# Patient Record
Sex: FEMALE | Race: BLACK OR AFRICAN AMERICAN | NOT HISPANIC OR LATINO | Employment: UNEMPLOYED | URBAN - METROPOLITAN AREA
[De-identification: names, ages, dates, MRNs, and addresses within clinical notes are randomized per-mention and may not be internally consistent; named-entity substitution may affect disease eponyms.]

---

## 2018-03-28 ENCOUNTER — OFFICE VISIT (OUTPATIENT)
Dept: OPTOMETRY | Facility: CLINIC | Age: 33
End: 2018-03-28
Payer: COMMERCIAL

## 2018-03-28 DIAGNOSIS — H52.4 HYPEROPIA WITH PRESBYOPIA OF BOTH EYES: Primary | ICD-10-CM

## 2018-03-28 DIAGNOSIS — H52.03 HYPEROPIA WITH PRESBYOPIA OF BOTH EYES: Primary | ICD-10-CM

## 2018-03-28 PROCEDURE — 92014 COMPRE OPH EXAM EST PT 1/>: CPT | Performed by: OPTOMETRIST

## 2018-03-28 PROCEDURE — 92015 DETERMINE REFRACTIVE STATE: CPT | Performed by: OPTOMETRIST

## 2018-03-28 ASSESSMENT — EXTERNAL EXAM - LEFT EYE: OS_EXAM: NORMAL

## 2018-03-28 ASSESSMENT — SLIT LAMP EXAM - LIDS
COMMENTS: NORMAL
COMMENTS: NORMAL

## 2018-03-28 ASSESSMENT — REFRACTION_MANIFEST
OS_CYLINDER: +0.50
OS_CYLINDER: +0.50
OS_AXIS: 110
METHOD_AUTOREFRACTION: 1
OD_AXIS: 044
OD_SPHERE: +0.25
OD_ADD: +1.00
OS_ADD: +1.00
OS_AXIS: 103
OD_SPHERE: +0.25
OS_SPHERE: +0.50
OD_CYLINDER: +0.25
OS_SPHERE: +0.25

## 2018-03-28 ASSESSMENT — EXTERNAL EXAM - RIGHT EYE: OD_EXAM: NORMAL

## 2018-03-28 ASSESSMENT — REFRACTION_WEARINGRX
OS_SPHERE: +0.75
OD_SPHERE: +0.50
OS_CYLINDER: +0.50
OS_AXIS: 110

## 2018-03-28 ASSESSMENT — CONF VISUAL FIELD
OS_NORMAL: 1
OD_NORMAL: 1

## 2018-03-28 ASSESSMENT — TONOMETRY
IOP_METHOD: APPLANATION
OS_IOP_MMHG: 20
OD_IOP_MMHG: 19

## 2018-03-28 ASSESSMENT — VISUAL ACUITY
OS_SC+: -2
OS_SC: 20/20
OD_SC: 20/30
OD_SC+: -1
OS_SC: 20/30
METHOD: SNELLEN - LINEAR
OD_SC: 20/20

## 2018-03-28 ASSESSMENT — CUP TO DISC RATIO
OS_RATIO: 0.2
OD_RATIO: 0.2

## 2018-03-28 NOTE — PROGRESS NOTES
Chief Complaint   Patient presents with     COMPREHENSIVE EYE EXAM         Last Eye Exam: 11/2016  Dilated Previously: Yes      Pt has a headache every day.  Pt come on as the day goes on - especially if reading.      What are you currently using to see?  Glasses - lost 3 months ago       Distance Vision Acuity: Noticed gradual change in both eyes    Near Vision Acuity: Satisfied with vision while reading  unaided    Eye Comfort: watery eyes - right eye will feel irritation and uncomfortable.    Do you use eye drops? : No  Occupation or Hobbies: works at [employer] and [employer]            Medical, surgical and family histories reviewed and updated 3/28/2018.       OBJECTIVE: See Ophthalmology exam    ASSESSMENT:    ICD-10-CM    1. Hyperopia with presbyopia of both eyes H52.03 EYE EXAM (SIMPLE-NONBILLABLE)    H52.4 REFRACTION      PLAN:     Patient Instructions   A bifocal will help you to see more clearly at both distance and near.    Your eyes may be blurry at near and sensitive to light for several hours from the dilating drops.    Yearly eye exams recommended.    Thank you!

## 2018-03-28 NOTE — MR AVS SNAPSHOT
After Visit Summary   3/28/2018    Armida Quiñonez    MRN: [id number]           Patient Information     Date Of Birth          1985        Visit Information        Provider Department      3/28/2018 2:40 PM Aliya Reed OD [facility]        Today's Diagnoses     Hyperopia with presbyopia of both eyes    -  1      Care Instructions    A bifocal will help you to see more clearly at both distance and near.    Your eyes may be blurry at near and sensitive to light for several hours from the dilating drops.    Yearly eye exams recommended.    Thank you!          Follow-ups after your visit        Follow-up notes from your care team     Return in about 1 year (around 3/28/2019) for comprehensive eye exam.      Your next 10 appointments already scheduled     Apr 04, 2018  8:20 AM CDT   PHYSICAL with Catherine Seo MD   [facility] ([facility])    [address] [place] [id number]   [phone]              Who to contact     If you have questions or need follow up information about today's clinic visit or your schedule please contact [facility] directly at [phone].  Normal or non-critical lab and imaging results will be communicated to you by MyChart, letter or phone within 4 business days after the clinic has received the results. If you do not hear from us within 7 days, please contact the clinic through MyChart or phone. If you have a critical or abnormal lab result, we will notify you by phone as soon as possible.  Submit refill requests through Bazari or call your pharmacy and they will forward the refill request to us. Please allow 3 business days for your refill to be completed.          Additional Information About Your Visit        MyChart Information     Bazari gives you secure access to your electronic health record. If you see a primary care provider, you can also  send messages to your care team and make appointments. If you have questions, please call your primary care clinic.  If you do not have a primary care provider, please call [phone] and they will assist you.        Care EveryWhere ID     This is your Care EveryWhere ID. This could be used by other organizations to access your [facility] medical records  [id number]         Blood Pressure from Last 3 Encounters:   11/07/16 119/62   09/06/16 109/62   07/14/16 129/73    Weight from Last 3 Encounters:   11/07/16 103.2 kg (227 lb 9.6 oz)   09/06/16 102.7 kg (226 lb 8 oz)   07/14/16 101.6 kg (224 lb)              We Performed the Following     EYE EXAM (SIMPLE-NONBILLABLE)     REFRACTION        Primary Care Provider Office Phone # Fax #    Catherine Gómez Lillian Seo [phone]       [address] [place]  [facility] [address]        Equal Access to Services     MINERVA ROBERT : Hadii aad ku hadasho Soomaali, waaxda luqadaha, qaybta kaalmada adeegyada, waxay idiin hayaan adeeg kharash la'ben . So [facility] [phone].    ATENCIÓN: Si habla español, tiene a brown disposición servicios gratuitos de asistencia lingüística. [facility] [phone].    We comply with applicable federal civil rights laws and [place] laws. We do not discriminate on the basis of race, color, national origin, age, disability, sex, sexual orientation, or gender identity.            Thank you!     Thank you for choosing [facility]  for your care. Our goal is always to provide you with excellent care. Hearing back from our patients is one way we can continue to improve our services. Please take a few minutes to complete the written survey that you may receive in the mail after your visit with us. Thank you!             Your Updated Medication List - Protect others around you: Learn how to safely use, store and throw away your medicines at www.disposemymeds.org.          This list is accurate as of 3/28/18 11:59  PM.  Always use your most recent med list.                   Brand Name Dispense Instructions for use Diagnosis    albuterol 108 (90 BASE) MCG/ACT Inhaler    PROAIR HFA/PROVENTIL HFA/VENTOLIN HFA    1 Inhaler    Inhale 2 puffs into the lungs every 6 hours as needed for shortness of breath / dyspnea    Mild persistent asthma, uncomplicated       cyclobenzaprine 10 MG tablet    FLEXERIL    20 tablet    Take 1 tablet (10 mg) by mouth 3 times daily as needed for muscle spasms    Neck pain       flunisolide HFA 80 MCG/ACT Aers oral inhaler    AEROSPAN    27 g    Inhale 1 puff into the lungs 2 times daily    Moderate persistent asthma without complication       ibuprofen 800 MG tablet    ADVIL/MOTRIN    30 tablet    Take 1 tablet (800 mg) by mouth every 8 hours as needed for moderate pain    Acute mechanical low back pain with duration of less than six weeks       montelukast 10 MG tablet    SINGULAIR    90 tablet    Take 1 tablet (10 mg) by mouth At Bedtime    Environmental allergies

## 2018-03-28 NOTE — LETTER
3/28/2018         RE: Armida Quiñonez  [address]   [facility] [address]        Dear Colleague,    Thank you for referring your patient, Armida Quiñonez, to the [facility]. Please see a copy of my visit note below.    Chief Complaint   Patient presents with     COMPREHENSIVE EYE EXAM         Last Eye Exam: 11/2016  Dilated Previously: Yes      Pt has a headache every day.  Pt come on as the day goes on - especially if reading.      What are you currently using to see?  Glasses - lost 3 months ago       Distance Vision Acuity: Noticed gradual change in both eyes    Near Vision Acuity: Satisfied with vision while reading  unaided    Eye Comfort: watery eyes - right eye will feel irritation and uncomfortable.    Do you use eye drops? : No  Occupation or Hobbies: works at [employer]            Medical, surgical and family histories reviewed and updated 3/28/2018.       OBJECTIVE: See Ophthalmology exam    ASSESSMENT:    ICD-10-CM    1. Hyperopia with presbyopia of both eyes H52.03 EYE EXAM (SIMPLE-NONBILLABLE)    H52.4 REFRACTION      PLAN:     Patient Instructions   A bifocal will help you to see more clearly at both distance and near.    Your eyes may be blurry at near and sensitive to light for several hours from the dilating drops.    Yearly eye exams recommended.    Thank you!         Again, thank you for allowing me to participate in the care of your patient.        Sincerely,        Aliya Reed OD

## 2018-03-29 NOTE — PATIENT INSTRUCTIONS
A bifocal will help you to see more clearly at both distance and near.    Your eyes may be blurry at near and sensitive to light for several hours from the dilating drops.    Yearly eye exams recommended.    Thank you!

## 2018-04-04 ENCOUNTER — OFFICE VISIT (OUTPATIENT)
Dept: FAMILY MEDICINE | Facility: CLINIC | Age: 33
End: 2018-04-04
Payer: COMMERCIAL

## 2018-04-04 VITALS
HEIGHT: 65 IN | DIASTOLIC BLOOD PRESSURE: 73 MMHG | WEIGHT: 221 LBS | OXYGEN SATURATION: 97 % | SYSTOLIC BLOOD PRESSURE: 119 MMHG | BODY MASS INDEX: 36.82 KG/M2 | RESPIRATION RATE: 18 BRPM | TEMPERATURE: 98.6 F | HEART RATE: 70 BPM

## 2018-04-04 DIAGNOSIS — F31.81 BIPOLAR 2 DISORDER (H): ICD-10-CM

## 2018-04-04 DIAGNOSIS — R06.81 APNEA: ICD-10-CM

## 2018-04-04 DIAGNOSIS — Z00.00 ENCOUNTER FOR ROUTINE ADULT HEALTH EXAMINATION WITHOUT ABNORMAL FINDINGS: Primary | ICD-10-CM

## 2018-04-04 DIAGNOSIS — E66.01 MORBID OBESITY (H): ICD-10-CM

## 2018-04-04 DIAGNOSIS — Z11.3 SCREEN FOR STD (SEXUALLY TRANSMITTED DISEASE): ICD-10-CM

## 2018-04-04 DIAGNOSIS — J45.30 MILD PERSISTENT ASTHMA, UNCOMPLICATED: ICD-10-CM

## 2018-04-04 LAB
CHOLEST SERPL-MCNC: 208 MG/DL
GLUCOSE SERPL-MCNC: 88 MG/DL (ref 70–99)
HBV SURFACE AG SERPL QL IA: NONREACTIVE
HCV AB SERPL QL IA: NONREACTIVE
HDLC SERPL-MCNC: 66 MG/DL
HIV 1+2 AB+HIV1 P24 AG SERPL QL IA: NONREACTIVE
LDLC SERPL CALC-MCNC: 129 MG/DL
NONHDLC SERPL-MCNC: 142 MG/DL
T PALLIDUM IGG+IGM SER QL: NEGATIVE
TRIGL SERPL-MCNC: 66 MG/DL
TSH SERPL DL<=0.005 MIU/L-ACNC: 0.52 MU/L (ref 0.4–4)

## 2018-04-04 PROCEDURE — 99213 OFFICE O/P EST LOW 20 MIN: CPT | Mod: 25 | Performed by: FAMILY MEDICINE

## 2018-04-04 PROCEDURE — 99395 PREV VISIT EST AGE 18-39: CPT | Performed by: FAMILY MEDICINE

## 2018-04-04 PROCEDURE — 87340 HEPATITIS B SURFACE AG IA: CPT | Performed by: FAMILY MEDICINE

## 2018-04-04 PROCEDURE — 87591 N.GONORRHOEAE DNA AMP PROB: CPT | Performed by: FAMILY MEDICINE

## 2018-04-04 PROCEDURE — 87389 HIV-1 AG W/HIV-1&-2 AB AG IA: CPT | Performed by: FAMILY MEDICINE

## 2018-04-04 PROCEDURE — 86803 HEPATITIS C AB TEST: CPT | Performed by: FAMILY MEDICINE

## 2018-04-04 PROCEDURE — 80061 LIPID PANEL: CPT | Performed by: FAMILY MEDICINE

## 2018-04-04 PROCEDURE — 87491 CHLMYD TRACH DNA AMP PROBE: CPT | Performed by: FAMILY MEDICINE

## 2018-04-04 PROCEDURE — 84443 ASSAY THYROID STIM HORMONE: CPT | Performed by: FAMILY MEDICINE

## 2018-04-04 PROCEDURE — 36415 COLL VENOUS BLD VENIPUNCTURE: CPT | Performed by: FAMILY MEDICINE

## 2018-04-04 PROCEDURE — 82947 ASSAY GLUCOSE BLOOD QUANT: CPT | Performed by: FAMILY MEDICINE

## 2018-04-04 PROCEDURE — 86780 TREPONEMA PALLIDUM: CPT | Performed by: FAMILY MEDICINE

## 2018-04-04 RX ORDER — ALBUTEROL SULFATE 90 UG/1
2 AEROSOL, METERED RESPIRATORY (INHALATION) EVERY 6 HOURS PRN
Qty: 1 INHALER | Refills: 11 | Status: SHIPPED | OUTPATIENT
Start: 2018-04-04 | End: 2023-07-27

## 2018-04-04 RX ORDER — MULTIVIT WITH CALCIUM,IRON,MIN
1 TABLET ORAL DAILY
Qty: 100 TABLET | Refills: 3 | Status: SHIPPED | OUTPATIENT
Start: 2018-04-04 | End: 2019-10-07

## 2018-04-04 ASSESSMENT — ANXIETY QUESTIONNAIRES
6. BECOMING EASILY ANNOYED OR IRRITABLE: NEARLY EVERY DAY
7. FEELING AFRAID AS IF SOMETHING AWFUL MIGHT HAPPEN: NEARLY EVERY DAY
2. NOT BEING ABLE TO STOP OR CONTROL WORRYING: NEARLY EVERY DAY
3. WORRYING TOO MUCH ABOUT DIFFERENT THINGS: NEARLY EVERY DAY
1. FEELING NERVOUS, ANXIOUS, OR ON EDGE: NEARLY EVERY DAY
GAD7 TOTAL SCORE: 20
5. BEING SO RESTLESS THAT IT IS HARD TO SIT STILL: MORE THAN HALF THE DAYS

## 2018-04-04 ASSESSMENT — PATIENT HEALTH QUESTIONNAIRE - PHQ9: 5. POOR APPETITE OR OVEREATING: NEARLY EVERY DAY

## 2018-04-04 ASSESSMENT — PAIN SCALES - GENERAL: PAINLEVEL: NO PAIN (0)

## 2018-04-04 NOTE — PROGRESS NOTES
SUBJECTIVE:   CC: Armida Quiñonez is an 32 year old woman who presents for preventive health visit.     Healthy Habits:    Do you get at least three servings of calcium containing foods daily (dairy, green leafy vegetables, etc.)? yes and no, taking calcium and/or vitamin D supplement: no, not taking    Amount of exercise or daily activities, outside of work: none    Problems taking medications regularly not applicable    Medication side effects: No    Have you had an eye exam in the past two years? yes    Do you see a dentist twice per year? no    Do you have sleep apnea, excessive snoring or daytime drowsiness?yes, snoring and witnessed apneic episodes per patient for years.      Bipolar 2 - stable without medication currently. Has moved back from [place] where she is from originally. Too much family drama.    Obesity - has lost a little weight recently. Not much protein intake and a lot of starches.  Some skin rashes and hair loss noted.    Asthma - doing very well.  Usually has issues in the summer.    Today's PHQ-2 Score:   PHQ-2 ( 1999 Pfizer) 4/4/2018 7/14/2016   Q1: Little interest or pleasure in doing things 0 0   Q2: Feeling down, depressed or hopeless 0 0   PHQ-2 Score 0 0       Abuse: Current or Past(Physical, Sexual or Emotional)- No  Do you feel safe in your environment - Yes    Social History   Substance Use Topics     Smoking status: Former Smoker     Packs/day: 0.50     Years: 3.00     Types: Cigarettes     Quit date: 5/13/2011     Smokeless tobacco: Never Used     Alcohol use 0.0 oz/week     0 Standard drinks or equivalent per week      Comment: occassionally     If you drink alcohol do you typically have >3 drinks per day or >7 drinks per week? No                     Reviewed orders with patient.  Reviewed health maintenance and updated orders accordingly - Yes  Patient Active Problem List   Diagnosis     CARDIOVASCULAR SCREENING; LDL GOAL LESS THAN 160     Morbid obesity (H)     Mild  persistent asthma, uncomplicated     Bipolar 2 disorder (H)     DEMETRI (generalized anxiety disorder)     Past Surgical History:   Procedure Laterality Date     DILATION AND CURETTAGE SUCTION, TREAT INCOMPLETE        HC COLP CERVIX/UPPER VAGINA W BX CERVIX      neg     HC INSERTION INTRAUTERINE DEVICE      Mirena     HC LAPAROSCOPY, SURGICAL; APPENDECTOMY       HC REMOVE INTRAUTERINE DEVICE         Social History   Substance Use Topics     Smoking status: Former Smoker     Packs/day: 0.50     Years: 3.00     Types: Cigarettes     Quit date: 2011     Smokeless tobacco: Never Used     Alcohol use 0.0 oz/week     0 Standard drinks or equivalent per week      Comment: occassionally     Family History   Problem Relation Age of Onset     CANCER Mother      CANCER Maternal Grandmother      Unknown/Adopted No family hx of      DIABETES No family hx of      Hypertension No family hx of      CEREBROVASCULAR DISEASE No family hx of      Thyroid Disease No family hx of      Glaucoma No family hx of      Macular Degeneration No family hx of            Mammogram not appropriate for this patient based on age.    Pertinent mammograms are reviewed under the imaging tab.  History of abnormal Pap smear: NO - age 30- 65 PAP every 3 years recommended    Reviewed and updated as needed this visit by clinical staff  Tobacco  Allergies  Meds  Problems         Reviewed and updated as needed this visit by Provider  Allergies  Meds  Problems            ROS:  C: NEGATIVE for fever, chills, change in weight  I: NEGATIVE for worrisome rashes, moles or lesions  E: NEGATIVE for vision changes or irritation  ENT: NEGATIVE for ear, mouth and throat problems  R: NEGATIVE for significant cough or SOB  B: NEGATIVE for masses, tenderness or discharge  CV: NEGATIVE for chest pain, palpitations or peripheral edema  GI: NEGATIVE for nausea, abdominal pain, heartburn, or change in bowel habits  : NEGATIVE for unusual  "urinary or vaginal symptoms. Periods are regular.  M: NEGATIVE for significant arthralgias or myalgia  N: NEGATIVE for weakness, dizziness or paresthesias  P: NEGATIVE for changes in mood or affect    OBJECTIVE:   /73 (BP Location: Left arm, Patient Position: Chair, Cuff Size: Adult Regular)  Pulse 70  Temp 98.6  F (37  C) (Oral)  Resp 18  Ht 5' 5.35\" (1.66 m)  Wt 221 lb (100.2 kg)  LMP 03/21/2018 (Within Days)  SpO2 97%  Breastfeeding? No  BMI 36.38 kg/m2  EXAM:  GENERAL: healthy, alert, no distress and obese  EYES: Eyes grossly normal to inspection, PERRL and conjunctivae and sclerae normal  HENT: ear canals and TM's normal, nose and mouth without ulcers or lesions  NECK: no adenopathy, no asymmetry, masses, or scars and thyroid normal to palpation  RESP: lungs clear to auscultation - no rales, rhonchi or wheezes  BREAST: normal without masses, tenderness or nipple discharge and no palpable axillary masses or adenopathy  CV: regular rate and rhythm, normal S1 S2, no S3 or S4, no murmur, click or rub, no peripheral edema and peripheral pulses strong  ABDOMEN: soft, nontender, no hepatosplenomegaly, no masses and bowel sounds normal  MS: no gross musculoskeletal defects noted, no edema  SKIN: no suspicious lesions or rashes  NEURO: Normal strength and tone, mentation intact and speech normal  PSYCH: mentation appears normal, affect normal/bright    ASSESSMENT/PLAN:   1. Encounter for routine adult health examination without abnormal findings  Routine preventive discussed  - DEPRESSION ACTION PLAN (DAP)  - Lipid panel reflex to direct LDL Non-fasting  - Glucose  - **TSH with free T4 reflex FUTURE 2mo  - Multiple Vitamins-Minerals (MULTIPLE VITAMIN  S/WOMENS) TABS; Take 1 tablet by mouth daily  Dispense: 100 tablet; Refill: 3    2. Screen for STD (sexually transmitted disease)  screening  - Chlamydia trachomatis PCR  - Neisseria gonorrhoeae PCR  - Hepatitis C antibody  - Hepatitis B surface antigen  - " "HIV Antigen Antibody Combo  - Anti Treponema    3. Apnea  Referral.  - SLEEP EVALUATION & MANAGEMENT REFERRAL - ADULT -[facility] [place]  [phone] (Age 15 and up); Future    4. Mild persistent asthma, uncomplicated  Stable - refill albuterol for prn use  - albuterol (PROAIR HFA/PROVENTIL HFA/VENTOLIN HFA) 108 (90 BASE) MCG/ACT Inhaler; Inhale 2 puffs into the lungs every 6 hours as needed for shortness of breath / dyspnea  Dispense: 1 Inhaler; Refill: 11    5. Morbid obesity (H)  Low carb eating, increase protein and exercise    6. Bipolar 2 disorder (H)  Monitor for now as patient is not interested in medication.    The uses and side effects, including black box warnings as appropriate, were discussed in detail.  All patient questions were answered.  The patient was instructed to call immediately if any side effects developed.       COUNSELING:   Reviewed preventive health counseling, as reflected in patient instructions       reports that she quit smoking about 6 years ago. Her smoking use included Cigarettes. She has a 1.50 pack-year smoking history. She has never used smokeless tobacco.    Estimated body mass index is 36.38 kg/(m^2) as calculated from the following:    Height as of this encounter: 5' 5.35\" (1.66 m).    Weight as of this encounter: 221 lb (100.2 kg).   Weight management plan: Discussed healthy diet and exercise guidelines and patient will follow up in 12 months in clinic to re-evaluate.    Counseling Resources:  ATP IV Guidelines  Pooled Cohorts Equation Calculator  Breast Cancer Risk Calculator  FRAX Risk Assessment  ICSI Preventive Guidelines  Dietary Guidelines for Americans, 2010  USDA's MyPlate  ASA Prophylaxis  Lung CA Screening    Catherine Seo MD  [facility]  "

## 2018-04-04 NOTE — MR AVS SNAPSHOT
After Visit Summary   4/4/2018    Armida Quiñonez    MRN: [id number]           Patient Information     Date Of Birth          1985        Visit Information        Provider Department      4/4/2018 8:20 AM Catherine Gil MD [facility]        Today's Diagnoses     Encounter for routine adult health examination without abnormal findings    -  1    Screen for STD (sexually transmitted disease)        Apnea          Care Instructions      Preventive Health Recommendations  Female Ages 26 - 39  Yearly exam:   See your health care provider every year in order to    Review health changes.     Discuss preventive care.      Review your medicines if you your doctor has prescribed any.    Until age 30: Get a Pap test every three years (more often if you have had an abnormal result).    After age 30: Talk to your doctor about whether you should have a Pap test every 3 years or have a Pap test with HPV screening every 5 years.   You do not need a Pap test if your uterus was removed (hysterectomy) and you have not had cancer.  You should be tested each year for STDs (sexually transmitted diseases), if you're at risk.   Talk to your provider about how often to have your cholesterol checked.  If you are at risk for diabetes, you should have a diabetes test (fasting glucose).  Shots: Get a flu shot each year. Get a tetanus shot every 10 years.   Nutrition:     Eat at least 5 servings of fruits and vegetables each day.    Eat whole-grain bread, whole-wheat pasta and brown rice instead of white grains and rice.    Talk to your provider about Calcium and Vitamin D.     Lifestyle    Exercise at least 150 minutes a week (30 minutes a day, 5 days of the week). This will help you control your weight and prevent disease.    Limit alcohol to one drink per day.    No smoking.     Wear sunscreen to prevent skin cancer.    See your dentist every six months for an exam and cleaning.             Follow-ups after your visit        Additional Services     SLEEP EVALUATION & MANAGEMENT REFERRAL - [facility]  [phone] (Age 15 and up)       Please be aware that coverage of these services is subject to the terms and limitations of your health insurance plan.  Call member services at your health plan with any benefit or coverage questions.      Please bring the following to your appointment:    >>   List of current medications   >>   This referral request   >>   Any documents/labs given to you for this referral                      Future tests that were ordered for you today     Open Future Orders        Priority Expected Expires Ordered    SLEEP EVALUATION & MANAGEMENT REFERRAL - [facility]  [phone] (Age 15 and up) Routine  4/4/2019 4/4/2018            Who to contact     If you have questions or need follow up information about today's clinic visit or your schedule please contact [facility] directly at [phone].  Normal or non-critical lab and imaging results will be communicated to you by MyChart, letter or phone within 4 business days after the clinic has received the results. If you do not hear from us within 7 days, please contact the clinic through [email or web address] or phone. If you have a critical or abnormal lab result, we will notify you by phone as soon as possible.  Submit refill requests through Demohour or call your pharmacy and they will forward the refill request to us. Please allow 3 business days for your refill to be completed.          Additional Information About Your Visit        [email or web address] Information     Demohour gives you secure access to your electronic health record. If you see a primary care provider, you can also send messages to your care team and make appointments. If you have questions, please call your primary care clinic.  If you do not have a primary care provider, please call [phone]  "and they will assist you.        Care EveryWhere ID     This is your Care EveryWhere ID. This could be used by other organizations to access your [facility] medical records  [id number]        Your Vitals Were     Pulse Temperature Respirations Height Last Period Pulse Oximetry    70 98.6  F (37  C) (Oral) 18 5' 5.35\" (1.66 m) 03/21/2018 (Within Days) 97%    Breastfeeding? BMI (Body Mass Index)                No 36.38 kg/m2           Blood Pressure from Last 3 Encounters:   04/04/18 119/73   11/07/16 119/62   09/06/16 109/62    Weight from Last 3 Encounters:   04/04/18 221 lb (100.2 kg)   11/07/16 227 lb 9.6 oz (103.2 kg)   09/06/16 226 lb 8 oz (102.7 kg)              We Performed the Following     **TSH with free T4 reflex FUTURE 2mo     Anti Treponema     Chlamydia trachomatis PCR     DEPRESSION ACTION PLAN (DAP)     Glucose     Hepatitis B surface antigen     Hepatitis C antibody     HIV Antigen Antibody Combo     Lipid panel reflex to direct LDL Non-fasting     Neisseria gonorrhoeae PCR          Today's Medication Changes          These changes are accurate as of 4/4/18  9:16 AM.  If you have any questions, ask your nurse or doctor.               Start taking these medicines.        Dose/Directions    MULTIPLE vitamin  S/WOMENS Tabs   Used for:  Encounter for routine adult health examination without abnormal findings   Started by:  Catherine Gil MD        Dose:  1 tablet   Take 1 tablet by mouth daily   Quantity:  100 tablet   Refills:  3            Where to get your medicines      These medications were sent to [facility] [id number] [place] [address] [address] [facility] [address] [facility] [place]  [address] [facility] [address]    Hours:  24-hours Phone:  [phone]     MULTIPLE vitamin  S/WOMENS Tabs                Primary Care Provider Office Phone # Fax #    Catherine Seo [phone]       [address]  [facility] " [address]        Equal Access to Services     [facility] HospitalJONATHAN : Hadii aad ku hadnildaron Littledamon, wasandrada luchinyereadaha, qaybta kaalmatiago jones. So [facility] [phone].    ATENCIÓN: Si habla español, tiene a brown disposición servicios gratuitos de asistencia lingüística. Llame al [phone].    We comply with applicable federal civil rights laws and [place] laws. We do not discriminate on the basis of race, color, national origin, age, disability, sex, sexual orientation, or gender identity.            Thank you!     Thank you for choosing [facility]  for your care. Our goal is always to provide you with excellent care. Hearing back from our patients is one way we can continue to improve our services. Please take a few minutes to complete the written survey that you may receive in the mail after your visit with us. Thank you!             Your Updated Medication List - Protect others around you: Learn how to safely use, store and throw away your medicines at www.disposemymeds.org.          This list is accurate as of 4/4/18  9:16 AM.  Always use your most recent med list.                   Brand Name Dispense Instructions for use Diagnosis    MULTIPLE vitamin  S/WOMENS Tabs     100 tablet    Take 1 tablet by mouth daily    Encounter for routine adult health examination without abnormal findings

## 2018-04-04 NOTE — LETTER
April 6, 2018      Armidadov Quiñonez  [address]   [facility] [address]            Ms. Quiñonez,    All of your labs were normal for you.  Neither hepatitis B, hepatitis C, HIV, syphilis, gonorrhea nor chlamydia were found.     Please contact the clinic if you have additional questions.  Thank you.    Sincerely,    Catherine Snyder Seo/ag                                                Results for orders placed or performed in visit on 04/04/18   Hepatitis C antibody   Result Value Ref Range    Hepatitis C Antibody Nonreactive NR^Nonreactive   Hepatitis B surface antigen   Result Value Ref Range    Hep B Surface Agn Nonreactive NR^Nonreactive   HIV Antigen Antibody Combo   Result Value Ref Range    HIV Antigen Antibody Combo Nonreactive NR^Nonreactive       Anti Treponema   Result Value Ref Range    Treponema pallidum Antibody Negative NEG^Negative   Lipid panel reflex to direct LDL Non-fasting   Result Value Ref Range    Cholesterol 208 (H) <200 mg/dL    Triglycerides 66 <150 mg/dL    HDL Cholesterol 66 >49 mg/dL    LDL Cholesterol Calculated 129 (H) <100 mg/dL    Non HDL Cholesterol 142 (H) <130 mg/dL   Glucose   Result Value Ref Range    Glucose 88 70 - 99 mg/dL   **TSH with free T4 reflex FUTURE 2mo   Result Value Ref Range    TSH 0.52 0.40 - 4.00 mU/L   Chlamydia trachomatis PCR   Result Value Ref Range    Specimen Description Urine     Chlamydia Trachomatis PCR Negative NEG^Negative   Neisseria gonorrhoeae PCR   Result Value Ref Range    Specimen Descrip Urine     N Gonorrhea PCR Negative NEG^Negative

## 2018-04-05 LAB
C TRACH DNA SPEC QL NAA+PROBE: NEGATIVE
N GONORRHOEA DNA SPEC QL NAA+PROBE: NEGATIVE
SPECIMEN SOURCE: NORMAL
SPECIMEN SOURCE: NORMAL

## 2018-04-05 ASSESSMENT — ASTHMA QUESTIONNAIRES: ACT_TOTALSCORE: 20

## 2018-04-05 ASSESSMENT — PATIENT HEALTH QUESTIONNAIRE - PHQ9: SUM OF ALL RESPONSES TO PHQ QUESTIONS 1-9: 10

## 2018-04-05 ASSESSMENT — ANXIETY QUESTIONNAIRES: GAD7 TOTAL SCORE: 20

## 2018-04-06 NOTE — PROGRESS NOTES
Ms. Quiñonez,    All of your labs were normal for you.  Neither hepatitis B, hepatitis C, HIV, syphilis, gonorrhea nor chlamydia were found.     Please contact the clinic if you have additional questions.  Thank you.    Sincerely,    Catherine Seo

## 2018-06-12 ENCOUNTER — OFFICE VISIT (OUTPATIENT)
Dept: FAMILY MEDICINE | Facility: CLINIC | Age: 33
End: 2018-06-12
Payer: COMMERCIAL

## 2018-06-12 ENCOUNTER — RADIANT APPOINTMENT (OUTPATIENT)
Dept: GENERAL RADIOLOGY | Facility: CLINIC | Age: 33
End: 2018-06-12
Attending: NURSE PRACTITIONER
Payer: COMMERCIAL

## 2018-06-12 VITALS
TEMPERATURE: 98.3 F | OXYGEN SATURATION: 98 % | HEIGHT: 63 IN | SYSTOLIC BLOOD PRESSURE: 124 MMHG | DIASTOLIC BLOOD PRESSURE: 79 MMHG | WEIGHT: 227 LBS | HEART RATE: 83 BPM | BODY MASS INDEX: 40.22 KG/M2 | RESPIRATION RATE: 16 BRPM

## 2018-06-12 DIAGNOSIS — M25.551 HIP PAIN, RIGHT: Primary | ICD-10-CM

## 2018-06-12 DIAGNOSIS — N76.0 BACTERIAL VAGINOSIS: ICD-10-CM

## 2018-06-12 DIAGNOSIS — M25.551 HIP PAIN, RIGHT: ICD-10-CM

## 2018-06-12 DIAGNOSIS — B96.89 BACTERIAL VAGINOSIS: ICD-10-CM

## 2018-06-12 LAB
BETA HCG QUAL IFA URINE: NEGATIVE
SPECIMEN SOURCE: ABNORMAL
WET PREP SPEC: ABNORMAL

## 2018-06-12 PROCEDURE — 87491 CHLMYD TRACH DNA AMP PROBE: CPT | Performed by: NURSE PRACTITIONER

## 2018-06-12 PROCEDURE — 87210 SMEAR WET MOUNT SALINE/INK: CPT | Performed by: NURSE PRACTITIONER

## 2018-06-12 PROCEDURE — 73502 X-RAY EXAM HIP UNI 2-3 VIEWS: CPT | Mod: RT

## 2018-06-12 PROCEDURE — 87591 N.GONORRHOEAE DNA AMP PROB: CPT | Performed by: NURSE PRACTITIONER

## 2018-06-12 PROCEDURE — 84703 CHORIONIC GONADOTROPIN ASSAY: CPT | Performed by: NURSE PRACTITIONER

## 2018-06-12 PROCEDURE — 99214 OFFICE O/P EST MOD 30 MIN: CPT | Performed by: NURSE PRACTITIONER

## 2018-06-12 RX ORDER — IBUPROFEN 600 MG/1
600 TABLET, FILM COATED ORAL EVERY 8 HOURS PRN
Qty: 30 TABLET | Refills: 0 | Status: SHIPPED | OUTPATIENT
Start: 2018-06-12 | End: 2019-10-07

## 2018-06-12 RX ORDER — METRONIDAZOLE 7.5 MG/G
1 GEL VAGINAL AT BEDTIME
Qty: 70 G | Refills: 0 | Status: SHIPPED | OUTPATIENT
Start: 2018-06-12 | End: 2018-10-22

## 2018-06-12 ASSESSMENT — ANXIETY QUESTIONNAIRES
3. WORRYING TOO MUCH ABOUT DIFFERENT THINGS: NEARLY EVERY DAY
6. BECOMING EASILY ANNOYED OR IRRITABLE: SEVERAL DAYS
2. NOT BEING ABLE TO STOP OR CONTROL WORRYING: NEARLY EVERY DAY
5. BEING SO RESTLESS THAT IT IS HARD TO SIT STILL: SEVERAL DAYS
GAD7 TOTAL SCORE: 15
7. FEELING AFRAID AS IF SOMETHING AWFUL MIGHT HAPPEN: SEVERAL DAYS
1. FEELING NERVOUS, ANXIOUS, OR ON EDGE: NEARLY EVERY DAY

## 2018-06-12 ASSESSMENT — PATIENT HEALTH QUESTIONNAIRE - PHQ9: 5. POOR APPETITE OR OVEREATING: NEARLY EVERY DAY

## 2018-06-12 ASSESSMENT — PAIN SCALES - GENERAL: PAINLEVEL: WORST PAIN (10)

## 2018-06-12 NOTE — MR AVS SNAPSHOT
After Visit Summary   6/12/2018    Armida Quiñonez    MRN: [id number]           Patient Information     Date Of Birth          1985        Visit Information        Provider Department      6/12/2018 2:40 PM Mayuri Ojeda APRN CNP [facility]        Today's Diagnoses     Hip pain, right    -  1    Bacterial vaginosis          Care Instructions    We will call you with the results of your x-ray  Ice your hip 15 minutes 4-6 times daily  Start ibuprofen 60 mg every 8 hours. Take with food  Start physical therapy.  If not improving, we will consider orthopedics referral    Start metronidazole topical cream today  Avoid douching and irritants such as bubble baths or strong soaps.  No intercourse until you have completed treatment.   If not improving in 5 days, follow up with primary care provider.          At [facility], we strive to deliver an exceptional experience to you, every time we see you.  If you receive a survey in the mail, please send us back your thoughts. We really do value your feedback.    Based on your medical history, these are the current health maintenance/preventive care services that you are due for (some may have been done at this visit.)  Health Maintenance Due   Topic Date Due     ASTHMA ACTION PLAN Q1 YR  05/04/2017         Suggested websites for health information:  Www.[employer] : Up to date and easily searchable information on multiple topics.  Www.medlineplus.gov : medication info, interactive tutorials, watch real surgeries online  Www.familydoctor.org : good info from the Academy of Family Physicians  Www.cdc.gov : public health info, travel advisories, epidemics (H1N1)  Www.aap.org : children's health info, normal development, vaccinations  Www.health.[facility].us : [place] dept of health, public health issues in [place], N1N1    Your care team:                            Family Medicine Internal Medicine   MD Francisco Javier Abreu  MD Catherine Lynch MD Katya Georgiev PA-C Nam Ho, MD Pediatrics   JACKLYN Peterson, JANE Sheehan APRN CNP   MD Toshia Turcios MD Deborah Mielke, MD Kim Thein, [facility]      Clinic hours: Monday - Thursday 7 am-7 pm;  7 am-5 pm.   Urgent care: Monday - Friday 11 am-9 pm; Saturday and  9 am-5 pm.  Pharmacy : Monday -Thursday 8 am-8 pm; Friday 8 am-6 pm; Saturday and  9 am-5 pm.     Clinic: [phone]   Pharmacy: [phone]    Bacterial Vaginosis    You have a vaginal infection called bacterial vaginosis (BV). Both good and bad bacteria are present in a healthy vagina. BV occurs when these bacteria get out of balance. The number of bad bacteria increase. And the number of good bacteria decrease. Although BV is associated with sexual activity, it is not a sexually transmitted disease.  BV may or may not cause symptoms. If symptoms do occur, they can include:    Thin, gray, milky-white, or sometimes green discharge    Unpleasant odor or  fishy  smell    Itching, burning, or pain in or around the vagina  It is not known what causes BV, but certain factors can make the problem more likely. This can include:    Douching    Having sex with a new partner    Having sex with more than one partner  BV will sometimes go away on its own. But treatment is usually recommended. This is because untreated BV can increase the risk of more serious health problems such as:    Pelvic inflammatory disease (PID)     delivery (giving birth to a baby early if you re pregnant)    HIV and certain other sexually transmitted diseases (STDs)    Infection after surgery on the reproductive organs  Home care  General care    BV is most often treated with medicines called antibiotics. These may be given as pills or as a vaginal cream. If antibiotics are prescribed, be sure to use them exactly as directed. Also, be sure to complete all of the  medicine, even if your symptoms go away.    Don't douche or having sex during treatment.    If you have sex with a female partner, ask your healthcare provider if she should also be treated.  Prevention    Don't douche.    Don't have sex. If you do have sex, then take steps to lower your risk:  ? Use condoms when having sex.  ? Limit the number of sexual partners you have.  Follow-up care  Follow up with your healthcare provider, or as advised.  When to seek medical advice  Call your healthcare provider right away if:    You have a fever of 100.4 F (38 C) or higher, or as directed by your provider.    Your symptoms worsen, or they don t go away within a few days of starting treatment.    You have new pain in the lower belly or pelvic region.    You have side effects that bother you or a reaction to the pills or cream you re prescribed.    You or any partners you have sex with have new symptoms, such as a rash, joint pain, or sores.  Date Last Reviewed: 10/1/2017    6706-8814 [employer]. [address] [place] [address]. All rights reserved. This information is not intended as a substitute for professional medical care. Always follow your healthcare professional's instructions.                Follow-ups after your visit        Additional Services     PHYSICAL THERAPY REFERRAL       *This therapy referral will be filtered to a centralized scheduling office at [facility] and the patient will receive a call to schedule an appointment at a [facility] location most convenient for them. *     [facility] provides Physical Therapy evaluation and treatment and many specialty services across the [facility] system.  If requesting a specialty program, please choose from the list below.    If you have not heard from the scheduling office within 2 business days, please call [phone] for all locations, with the exception of [place], please call [phone] and [place], please call [phone]  Treatment: Evaluation & Treatment  Special Instructions/Modalities: eval and treat  Special Programs: None    Please be aware that coverage of these services is subject to the terms and limitations of your health insurance plan.  Call member services at your health plan with any benefit or coverage questions.      **Note to Provider:  If you are referring outside of [facility] for the therapy appointment, please list the name of the location in the \"special instructions\" above, print the referral and give to the patient to schedule the appointment.                  Future tests that were ordered for you today     Open Future Orders        Priority Expected Expires Ordered    XR Pelvis w Hip Right G/E 2 Views Routine 6/12/2018 6/12/2019 6/12/2018            Who to contact     If you have questions or need follow up information about today's clinic visit or your schedule please contact [facility] directly at [phone].  Normal or non-critical lab and imaging results will be communicated to you by [employer], letter or phone within 4 business days after the clinic has received the results. If you do not hear from us within 7 days, please contact the clinic through [employer] or phone. If you have a critical or abnormal lab result, we will notify you by phone as soon as possible.  Submit refill requests through [email or web address] or call your pharmacy and they will forward the refill request to us. Please allow 3 business days for your refill to be completed.          Additional Information About Your Visit        [employer] Information     [email or web address] gives you secure access to your electronic health record. If you see a primary care provider, you can also send messages to your care team and make appointments. If you have questions, please call your primary care clinic.  If you do not have a primary care provider, please call [phone] and they will assist you.        Care EveryWhere " "ID     This is your Care EveryWhere ID. This could be used by other organizations to access your [facility] medical records  [id number]        Your Vitals Were     Pulse Temperature Respirations Height Last Period Pulse Oximetry    83 98.3  F (36.8  C) (Oral) 16 5' 3\" (1.6 m) (LMP Unknown) 98%    Breastfeeding? BMI (Body Mass Index)                No 40.21 kg/m2           Blood Pressure from Last 3 Encounters:   06/12/18 124/79   04/04/18 119/73   11/07/16 119/62    Weight from Last 3 Encounters:   06/12/18 227 lb (103 kg)   04/04/18 221 lb (100.2 kg)   11/07/16 227 lb 9.6 oz (103.2 kg)              We Performed the Following     Beta HCG Qual, Urine - [facility] and [place] ([id number])     Chlamydia trachomatis PCR     Neisseria gonorrhoeae PCR     PHYSICAL THERAPY REFERRAL     Wet prep          Today's Medication Changes          These changes are accurate as of 6/12/18  3:41 PM.  If you have any questions, ask your nurse or doctor.               Start taking these medicines.        Dose/Directions    ibuprofen 600 MG tablet   Commonly known as:  ADVIL/MOTRIN   Used for:  Hip pain, right   Started by:  Mayuri Ojeda APRN CNP        Dose:  600 mg   Take 1 tablet (600 mg) by mouth every 8 hours as needed for moderate pain   Quantity:  30 tablet   Refills:  0       metroNIDAZOLE 0.75 % vaginal gel   Commonly known as:  METROGEL   Used for:  Bacterial vaginosis   Started by:  Mayuri Ojeda APRN CNP        Dose:  1 applicator   Place 1 applicator (5 g) vaginally At Bedtime for 5 days   Quantity:  70 g   Refills:  0            Where to get your medicines      These medications were sent to [facility] - [place], [place] - [address] [facility]  [address] [facility] [address]    Hours:  24-hours Phone:  [phone]     ibuprofen 600 MG tablet    metroNIDAZOLE 0.75 % vaginal gel                Primary Care Provider Office Phone # Fax #    Catherine Gómez " Lillian Seo -[phone]       [address]  [facility] [address]        Equal Access to Services     MINERVA ROBERT : Hadii aad ku hadnildao Somayteali, waaxda luqadaha, qaybta kaalmada adekarla, tiago farzanain hayaajudy pagannapoleon pepper jaime tovar. So [facility] [phone].    ATENCIÓN: Si habla español, tiene a brown disposición servicios gratuitos de asistencia lingüística. Llame al [phone].    We comply with applicable federal civil rights laws and [place] laws. We do not discriminate on the basis of race, color, national origin, age, disability, sex, sexual orientation, or gender identity.            Thank you!     Thank you for choosing [facility]  for your care. Our goal is always to provide you with excellent care. Hearing back from our patients is one way we can continue to improve our services. Please take a few minutes to complete the written survey that you may receive in the mail after your visit with us. Thank you!             Your Updated Medication List - Protect others around you: Learn how to safely use, store and throw away your medicines at www.disposemymeds.org.          This list is accurate as of 6/12/18  3:41 PM.  Always use your most recent med list.                   Brand Name Dispense Instructions for use Diagnosis    albuterol 108 (90 Base) MCG/ACT Inhaler    PROAIR HFA/PROVENTIL HFA/VENTOLIN HFA    1 Inhaler    Inhale 2 puffs into the lungs every 6 hours as needed for shortness of breath / dyspnea    Mild persistent asthma, uncomplicated       ibuprofen 600 MG tablet    ADVIL/MOTRIN    30 tablet    Take 1 tablet (600 mg) by mouth every 8 hours as needed for moderate pain    Hip pain, right       metroNIDAZOLE 0.75 % vaginal gel    METROGEL    70 g    Place 1 applicator (5 g) vaginally At Bedtime for 5 days    Bacterial vaginosis       MULTIPLE vitamin  S/WOMENS Tabs     100 tablet    Take 1 tablet by mouth daily    Encounter for routine adult health  examination without abnormal findings

## 2018-06-12 NOTE — PATIENT INSTRUCTIONS
We will call you with the results of your x-ray  Ice your hip 15 minutes 4-6 times daily  Start ibuprofen 60 mg every 8 hours. Take with food  Start physical therapy.  If not improving, we will consider orthopedics referral    Start metronidazole topical cream today  Avoid douching and irritants such as bubble baths or strong soaps.  No intercourse until you have completed treatment.   If not improving in 5 days, follow up with primary care provider.          At [facility], we strive to deliver an exceptional experience to you, every time we see you.  If you receive a survey in the mail, please send us back your thoughts. We really do value your feedback.    Based on your medical history, these are the current health maintenance/preventive care services that you are due for (some may have been done at this visit.)  Health Maintenance Due   Topic Date Due     ASTHMA ACTION PLAN Q1 YR  05/04/2017         Suggested websites for health information:  Www.[employer]akokat : Up to date and easily searchable information on multiple topics.  Www.medlineplus.gov : medication info, interactive tutorials, watch real surgeries online  Www.familydoctor.org : good info from the Academy of Family Physicians  Www.cdc.gov : public health info, travel advisories, epidemics (H1N1)  Www.aap.org : children's health info, normal development, vaccinations  Www.health.state.[place].us : [place] dept of health, public health issues in [place], N1N1    Your care team:                            Family Medicine Internal Medicine   MD Francisco Javier Abreu MD Shantel Branch-Fleming, MD Katya Georgiev PA-C Nam Ho, MD Pediatrics   JACKLYN Peterson, MD Toshia Carrion CNP, MD Deborah Mielke, MD Kim Thein, APRN CNP      Clinic hours: Monday - Thursday 7 am-7 pm; Fridays 7 am-5 pm.   Urgent care: Monday - Friday 11 am-9 pm; Saturday and Sunday 9 am-5 pm.  Pharmacy :  Monday -Thursday 8 am-8 pm; Friday 8 am-6 pm; Saturday and  9 am-5 pm.     Clinic: [phone]   Pharmacy: [phone]    Bacterial Vaginosis    You have a vaginal infection called bacterial vaginosis (BV). Both good and bad bacteria are present in a healthy vagina. BV occurs when these bacteria get out of balance. The number of bad bacteria increase. And the number of good bacteria decrease. Although BV is associated with sexual activity, it is not a sexually transmitted disease.  BV may or may not cause symptoms. If symptoms do occur, they can include:    Thin, gray, milky-white, or sometimes green discharge    Unpleasant odor or  fishy  smell    Itching, burning, or pain in or around the vagina  It is not known what causes BV, but certain factors can make the problem more likely. This can include:    Douching    Having sex with a new partner    Having sex with more than one partner  BV will sometimes go away on its own. But treatment is usually recommended. This is because untreated BV can increase the risk of more serious health problems such as:    Pelvic inflammatory disease (PID)     delivery (giving birth to a baby early if you re pregnant)    HIV and certain other sexually transmitted diseases (STDs)    Infection after surgery on the reproductive organs  Home care  General care    BV is most often treated with medicines called antibiotics. These may be given as pills or as a vaginal cream. If antibiotics are prescribed, be sure to use them exactly as directed. Also, be sure to complete all of the medicine, even if your symptoms go away.    Don't douche or having sex during treatment.    If you have sex with a female partner, ask your healthcare provider if she should also be treated.  Prevention    Don't douche.    Don't have sex. If you do have sex, then take steps to lower your risk:  ? Use condoms when having sex.  ? Limit the number of sexual partners you have.  Follow-up care  Follow  up with your healthcare provider, or as advised.  When to seek medical advice  Call your healthcare provider right away if:    You have a fever of 100.4 F (38 C) or higher, or as directed by your provider.    Your symptoms worsen, or they don t go away within a few days of starting treatment.    You have new pain in the lower belly or pelvic region.    You have side effects that bother you or a reaction to the pills or cream you re prescribed.    You or any partners you have sex with have new symptoms, such as a rash, joint pain, or sores.  Date Last Reviewed: 10/1/2017    4612-1448 [employer]. [address] [place] [address]. All rights reserved. This information is not intended as a substitute for professional medical care. Always follow your healthcare professional's instructions.

## 2018-06-12 NOTE — PROGRESS NOTES
SUBJECTIVE:   Armida Quiñonez is a 32 year old female who presents to clinic today for the following health issues:      Joint Pain    Onset: ongoing 2 weeks    Description:   Location: right hip  Character: Sharp and Dull ache    Intensity: severe    Progression of Symptoms: worse    Accompanying Signs & Symptoms:  Other symptoms: swelling    History:   Previous similar pain: no       Precipitating factors:   Trauma or overuse: no     Alleviating factors:  Improved by: nothing    Therapies Tried and outcome: None      No known injury. Has a history of right knee pain and ankle swelling after breaking ankle when she was younger. Occasionally drinks wine.     Vaginal Symptoms  Onset: 2 weeks    Description:  Vaginal Discharge: white creamy   Itching (Pruritis): no   Burning sensation:  no   Odor: no     Accompanying Signs & Symptoms:  Pain with Urination: no   Abdominal Pain: YES  Fever: no     History:   Sexually active: YES  New Partner: YES  Possibility of Pregnancy:  Don't Know    Precipitating factors:   Recent Antibiotic Use: no     Alleviating factors:  None    Therapies Tried and outcome: None  Showering twice daily    No fever. Nausea, no vomiting. No rash.     Problem list and histories reviewed & adjusted, as indicated.  Additional history: as documented    Patient Active Problem List   Diagnosis     CARDIOVASCULAR SCREENING; LDL GOAL LESS THAN 160     Morbid obesity (H)     Mild persistent asthma, uncomplicated     Bipolar 2 disorder (H)     DEMETRI (generalized anxiety disorder)     Past Surgical History:   Procedure Laterality Date     DILATION AND CURETTAGE SUCTION, TREAT INCOMPLETE        HC COLP CERVIX/UPPER VAGINA W BX CERVIX      neg     HC INSERTION INTRAUTERINE DEVICE      Mirena      LAPAROSCOPY, SURGICAL; APPENDECTOMY       HC REMOVE INTRAUTERINE DEVICE         Social History   Substance Use Topics     Smoking status: Former Smoker     Packs/day: 0.50     Years: 3.00  "    Types: Cigarettes     Quit date: 5/13/2011     Smokeless tobacco: Never Used     Alcohol use 0.0 oz/week     0 Standard drinks or equivalent per week      Comment: occassionally     Family History   Problem Relation Age of Onset     CANCER Mother      CANCER Maternal Grandmother      Unknown/Adopted No family hx of      DIABETES No family hx of      Hypertension No family hx of      CEREBROVASCULAR DISEASE No family hx of      Thyroid Disease No family hx of      Glaucoma No family hx of      Macular Degeneration No family hx of          Current Outpatient Prescriptions   Medication Sig Dispense Refill     albuterol (PROAIR HFA/PROVENTIL HFA/VENTOLIN HFA) 108 (90 BASE) MCG/ACT Inhaler Inhale 2 puffs into the lungs every 6 hours as needed for shortness of breath / dyspnea 1 Inhaler 11     ibuprofen (ADVIL/MOTRIN) 600 MG tablet Take 1 tablet (600 mg) by mouth every 8 hours as needed for moderate pain 30 tablet 0     metroNIDAZOLE (METROGEL) 0.75 % vaginal gel Place 1 applicator (5 g) vaginally At Bedtime for 5 days 70 g 0     Multiple Vitamins-Minerals (MULTIPLE VITAMIN  S/WOMENS) TABS Take 1 tablet by mouth daily (Patient not taking: Reported on 6/12/2018) 100 tablet 3     Allergies   Allergen Reactions     Latex        Reviewed and updated as needed this visit by clinical staff  Tobacco  Allergies  Meds  Problems  Med Hx  Surg Hx  Fam Hx  Soc Hx        Reviewed and updated as needed this visit by Provider  Allergies  Meds  Problems         ROS:  Constitutional, HEENT, cardiovascular, pulmonary, GI, , musculoskeletal, neuro, skin, endocrine and psych systems are negative, except as otherwise noted.    OBJECTIVE:     /79 (BP Location: Right arm, Patient Position: Chair, Cuff Size: Adult Large)  Pulse 83  Temp 98.3  F (36.8  C) (Oral)  Resp 16  Ht 5' 3\" (1.6 m)  Wt 227 lb (103 kg)  LMP  (LMP Unknown)  SpO2 98%  Breastfeeding? No  BMI 40.21 kg/m2  Body mass index is 40.21 " kg/(m^2).  GENERAL: healthy, alert and no distress  EYES: Eyes grossly normal to inspection, PERRL and conjunctivae and sclerae normal  HENT: ear canals and TM's normal, nose and mouth without ulcers or lesions  NECK: no adenopathy, no asymmetry, masses, or scars and thyroid normal to palpation  RESP: lungs clear to auscultation - no rales, rhonchi or wheezes  CV: regular rate and rhythm, normal S1 S2, no S3 or S4, no murmur, click or rub, no peripheral edema and peripheral pulses strong  ABDOMEN: soft, nontender, no hepatosplenomegaly, no masses and bowel sounds normal  MS: no gross musculoskeletal defects noted, no edema  SKIN: no suspicious lesions or rashes  PSYCH: mentation appears normal, affect normal/bright    Diagnostic Test Results:  Results for orders placed or performed in visit on 06/12/18 (from the past 24 hour(s))   Wet prep   Result Value Ref Range    Specimen Description Vagina     Wet Prep No Trichomonas seen     Wet Prep Clue cells seen (A)     Wet Prep No yeast seen    Beta HCG Qual, Urine - [facility] [place] ([id number])   Result Value Ref Range    Beta HCG Qual IFA Urine Negative NEG^Negative          ASSESSMENT/PLAN:     1. Hip pain, right  - XR Pelvis w Hip Right G/E 2 Views; Future  - PHYSICAL THERAPY REFERRAL  - ibuprofen (ADVIL/MOTRIN) 600 MG tablet; Take 1 tablet (600 mg) by mouth every 8 hours as needed for moderate pain  Dispense: 30 tablet; Refill: 0  We will call you with the results of your x-ray  Ice your hip 15 minutes 4-6 times daily  Start ibuprofen 60 mg every 8 hours. Take with food  Start physical therapy.  If not improving, we will consider orthopedics referral    2. Bacterial vaginosis  Clue cells on wet prep  - Chlamydia trachomatis PCR  - Neisseria gonorrhoeae PCR  - Wet prep  - Beta HCG Qual, Urine - [facility] [place] ([id number])  - metroNIDAZOLE (METROGEL) 0.75 % vaginal gel; Place 1 applicator (5 g) vaginally At Bedtime for 5 days  Dispense: 70 g; Refill: 0  Start  metronidazole topical cream today  Avoid douching and irritants such as bubble baths or strong soaps.  No intercourse until you have completed treatment.   If not improving in 5 days, follow up with primary care provider.    See Patient Instructions    The benefits, risks and potential side effects were discussed in detail. Black box warnings discussed as relevant. All patient questions were answered. The patient was instructed to follow up immediately if any adverse reactions develop.    Patient verbalizes understanding and agrees with plan of care. Patient stable for discharge.      [facility] Resendiz [facility]

## 2018-06-13 ENCOUNTER — TELEPHONE (OUTPATIENT)
Dept: FAMILY MEDICINE | Facility: CLINIC | Age: 33
End: 2018-06-13

## 2018-06-13 ASSESSMENT — ANXIETY QUESTIONNAIRES: GAD7 TOTAL SCORE: 15

## 2018-06-13 ASSESSMENT — PATIENT HEALTH QUESTIONNAIRE - PHQ9: SUM OF ALL RESPONSES TO PHQ QUESTIONS 1-9: 21

## 2018-06-13 NOTE — TELEPHONE ENCOUNTER
Reason for Call:  Request for results:    Name of test or procedure: Beta HCG Qual IFA Urine, Wet Prep, Neisseria Gonorrhoeae PCR, and Chlamydia Trachomatis PCR,     Date of test of procedure: 06/12/18    Location of the test or procedure: [facility]    OK to leave the result message on voice mail or with a family member? YES    Phone number Patient can be reached at:  Home number on file [phone] (home)    Additional comments: Pt calling for she came in for a lab apt on 06/12/18 and would like a call back to discuss lab results.     Call taken on 6/13/2018 at 1:42 PM by Diogenes Schumacher

## 2018-06-13 NOTE — TELEPHONE ENCOUNTER
This writer attempted to contact patient on 06/13/18    Reason for call plan of care ( see provider's note below) and left message.    If patient calls back:   Patient contacted by 1st floor [facility] Team (MA/TC). Inform patient that someone from the team will contact them, document that pt called and route to care team.     Maribell Dumont MA

## 2018-06-13 NOTE — TELEPHONE ENCOUNTER
Patient informed of provider's message. Patient states she was calling for her x ray result. Informed her provider's notes states normal x-ray. Patient is wondering what the next step is, states she is still having pain in the hip and what is the next step now that x ray is normal.   Route to provider to advise.    Maribell Dumont MA

## 2018-06-13 NOTE — TELEPHONE ENCOUNTER
Please call Armida - see message below    -we discussed wet prep (bacterial vaginosis) and pregnancy test (negative) yesterday    -chlamydia and gonorrhea not back yet. We'll notify her when they are resulted

## 2018-06-13 NOTE — TELEPHONE ENCOUNTER
Please remind her that I recommended physical therapy yesterday. The order was placed yesterday - they will be reaching out to schedule. I also sent in Advil 600 mg for pain. She should be icing as well. If no improvement in 1-2 weeks, will consider ortho referral. We discussed this all during her visit, but I'm happy to answer any questions that she may have.

## 2018-06-14 NOTE — TELEPHONE ENCOUNTER
Call patient and informed of Mayuri's message below.  Patient stated understood.    Froilan Castillo MA

## 2018-07-23 ENCOUNTER — TELEPHONE (OUTPATIENT)
Dept: FAMILY MEDICINE | Facility: CLINIC | Age: 33
End: 2018-07-23

## 2018-07-23 NOTE — LETTER
July 23, 2018      Armida Quiñonez  [address] [address]        Dear Armida Quiñonez,      At [facility] we care about your health and are committed to providing quality patient care. It has come to our attention that you are due for an Asthma Control Test.     This screening tool helps us to assess how well your asthma is controlled. Good asthma control leads to less asthma symptoms and greater health. If your asthma is not in good control (score less than 20) it is recommended you be seen by your provider for medication and lifestyle adjustments    We have enclosed an  Asthma Control Test. Please complete the enclosed asthma control test even if you are feeling well. You can mail it back to our clinic or drop if off at our .     Thank you for your time and we hope this letter finds you well!      Sincerely,    Your Team at [facility]

## 2018-08-04 ASSESSMENT — ASTHMA QUESTIONNAIRES: ACT_TOTALSCORE: 21

## 2018-10-22 ENCOUNTER — RADIANT APPOINTMENT (OUTPATIENT)
Dept: GENERAL RADIOLOGY | Facility: CLINIC | Age: 33
End: 2018-10-22
Attending: FAMILY MEDICINE
Payer: COMMERCIAL

## 2018-10-22 ENCOUNTER — OFFICE VISIT (OUTPATIENT)
Dept: FAMILY MEDICINE | Facility: CLINIC | Age: 33
End: 2018-10-22
Payer: COMMERCIAL

## 2018-10-22 VITALS
RESPIRATION RATE: 16 BRPM | HEIGHT: 63 IN | BODY MASS INDEX: 43.84 KG/M2 | HEART RATE: 73 BPM | TEMPERATURE: 97.9 F | SYSTOLIC BLOOD PRESSURE: 120 MMHG | WEIGHT: 247.4 LBS | OXYGEN SATURATION: 96 % | DIASTOLIC BLOOD PRESSURE: 76 MMHG

## 2018-10-22 DIAGNOSIS — B96.89 BACTERIAL VAGINOSIS: Primary | ICD-10-CM

## 2018-10-22 DIAGNOSIS — N76.0 VAGINITIS AND VULVOVAGINITIS: ICD-10-CM

## 2018-10-22 DIAGNOSIS — L30.1 DYSHIDROTIC ECZEMA: ICD-10-CM

## 2018-10-22 DIAGNOSIS — M25.561 CHRONIC PAIN OF RIGHT KNEE: ICD-10-CM

## 2018-10-22 DIAGNOSIS — G89.29 CHRONIC PAIN OF RIGHT KNEE: ICD-10-CM

## 2018-10-22 DIAGNOSIS — N76.0 BACTERIAL VAGINOSIS: Primary | ICD-10-CM

## 2018-10-22 DIAGNOSIS — Z23 NEED FOR PROPHYLACTIC VACCINATION AND INOCULATION AGAINST INFLUENZA: ICD-10-CM

## 2018-10-22 LAB
SPECIMEN SOURCE: ABNORMAL
WET PREP SPEC: ABNORMAL

## 2018-10-22 PROCEDURE — 90471 IMMUNIZATION ADMIN: CPT | Performed by: FAMILY MEDICINE

## 2018-10-22 PROCEDURE — 90686 IIV4 VACC NO PRSV 0.5 ML IM: CPT | Performed by: FAMILY MEDICINE

## 2018-10-22 PROCEDURE — 73562 X-RAY EXAM OF KNEE 3: CPT | Mod: RT

## 2018-10-22 PROCEDURE — 99214 OFFICE O/P EST MOD 30 MIN: CPT | Mod: 25 | Performed by: FAMILY MEDICINE

## 2018-10-22 PROCEDURE — 87210 SMEAR WET MOUNT SALINE/INK: CPT | Performed by: FAMILY MEDICINE

## 2018-10-22 RX ORDER — TRIAMCINOLONE ACETONIDE 1 MG/G
CREAM TOPICAL
Qty: 50 G | Refills: 4 | Status: SHIPPED | OUTPATIENT
Start: 2018-10-22 | End: 2019-10-07

## 2018-10-22 RX ORDER — METRONIDAZOLE 7.5 MG/G
1 GEL VAGINAL AT BEDTIME
Qty: 70 G | Refills: 0 | Status: SHIPPED | OUTPATIENT
Start: 2018-10-22 | End: 2019-04-01

## 2018-10-22 ASSESSMENT — ANXIETY QUESTIONNAIRES
GAD7 TOTAL SCORE: 17
7. FEELING AFRAID AS IF SOMETHING AWFUL MIGHT HAPPEN: MORE THAN HALF THE DAYS
3. WORRYING TOO MUCH ABOUT DIFFERENT THINGS: NEARLY EVERY DAY
2. NOT BEING ABLE TO STOP OR CONTROL WORRYING: NEARLY EVERY DAY
6. BECOMING EASILY ANNOYED OR IRRITABLE: NEARLY EVERY DAY
5. BEING SO RESTLESS THAT IT IS HARD TO SIT STILL: NOT AT ALL
1. FEELING NERVOUS, ANXIOUS, OR ON EDGE: NEARLY EVERY DAY

## 2018-10-22 ASSESSMENT — PATIENT HEALTH QUESTIONNAIRE - PHQ9: 5. POOR APPETITE OR OVEREATING: NEARLY EVERY DAY

## 2018-10-22 ASSESSMENT — PAIN SCALES - GENERAL: PAINLEVEL: WORST PAIN (10)

## 2018-10-22 NOTE — MR AVS SNAPSHOT
After Visit Summary   10/22/2018    Armida Quiñonez    MRN: [id number]           Patient Information     Date Of Birth          1985        Visit Information        Provider Department      10/22/2018 11:40 AM Catherine Gil MD [facility]        Today's Diagnoses     Bacterial vaginosis    -  1    Chronic pain of right knee        Dyshidrotic eczema        Vaginitis and vulvovaginitis        Need for prophylactic vaccination and inoculation against influenza          Care Instructions    At [facility], we strive to deliver an exceptional experience to you, every time we see you.  If you receive a survey in the mail, please send us back your thoughts. We really do value your feedback.    Your care team:                            Family Medicine Internal Medicine   MD Francisco Javier Abreu MD Shantel Branch-Fleming, MD Katya Georgiev PA-C Megan Hill, APRN CNP    Delfino Bah MD Pediatrics   Otis Campbell, PASAMEER Stevenson, CNP MD Afia Gutiérrez APRN CNP   MD Toshia Turcios MD Deborah Mielke, MD Kim Thein, APRN [facility]      Clinic hours: Monday - Thursday 7 am-7 pm; Fridays 7 am-5 pm.   Urgent care: Monday - Friday 11 am-9 pm; Saturday and Sunday 9 am-5 pm.  Pharmacy : Monday -Thursday 8 am-8 pm; Friday 8 am-6 pm; Saturday and Sunday 9 am-5 pm.     Clinic: [phone]   Pharmacy: [phone]                Follow-ups after your visit        Additional Services     [facility] PT, HAND, AND CHIROPRACTIC REFERRAL       Physical Therapy, Hand Therapy and Chiropractic Care are available through:  *[facility]  *Hand Therapy (Occupational Therapy or Physical Therapy)  *[facility]    Call one number to schedule at any of the above locations: [phone].    Physical therapy, Hand therapy and/or Chiropractic care has been recommended by your physician as an  excellent treatment option to reduce pain and help people return to normal activities, including sports.  Therapy and/or chiropractic care services are a great complement or alternative to expensive and invasive surgery, injections, or long-term use of prescription medications. The primary goal is to identify the underlying problem and provide you the tools to manage your condition on your own.     Please be aware that coverage of these services is subject to the terms and limitations of your health insurance plan.  Call member services at your health plan with any benefit or coverage questions.      Please bring the following to your appointment:  *Your personal calendar for scheduling future appointments  *Comfortable clothing                  Follow-up notes from your care team     Return in about 6 weeks (around 12/3/2018) for knee follow up.      Future tests that were ordered for you today     Open Future Orders        Priority Expected Expires Ordered    [facility] PT[facility] HAND, AND CHIROPRACTIC REFERRAL Routine  10/22/2019 10/22/2018            Who to contact     If you have questions or need follow up information about today's clinic visit or your schedule please contact [facility] directly at [phone].  Normal or non-critical lab and imaging results will be communicated to you by [facility], letter or phone within 4 business days after the clinic has received the results. If you do not hear from us within 7 days, please contact the clinic through [facility] or phone. If you have a critical or abnormal lab result, we will notify you by phone as soon as possible.  Submit refill requests through [employer] or call your pharmacy and they will forward the refill request to us. Please allow 3 business days for your refill to be completed.          Additional Information About Your Visit        [employer]K Information     [employer] gives you secure access to your electronic health record. If you see a primary care  "provider, you can also send messages to your care team and make appointments. If you have questions, please call your primary care clinic.  If you do not have a primary care provider, please call [phone] and they will assist you.        Care EveryWhere ID     This is your Care EveryWhere ID. This could be used by other organizations to access your [facility] medical records  [id number]        Your Vitals Were     Pulse Temperature Respirations Height Last Period Pulse Oximetry    73 97.9  F (36.6  C) (Oral) 16 5' 3\" (1.6 m) 10/03/2018 96%    Breastfeeding? BMI (Body Mass Index)                No 43.82 kg/m2           Blood Pressure from Last 3 Encounters:   10/22/18 120/76   06/12/18 124/79   04/04/18 119/73    Weight from Last 3 Encounters:   10/22/18 247 lb 6.4 oz (112.2 kg)   06/12/18 227 lb (103 kg)   04/04/18 221 lb (100.2 kg)              We Performed the Following     Asthma Action Plan (AAP)     Wet prep     XR Knee Right 3 Views          Today's Medication Changes          These changes are accurate as of 10/22/18 12:22 PM.  If you have any questions, ask your nurse or doctor.               Start taking these medicines.        Dose/Directions    metroNIDAZOLE 0.75 % vaginal gel   Commonly known as:  METROGEL   Used for:  Bacterial vaginosis   Started by:  Catherine Gil MD        Dose:  1 applicator   Place 1 applicator (5 g) vaginally At Bedtime   Quantity:  70 g   Refills:  0       triamcinolone 0.1 % cream   Commonly known as:  KENALOG   Used for:  Dyshidrotic eczema   Started by:  Catherine Gil MD        Apply sparingly to rash on hands 2 - 3 times daily until rash is gone.   Quantity:  50 g   Refills:  4            Where to get your medicines      [place] [facility] [place] [address]  [address], [facility] [address]     Phone:  [phone]     metroNIDAZOLE 0.75 % vaginal gel    " triamcinolone 0.1 % cream                Primary Care Provider Office Phone # Fax #    Catherine Gómez Lillian Seo [phone]       [address]  [facility] [address]        Equal Access to Services     MINERVA ROBERT : Hadii aad ku hadasho [facility], waaxda luqadaha, qaybta kaalmada adeegyada, waxay idiin hayaan adeeg khana luisareina lakaran tovar. So [facility] [phone].    ATENCIÓN: Si habla español, tiene a brown disposición servicios gratuitos de asistencia lingüística. Llame al [phone].    We comply with applicable federal civil rights laws and [place] laws. We do not discriminate on the basis of race, color, national origin, age, disability, sex, sexual orientation, or gender identity.            Thank you!     Thank you for choosing [facility]  for your care. Our goal is always to provide you with excellent care. Hearing back from our patients is one way we can continue to improve our services. Please take a few minutes to complete the written survey that you may receive in the mail after your visit with us. Thank you!             Your Updated Medication List - Protect others around you: Learn how to safely use, store and throw away your medicines at www.disposemymeds.org.          This list is accurate as of 10/22/18 12:22 PM.  Always use your most recent med list.                   Brand Name Dispense Instructions for use Diagnosis    albuterol 108 (90 Base) MCG/ACT inhaler    PROAIR HFA/PROVENTIL HFA/VENTOLIN HFA    1 Inhaler    Inhale 2 puffs into the lungs every 6 hours as needed for shortness of breath / dyspnea    Mild persistent asthma, uncomplicated       ibuprofen 600 MG tablet    ADVIL/MOTRIN    30 tablet    Take 1 tablet (600 mg) by mouth every 8 hours as needed for moderate pain    Hip pain, right       metroNIDAZOLE 0.75 % vaginal gel    METROGEL    70 g    Place 1 applicator (5 g) vaginally At Bedtime    Bacterial vaginosis       MULTIPLE vitamin  S/WOMENS  Tabs     100 tablet    Take 1 tablet by mouth daily    Encounter for routine adult health examination without abnormal findings       triamcinolone 0.1 % cream    KENALOG    50 g    Apply sparingly to rash on hands 2 - 3 times daily until rash is gone.    Dyshidrotic eczema

## 2018-10-22 NOTE — PROGRESS NOTES
MsLay Quiñonez,    Your knee xray is normal.    Please contact the clinic if you have additional questions.  Thank you.    Sincerely,    Catherine Seo

## 2018-10-22 NOTE — PROGRESS NOTES
SUBJECTIVE:   Armida Quiñonez is a 33 year old female who presents to clinic today for the following health issues:  Musculoskeletal problem/pain      Duration: years ago young age- notice lately    Description  Location: right knee general    Intensity:  10/10    Accompanying signs and symptoms: swelling and sharp pain, radiate to the ankle    History  Previous similar problem: YES- younger age  Previous evaluation:  none    Precipitating or alleviating factors:  Trauma or overuse: YES  Aggravating factors include: after work- walking, sit, getting up will aggravate    Therapies tried and outcome: massage, ice, ibuprofen Sitting on bended knee which hurts but helps some to.      Vaginal Symptoms      Duration: awhile 2 x weeks or more    Description  vaginal discharge - creamy and itching    Intensity:  severe    Accompanying signs and symptoms (fever/dysuria/abdominal or back pain): back and abdominal pain    History  Sexually active: yes, but not for 6 months  Possibility of pregnancy: No  Recent antibiotic use: no     Precipitating or alleviating factors: None    Therapies tried and outcome: none   Outcome: n/a      Rash on hands for few weeks. Working as a  so hand are wet more.    Asthma - stable with prn albuterol only.    Problem list and histories reviewed & adjusted, as indicated.  Additional history: as documented    Patient Active Problem List   Diagnosis     CARDIOVASCULAR SCREENING; LDL GOAL LESS THAN 160     Morbid obesity (H)     Mild persistent asthma, uncomplicated     Bipolar 2 disorder (H)     DEMETRI (generalized anxiety disorder)     Dyshidrotic eczema     Past Surgical History:   Procedure Laterality Date     DILATION AND CURETTAGE SUCTION, TREAT INCOMPLETE        HC COLP CERVIX/UPPER VAGINA W BX CERVIX  2012    neg     HC INSERTION INTRAUTERINE DEVICE      Mirena     HC LAPAROSCOPY, SURGICAL; APPENDECTOMY       HC REMOVE INTRAUTERINE DEVICE         Social History  "  Substance Use Topics     Smoking status: Former Smoker     Packs/day: 0.50     Years: 3.00     Types: Cigarettes     Quit date: 5/13/2011     Smokeless tobacco: Never Used     Alcohol use 0.0 oz/week     0 Standard drinks or equivalent per week      Comment: occassionally     Family History   Problem Relation Age of Onset     Cancer Mother      Cancer Maternal Grandmother      Unknown/Adopted No family hx of      Diabetes No family hx of      Hypertension No family hx of      Cerebrovascular Disease No family hx of      Thyroid Disease No family hx of      Glaucoma No family hx of      Macular Degeneration No family hx of            Reviewed and updated as needed this visit by clinical staff  Tobacco  Allergies  Meds  Problems       Reviewed and updated as needed this visit by Provider  Allergies  Meds  Problems         ROS:  Constitutional, HEENT, cardiovascular, pulmonary, gi and gu systems are negative, except as otherwise noted.    OBJECTIVE:     /76 (BP Location: Left arm, Patient Position: Chair, Cuff Size: Adult Large)  Pulse 73  Temp 97.9  F (36.6  C) (Oral)  Resp 16  Ht 5' 3\" (1.6 m)  Wt 247 lb 6.4 oz (112.2 kg)  LMP 10/03/2018  SpO2 96%  Breastfeeding? No  BMI 43.82 kg/m2  Body mass index is 43.82 kg/(m^2).  GENERAL: healthy, alert, no distress and obese  NECK: no adenopathy, no asymmetry, masses, or scars and thyroid normal to palpation  RESP: lungs clear to auscultation - no rales, rhonchi or wheezes  CV: regular rate and rhythm, normal S1 S2, no S3 or S4, no murmur, click or rub, no peripheral edema and peripheral pulses strong  ABDOMEN: soft, nontender, no hepatosplenomegaly, no masses and bowel sounds normal  MS: minor effusion right knee and patient noted pain with palpation but no facial change.  SKIN: raised papular blister like rash on dorsum bilateral hands.  PSYCH: mentation appears normal, affect normal/bright    Diagnostic Test Results:  none     ASSESSMENT/PLAN:     1. " Bacterial vaginosis  Vaginal treatment  - metroNIDAZOLE (METROGEL) 0.75 % vaginal gel; Place 1 applicator (5 g) vaginally At Bedtime  Dispense: 70 g; Refill: 0    2. Chronic pain of right knee  Xray and PT  - XR Knee Right 3 Views  - [facility] PT, HAND, AND CHIROPRACTIC REFERRAL; Future    3. Dyshidrotic eczema  Topical treatment  - triamcinolone (KENALOG) 0.1 % cream; Apply sparingly to rash on hands 2 - 3 times daily until rash is gone.  Dispense: 50 g; Refill: 4    4. Vaginitis and vulvovaginitis    - Wet prep    5. Need for prophylactic vaccination and inoculation against influenza  MA given    The uses and side effects, including black box warnings as appropriate, were discussed in detail.  All patient questions were answered.  The patient was instructed to call immediately if any side effects developed.       FUTURE APPOINTMENTS:       - Follow-up visit in 6 weeks for reassessment.    Catherine Seo MD  [facility]

## 2018-10-22 NOTE — PATIENT INSTRUCTIONS
At [facility], we strive to deliver an exceptional experience to you, every time we see you.  If you receive a survey in the mail, please send us back your thoughts. We really do value your feedback.    Your care team:                            Family Medicine Internal Medicine   MD Francisco Javier Abreu MD Shantel Branch-Fleming, MD Katya Georgiev PA-C Megan Hill, APRN JANE Bah MD Pediatrics   Otis Campbell, JACKLYN Stevenson, MD Afia Lowe APRN CNP   MD Toshia Turcios MD Deborah Mielke, MD Darline Colvin, APRN [facility]      Clinic hours: Monday - Thursday 7 am-7 pm; Fridays 7 am-5 pm.   Urgent care: Monday - Friday 11 am-9 pm; Saturday and Sunday 9 am-5 pm.  Pharmacy : Monday -Thursday 8 am-8 pm; Friday 8 am-6 pm; Saturday and Sunday 9 am-5 pm.     Clinic: [phone]   Pharmacy: [phone]

## 2018-10-22 NOTE — PROGRESS NOTES

## 2018-10-23 ASSESSMENT — ANXIETY QUESTIONNAIRES: GAD7 TOTAL SCORE: 17

## 2018-10-23 ASSESSMENT — PATIENT HEALTH QUESTIONNAIRE - PHQ9: SUM OF ALL RESPONSES TO PHQ QUESTIONS 1-9: 8

## 2018-10-29 ENCOUNTER — THERAPY VISIT (OUTPATIENT)
Dept: PHYSICAL THERAPY | Facility: CLINIC | Age: 33
End: 2018-10-29
Attending: FAMILY MEDICINE
Payer: COMMERCIAL

## 2018-10-29 DIAGNOSIS — M25.571 PAIN IN JOINT, ANKLE AND FOOT, RIGHT: Primary | ICD-10-CM

## 2018-10-29 DIAGNOSIS — M25.561 CHRONIC PAIN OF RIGHT KNEE: ICD-10-CM

## 2018-10-29 DIAGNOSIS — G89.29 CHRONIC PAIN OF RIGHT KNEE: ICD-10-CM

## 2018-10-29 DIAGNOSIS — R26.9 UNSPECIFIED ABNORMALITIES OF GAIT AND MOBILITY: ICD-10-CM

## 2018-10-29 PROCEDURE — 97162 PT EVAL MOD COMPLEX 30 MIN: CPT | Mod: GP | Performed by: PHYSICAL THERAPIST

## 2018-10-29 PROCEDURE — 97110 THERAPEUTIC EXERCISES: CPT | Mod: GP | Performed by: PHYSICAL THERAPIST

## 2018-10-29 PROCEDURE — 97140 MANUAL THERAPY 1/> REGIONS: CPT | Mod: GP | Performed by: PHYSICAL THERAPIST

## 2018-10-29 ASSESSMENT — ACTIVITIES OF DAILY LIVING (ADL)
GO UP STAIRS: ACTIVITY IS FAIRLY DIFFICULT
LIMPING: THE SYMPTOM PREVENTS ME FROM ALL DAILY ACTIVITIES
KNEEL ON THE FRONT OF YOUR KNEE: I AM UNABLE TO DO THE ACTIVITY
GIVING WAY, BUCKLING OR SHIFTING OF KNEE: NOT ANSWERED
GO DOWN STAIRS: ACTIVITY IS FAIRLY DIFFICULT
STAND: ACTIVITY IS VERY DIFFICULT
HOW_WOULD_YOU_RATE_THE_OVERALL_FUNCTION_OF_YOUR_KNEE_DURING_YOUR_USUAL_DAILY_ACTIVITIES?: SEVERELY ABNORMAL
SIT WITH YOUR KNEE BENT: I AM UNABLE TO DO THE ACTIVITY
WEAKNESS: THE SYMPTOM PREVENTS ME FROM ALL DAILY ACTIVITIES
SWELLING: THE SYMPTOM PREVENTS ME FROM ALL DAILY ACTIVITIES
KNEE_ACTIVITY_OF_DAILY_LIVING_SCORE: 9.23
STIFFNESS: THE SYMPTOM PREVENTS ME FROM ALL DAILY ACTIVITIES
RAW_SCORE: 6.46
SQUAT: I AM UNABLE TO DO THE ACTIVITY
PAIN: THE SYMPTOM PREVENTS ME FROM ALL DAILY ACTIVITIES
AS_A_RESULT_OF_YOUR_KNEE_INJURY,_HOW_WOULD_YOU_RATE_YOUR_CURRENT_LEVEL_OF_DAILY_ACTIVITY?: SEVERELY ABNORMAL
KNEE_ACTIVITY_OF_DAILY_LIVING_SUM: 6
RISE FROM A CHAIR: I AM UNABLE TO DO THE ACTIVITY
WALK: ACTIVITY IS VERY DIFFICULT
HOW_WOULD_YOU_RATE_THE_CURRENT_FUNCTION_OF_YOUR_KNEE_DURING_YOUR_USUAL_DAILY_ACTIVITIES_ON_A_SCALE_FROM_0_TO_100_WITH_100_BEING_YOUR_LEVEL_OF_KNEE_FUNCTION_PRIOR_TO_YOUR_INJURY_AND_0_BEING_THE_INABILITY_TO_PERFORM_ANY_OF_YOUR_USUAL_DAILY_ACTIVITIES?: 80

## 2018-10-29 NOTE — MR AVS SNAPSHOT
After Visit Summary   10/29/2018    Armida Quiñonez    MRN: [id number]           Patient Information     Date Of Birth          1985        Visit Information        Provider Department      10/29/2018 12:10 PM Raissa Hilliard PT [facility] [place]        Today's Diagnoses     Pain in joint, ankle and foot, right    -  1    Chronic pain of right knee        Unspecified abnormalities of gait and mobility           Follow-ups after your visit        Your next 10 appointments already scheduled     Nov 09, 2018  9:30 AM CST   MAGDALENA Extremity with Raissa Hilliard PT   [facility] ([facility]  )    [address] [address]   [phone]            Nov 12, 2018  9:30 AM CST   MAGDALENA Extremity with Raissa Hilliard PT   [facility] ([facility]  )    [address] [place] [id number] [phone]            Nov 19, 2018 10:10 AM CST   MAGDALENA Extremity with Raissa Hilliard PT   [facility] ([facility]  )    [address] [place] [id number]   [phone]              Who to contact     If you have questions or need follow up information about today's clinic visit or your schedule please contact [facility] directly at [phone].  Normal or non-critical lab and imaging results will be communicated to you by MyChart, letter or phone within 4 business days after the clinic has received the results. If you do not hear from us within 7 days, please contact the clinic through MyChart or phone. If you have a critical or abnormal lab result, we will notify you by phone as soon as possible.  Submit refill requests through [employer] or call your pharmacy and they will forward the refill request to us. Please allow  3 business days for your refill to be completed.          Additional Information About Your Visit        MyChart Information     [employer] gives you secure access to your electronic health record. If you see a primary care provider, you can also send messages to your care team and make appointments. If you have questions, please call your primary care clinic.  If you do not have a primary care provider, please call [phone] and they will assist you.        Care EveryWhere ID     This is your Care EveryWhere ID. This could be used by other organizations to access your [facility] medical records  [id number]        Your Vitals Were     Last Period                   10/03/2018            Blood Pressure from Last 3 Encounters:   10/22/18 120/76   06/12/18 124/79   04/04/18 119/73    Weight from Last 3 Encounters:   10/22/18 112.2 kg (247 lb 6.4 oz)   06/12/18 103 kg (227 lb)   04/04/18 100.2 kg (221 lb)              We Performed the Following     MAGDALENA Inital Eval Report     [facility] PT, HAND, AND CHIROPRACTIC REFERRAL     Manual Ther Tech, 1+Regions, EA 15 min     PT Eval, Moderate Complexity (00866)     Therapeutic Exercises        Primary Care Provider Office Phone # Fax #    Catherine Dalia Seo [phone] [address]  [facility] [address]        Equal Access to Services     MINERVA ROBERT : Hadii aad ku hadasho Soomaali, waaxda luqadaha, qaybta kaalmada adeegyada, tiago tovar. So [facility] [phone].    ATENCIÓN: Si habla español, tiene a brown disposición servicios gratuitos de asistencia lingüística. Llame al [phone].    We comply with applicable federal civil rights laws and [place] laws. We do not discriminate on the basis of race, color, national origin, age, disability, sex, sexual orientation, or gender identity.            Thank you!     Thank you for choosing [facility]  for your  care. Our goal is always to provide you with excellent care. Hearing back from our patients is one way we can continue to improve our services. Please take a few minutes to complete the written survey that you may receive in the mail after your visit with us. Thank you!             Your Updated Medication List - Protect others around you: Learn how to safely use, store and throw away your medicines at www.disposemymeds.org.          This list is accurate as of 10/29/18  1:46 PM.  Always use your most recent med list.                   Brand Name Dispense Instructions for use Diagnosis    albuterol 108 (90 Base) MCG/ACT inhaler    PROAIR HFA/PROVENTIL HFA/VENTOLIN HFA    1 Inhaler    Inhale 2 puffs into the lungs every 6 hours as needed for shortness of breath / dyspnea    Mild persistent asthma, uncomplicated       ibuprofen 600 MG tablet    ADVIL/MOTRIN    30 tablet    Take 1 tablet (600 mg) by mouth every 8 hours as needed for moderate pain    Hip pain, right       metroNIDAZOLE 0.75 % vaginal gel    METROGEL    70 g    Place 1 applicator (5 g) vaginally At Bedtime    Bacterial vaginosis       MULTIPLE vitamin  S/WOMENS Tabs     100 tablet    Take 1 tablet by mouth daily    Encounter for routine adult health examination without abnormal findings       triamcinolone 0.1 % cream    KENALOG    50 g    Apply sparingly to rash on hands 2 - 3 times daily until rash is gone.    Dyshidrotic eczema

## 2018-10-29 NOTE — PROGRESS NOTES
[facility] Initial Evaluation  Subjective:  Patient is a 33 year old female presenting with rehab right knee hpi. The history is provided by the patient.   Armida Quiñonez is a 33 year old female with a right knee condition.  Condition occurred with:  Insidious onset.  Condition occurred: other.  This is a chronic condition  Date of orders 10/29/18    Long standing hx of R knee pain. Laying down painful, Sitting painful. Sometimes feel better to put pressure on it. Complains that R leg doesn't feel like the L.    PMH: R ankle fx, tibia fx in 7th grade, remembers being casted and not doing physical therapy. Overweight (recent weight gain), depression.     Social:  at [employer], moved here in June..    Patient reports pain:  Posterior.  Radiates to:  Lower leg and ankle.  Pain is described as aching and cramping  and reported as 8/10.  Associated symptoms:  Loss of motion/stiffness, edema, buckling/giving out, tingling and numbness (more recently; ankle numb and tingling, R leg feels heavy). Pain is worse in the P.M..  Symptoms are exacerbated by lying on the extremity, standing, sitting and weight bearing Relieved by: waits to the last possible moment to take meds.  Since onset symptoms are gradually worsening.  Special tests:  MRI and x-ray (previous knee images).      General health as reported by patient is poor.                                              Objective:  Standing Alignment:              Knee:  Genu recurvatum R and genu recuvatum L  Ankle/Foot:  Pes planus R and pes planus L  General Deviations:  Toe in R and toe in L  Gait:  Inc inversion R ankle, improves with inc speed  Weight Bearing Status:  WBAT     Deviations:  Pelvis:  Incr pelvic rotationHip:  Trendelenberg L and Trendelenberg RAnkle:  Supination incr RGeneral Deviations:  Toe in R          Ankle/Foot Evaluation  ROM:        Strength wnl ankle: R ankle 3/5 eversion, 3/5 inv with muscle cramp, 4/5 EHL. L ankle  5/5 all directions. Glute med 3+/5 R, 4+/5 L.  LIGAMENT TESTING:   Anterior Drawer (ATF) Right: pos    Varus Stress (Calc Fib) Right: pos          PALPATION:     Right ankle tenderness present at:   anterior talofibular ligament; posterior talofibular ligament; calcaneofibular ligament and anterior tibiofibular ligament  EDEMA: Edema ankle: globally edematous B LE.          MOBILITY TESTING:       Talocrural Right: hypermobile      First Ray Right: hypomobile  FUNCTIONAL TESTS:           Proprioception:  Stork Balance Test: Right: inc knee ext, inc pronation, inc pain                                          Hip Evaluation  Hip PROM:  Hip PROM:  Left Hip:    Normal  Right Hip:  Normal                            Hip Special Testing:      Left hip negative for the following special tests:  Goyo or Fadir/Labrum  Right hip negative for the following special tests:  Goyo or Fadir/Labrum           Knee Evaluation:  ROM:  AROM: normal  PROM: normal                  Palpation:      Right knee tenderness present at:  Patellar Superior            General     ROS    Assessment/Plan:    Patient is a 33 year old female with right side knee complaints.    Patient has the following significant findings with corresponding treatment plan.                Diagnosis 1:  R knee pain consistent with mechanical knee pain secondary to chronic R ankle instability and pain  Pain -  hot/cold therapy, manual therapy, splint/taping/bracing/orthotics, self management, education and home program  Decreased ROM/flexibility - manual therapy, therapeutic exercise and home program  Decreased joint mobility - manual therapy, therapeutic exercise and home program  Decreased strength - therapeutic exercise, therapeutic activities and home program  Impaired balance - neuro re-education, gait training, therapeutic activities and home program  Decreased proprioception - neuro re-education, gait training, therapeutic activities and home program  Edema -  cold therapy and self management/home program  Impaired gait - gait training and home program  Instability -  Therapeutic Activity  Therapeutic Exercise  Neuromuscular Re-education  Splinting/Taping/Bracing/Orthotic  home program    Therapy Evaluation Codes:   1) History comprised of:   Personal factors that impact the plan of care:      Age, Anxiety, Coping style, Gender, Living environment, Overall behavior pattern, Social history/culture, Time since onset of symptoms and Work status.    Comorbidity factors that impact the plan of care are:      Asthma, Depression and Overweight.     Medications impacting care: None.  2) Examination of Body Systems comprised of:   Body structures and functions that impact the plan of care:      Ankle, Knee and Pelvis.   Activity limitations that impact the plan of care are:      Bathing, Cooking, Driving, Lifting, Sitting, Squatting/kneeling, Stairs, Standing, Walking and Sleeping.  3) Clinical presentation characteristics are:   Evolving/Changing.  4) Decision-Making    Moderate complexity using standardized patient assessment instrument and/or measureable assessment of functional outcome.  Cumulative Therapy Evaluation is: Moderate complexity.    Previous and current functional limitations:  (See Goal Flow Sheet for this information)    Short term and Long term goals: (See Goal Flow Sheet for this information)     Communication ability:  Patient appears to be able to clearly communicate and understand verbal and written communication and follow directions correctly.  Treatment Explanation - The following has been discussed with the patient:   RX ordered/plan of care  Anticipated outcomes  Possible risks and side effects  This patient would benefit from PT intervention to resume normal activities.   Rehab potential is good.    Frequency:  1 X week, once daily  Duration:  for 6 weeks tapering to 2 X a month over 6 weeks  Discharge Plan:  Achieve all LTG.  Independent in home  treatment program.  Reach maximal therapeutic benefit.    Please refer to the daily flowsheet for treatment today, total treatment time and time spent performing 1:1 timed codes.

## 2018-10-30 NOTE — PROGRESS NOTES
[facility] Initial Evaluation  Subjective:  Patient is a 33 year old female presenting with rehab left ankle/foot hpi.                                      Pertinent medical history includes:  Asthma, depression and overweight.  Medical allergies: yes (Latex).    Current medications:  None as reported by patient.  Current occupation is .    Primary job tasks include:  Prolonged standing, repetitive tasks and lifting.                                Objective:  System    Physical Exam    General     ROS    Assessment/Plan:

## 2018-11-19 ENCOUNTER — THERAPY VISIT (OUTPATIENT)
Dept: PHYSICAL THERAPY | Facility: CLINIC | Age: 33
End: 2018-11-19
Payer: COMMERCIAL

## 2018-11-19 DIAGNOSIS — M25.571 PAIN IN JOINT, ANKLE AND FOOT, RIGHT: ICD-10-CM

## 2018-11-19 DIAGNOSIS — G89.29 CHRONIC PAIN OF RIGHT KNEE: ICD-10-CM

## 2018-11-19 DIAGNOSIS — M25.561 CHRONIC PAIN OF RIGHT KNEE: ICD-10-CM

## 2018-11-19 DIAGNOSIS — R26.9 UNSPECIFIED ABNORMALITIES OF GAIT AND MOBILITY: ICD-10-CM

## 2018-11-19 PROCEDURE — 97110 THERAPEUTIC EXERCISES: CPT | Mod: GP | Performed by: PHYSICAL THERAPIST

## 2018-11-19 PROCEDURE — 97140 MANUAL THERAPY 1/> REGIONS: CPT | Mod: GP | Performed by: PHYSICAL THERAPIST

## 2018-11-27 ENCOUNTER — THERAPY VISIT (OUTPATIENT)
Dept: PHYSICAL THERAPY | Facility: CLINIC | Age: 33
End: 2018-11-27
Payer: COMMERCIAL

## 2018-11-27 DIAGNOSIS — G89.29 CHRONIC PAIN OF RIGHT KNEE: ICD-10-CM

## 2018-11-27 DIAGNOSIS — M25.561 CHRONIC PAIN OF RIGHT KNEE: ICD-10-CM

## 2018-11-27 DIAGNOSIS — R26.9 UNSPECIFIED ABNORMALITIES OF GAIT AND MOBILITY: ICD-10-CM

## 2018-11-27 DIAGNOSIS — M25.571 PAIN IN JOINT, ANKLE AND FOOT, RIGHT: ICD-10-CM

## 2018-11-27 PROCEDURE — 97110 THERAPEUTIC EXERCISES: CPT | Mod: GP | Performed by: PHYSICAL THERAPIST

## 2018-12-04 ENCOUNTER — THERAPY VISIT (OUTPATIENT)
Dept: PHYSICAL THERAPY | Facility: CLINIC | Age: 33
End: 2018-12-04
Payer: COMMERCIAL

## 2018-12-04 DIAGNOSIS — M25.571 PAIN IN JOINT, ANKLE AND FOOT, RIGHT: ICD-10-CM

## 2018-12-04 DIAGNOSIS — R26.9 UNSPECIFIED ABNORMALITIES OF GAIT AND MOBILITY: ICD-10-CM

## 2018-12-04 DIAGNOSIS — G89.29 CHRONIC PAIN OF RIGHT KNEE: ICD-10-CM

## 2018-12-04 DIAGNOSIS — M25.561 CHRONIC PAIN OF RIGHT KNEE: ICD-10-CM

## 2018-12-04 PROCEDURE — 97110 THERAPEUTIC EXERCISES: CPT | Mod: GP | Performed by: PHYSICAL THERAPIST

## 2018-12-04 PROCEDURE — 97140 MANUAL THERAPY 1/> REGIONS: CPT | Mod: GP | Performed by: PHYSICAL THERAPIST

## 2018-12-17 ENCOUNTER — THERAPY VISIT (OUTPATIENT)
Dept: PHYSICAL THERAPY | Facility: CLINIC | Age: 33
End: 2018-12-17
Payer: COMMERCIAL

## 2018-12-17 DIAGNOSIS — M25.561 CHRONIC PAIN OF RIGHT KNEE: ICD-10-CM

## 2018-12-17 DIAGNOSIS — M25.571 PAIN IN JOINT, ANKLE AND FOOT, RIGHT: ICD-10-CM

## 2018-12-17 DIAGNOSIS — G89.29 CHRONIC PAIN OF RIGHT KNEE: ICD-10-CM

## 2018-12-17 DIAGNOSIS — R26.9 UNSPECIFIED ABNORMALITIES OF GAIT AND MOBILITY: ICD-10-CM

## 2018-12-17 PROCEDURE — 97110 THERAPEUTIC EXERCISES: CPT | Mod: GP | Performed by: PHYSICAL THERAPIST

## 2018-12-17 PROCEDURE — 97140 MANUAL THERAPY 1/> REGIONS: CPT | Mod: GP | Performed by: PHYSICAL THERAPIST

## 2019-03-21 ENCOUNTER — APPOINTMENT (OUTPATIENT)
Dept: OPTOMETRY | Facility: CLINIC | Age: 34
End: 2019-03-21
Payer: COMMERCIAL

## 2019-03-21 PROCEDURE — 92341 FIT SPECTACLES BIFOCAL: CPT | Performed by: OPTOMETRIST

## 2019-04-01 ENCOUNTER — OFFICE VISIT (OUTPATIENT)
Dept: FAMILY MEDICINE | Facility: CLINIC | Age: 34
End: 2019-04-01
Payer: COMMERCIAL

## 2019-04-01 VITALS
DIASTOLIC BLOOD PRESSURE: 74 MMHG | HEIGHT: 63 IN | HEART RATE: 75 BPM | OXYGEN SATURATION: 99 % | SYSTOLIC BLOOD PRESSURE: 114 MMHG | TEMPERATURE: 98.3 F | WEIGHT: 268.2 LBS | BODY MASS INDEX: 47.52 KG/M2

## 2019-04-01 DIAGNOSIS — F31.81 BIPOLAR 2 DISORDER (H): ICD-10-CM

## 2019-04-01 DIAGNOSIS — Z30.015 ENCOUNTER FOR INITIAL PRESCRIPTION OF VAGINAL RING HORMONAL CONTRACEPTIVE: ICD-10-CM

## 2019-04-01 DIAGNOSIS — Z11.3 SCREEN FOR STD (SEXUALLY TRANSMITTED DISEASE): ICD-10-CM

## 2019-04-01 DIAGNOSIS — E66.01 MORBID OBESITY (H): ICD-10-CM

## 2019-04-01 DIAGNOSIS — L21.9 SEBORRHEIC DERMATITIS: Primary | ICD-10-CM

## 2019-04-01 LAB — HCG UR QL: NEGATIVE

## 2019-04-01 PROCEDURE — 36415 COLL VENOUS BLD VENIPUNCTURE: CPT | Performed by: FAMILY MEDICINE

## 2019-04-01 PROCEDURE — 81025 URINE PREGNANCY TEST: CPT | Performed by: FAMILY MEDICINE

## 2019-04-01 PROCEDURE — 87491 CHLMYD TRACH DNA AMP PROBE: CPT | Performed by: FAMILY MEDICINE

## 2019-04-01 PROCEDURE — 86803 HEPATITIS C AB TEST: CPT | Performed by: FAMILY MEDICINE

## 2019-04-01 PROCEDURE — 0064U ANTB TP TOTAL&RPR IA QUAL: CPT | Performed by: FAMILY MEDICINE

## 2019-04-01 PROCEDURE — 87389 HIV-1 AG W/HIV-1&-2 AB AG IA: CPT | Performed by: FAMILY MEDICINE

## 2019-04-01 PROCEDURE — 87591 N.GONORRHOEAE DNA AMP PROB: CPT | Performed by: FAMILY MEDICINE

## 2019-04-01 PROCEDURE — 87340 HEPATITIS B SURFACE AG IA: CPT | Performed by: FAMILY MEDICINE

## 2019-04-01 PROCEDURE — 99214 OFFICE O/P EST MOD 30 MIN: CPT | Performed by: FAMILY MEDICINE

## 2019-04-01 RX ORDER — ETONOGESTREL AND ETHINYL ESTRADIOL VAGINAL RING .015; .12 MG/D; MG/D
1 RING VAGINAL
Qty: 3 EACH | Refills: 3 | Status: SHIPPED | OUTPATIENT
Start: 2019-04-01 | End: 2019-10-07

## 2019-04-01 ASSESSMENT — PAIN SCALES - GENERAL: PAINLEVEL: EXTREME PAIN (8)

## 2019-04-01 ASSESSMENT — MIFFLIN-ST. JEOR: SCORE: 1900.68

## 2019-04-01 NOTE — PATIENT INSTRUCTIONS
At [facility], we strive to deliver an exceptional experience to you, every time we see you.  If you receive a survey in the mail, please send us back your thoughts. We really do value your feedback.    Your care team:                            Family Medicine Internal Medicine   MD Francisco Javier Abreu MD Shantel Branch-Fleming, MD Katya Georgiev PA-C Megan Hill, APRN JANE Bah MD Pediatrics   Otis Campbell, JACKLYN Stevenson, MD Afia Lowe APRN CNP   MD Toshia Turcios MD Deborah Mielke, MD Darline Colvin, APRN [facility]      Clinic hours: Monday - Thursday 7 am-7 pm; Fridays 7 am-5 pm.   Urgent care: Monday - Friday 11 am-9 pm; Saturday and Sunday 9 am-5 pm.  Pharmacy : Monday -Thursday 8 am-8 pm; Friday 8 am-6 pm; Saturday and Sunday 9 am-5 pm.     Clinic: [phone]   Pharmacy: [phone]

## 2019-04-01 NOTE — PROGRESS NOTES
"  SUBJECTIVE:   Armida Quiñonez is a 33 year old female who presents to clinic today for the following health issues:      Vaginal Symptoms/Pelvic Pain  Onset: \"couple days\"    Description:  Vaginal Discharge: none   Itching (Pruritis): YES  Burning sensation:  YES  Odor: no     Accompanying Signs & Symptoms:  Pain with Urination: no   Abdominal Pain: YES  Fever: no     History:   Sexually active: YES  New Partner: no   Possibility of Pregnancy:  Don't Know    Precipitating factors:   Recent Antibiotic Use: no       Therapies Tried and outcome: none        Contraception - getting  in 3 days and would like to get on birth control for now.    Scalp issues - seen and given medicated shampoo.  Has weave in for upcoming wedding so unable to see scalp.    Bipolar 2 - not consistent with treatment but feels life is stable.      Problem list and histories reviewed & adjusted, as indicated.  Additional history: as documented    Patient Active Problem List   Diagnosis     CARDIOVASCULAR SCREENING; LDL GOAL LESS THAN 160     Morbid obesity (H)     Mild persistent asthma, uncomplicated     Bipolar 2 disorder (H)     DEMETRI (generalized anxiety disorder)     Dyshidrotic eczema     Unspecified abnormalities of gait and mobility     Pain in joint, ankle and foot, right     Chronic pain of right knee     Past Surgical History:   Procedure Laterality Date     DILATION AND CURETTAGE SUCTION, TREAT INCOMPLETE        HC COLP CERVIX/UPPER VAGINA W BX CERVIX  2012    neg     HC INSERTION INTRAUTERINE DEVICE      Mirena      LAPAROSCOPY, SURGICAL; APPENDECTOMY       HC REMOVE INTRAUTERINE DEVICE         Social History     Tobacco Use     Smoking status: Former Smoker     Packs/day: 0.50     Years: 3.00     Pack years: 1.50     Types: Cigarettes     Last attempt to quit: 2011     Years since quittin.8     Smokeless tobacco: Never Used   Substance Use Topics     Alcohol use: Yes     Alcohol/week: 0.0 " "oz     Comment: occassionally     Family History   Problem Relation Age of Onset     Cancer Mother      Cancer Maternal Grandmother      Unknown/Adopted No family hx of      Diabetes No family hx of      Hypertension No family hx of      Cerebrovascular Disease No family hx of      Thyroid Disease No family hx of      Glaucoma No family hx of      Macular Degeneration No family hx of            Reviewed and updated as needed this visit by clinical staff  Tobacco  Allergies  Meds  Problems  Med Hx  Surg Hx  Fam Hx  Soc Hx        Reviewed and updated as needed this visit by Provider  Tobacco  Allergies  Meds  Problems  Med Hx  Surg Hx  Fam Hx         ROS:  Constitutional, HEENT, cardiovascular, pulmonary, gi and gu systems are negative, except as otherwise noted.    OBJECTIVE:     /74 (BP Location: Left arm, Patient Position: Chair, Cuff Size: Adult Large)   Pulse 75   Temp 98.3  F (36.8  C) (Oral)   Ht 1.6 m (5' 3\")   Wt 121.7 kg (268 lb 3.2 oz)   LMP 03/04/2019 (Approximate)   SpO2 99%   Breastfeeding? No   BMI 47.51 kg/m    Body mass index is 47.51 kg/m .  GENERAL: healthy, alert, no distress and obese  NECK: no adenopathy, no asymmetry, masses, or scars and thyroid normal to palpation  RESP: lungs clear to auscultation - no rales, rhonchi or wheezes  CV: regular rate and rhythm, normal S1 S2, no S3 or S4, no murmur, click or rub, no peripheral edema and peripheral pulses strong  ABDOMEN: soft, nontender, no hepatosplenomegaly, no masses and bowel sounds normal  MS: no gross musculoskeletal defects noted, no edema  PSYCH: mentation appears normal, affect normal/bright    Diagnostic Test Results:  Results for orders placed or performed in visit on 04/01/19 (from the past 24 hour(s))   HCG Qual, Urine ([id number])   Result Value Ref Range    HCG Qual Urine Negative NEG^Negative       ASSESSMENT/PLAN:     1. Seborrheic dermatitis  Continue topical    2. Morbid obesity (H)  Low carb diet    3. " Bipolar 2 disorder (H)  Monitor     4. Encounter for initial prescription of vaginal ring hormonal contraceptive  Reviewed contraceptive methods including abstinence, OCP, Patch, Nuvaring, Depo-Provera, IUD, condoms, and permanent sterilization.  Reviewed need for back-up contraception for the first month of hormonal methods.  Reviewed that only abstinence and condoms provide protection from STD's.  Patient desires ring for birth control.   - HCG Qual, Urine ([id number])  - etonogestrel-ethinyl estradiol (NUVARING) 0.12-0.015 MG/24HR vaginal ring; Place 1 each vaginally every 28 days  Dispense: 3 each; Refill: 3    5. Screen for STD (sexually transmitted disease)  screening  - Neisseria gonorrhoeae PCR  - Hepatitis C antibody  - Hepatitis B surface antigen  - HIV Antigen Antibody Combo  - Treponema Abs w Reflex to RPR and Titer  - Chlamydia trachomatis PCR    The uses and side effects, including black box warnings as appropriate, were discussed in detail.  All patient questions were answered.  The patient was instructed to call immediately if any side effects developed.     FUTURE APPOINTMENTS:       - Follow-up visit in 2 months for recheck    Catherine Seo MD  [facility]

## 2019-04-02 LAB
C TRACH DNA SPEC QL NAA+PROBE: NEGATIVE
HBV SURFACE AG SERPL QL IA: NONREACTIVE
HCV AB SERPL QL IA: NONREACTIVE
HIV 1+2 AB+HIV1 P24 AG SERPL QL IA: NONREACTIVE
N GONORRHOEA DNA SPEC QL NAA+PROBE: NEGATIVE
SPECIMEN SOURCE: NORMAL
SPECIMEN SOURCE: NORMAL
T PALLIDUM AB SER QL: NONREACTIVE

## 2019-04-03 NOTE — RESULT ENCOUNTER NOTE
MsLay Quiñonez,    Neither hepatitis B, hepatitis C, HIV, syphilis, gonorrhea nor chlamydia were found.     Please contact the clinic if you have additional questions.  Thank you.    Sincerely,    Catherine Seo

## 2019-05-17 ENCOUNTER — OFFICE VISIT (OUTPATIENT)
Dept: FAMILY MEDICINE | Facility: CLINIC | Age: 34
End: 2019-05-17
Payer: COMMERCIAL

## 2019-05-17 ENCOUNTER — AMBULATORY - HEALTHEAST (OUTPATIENT)
Dept: MULTI SPECIALTY CLINIC | Facility: CLINIC | Age: 34
End: 2019-05-17

## 2019-05-17 VITALS
OXYGEN SATURATION: 95 % | DIASTOLIC BLOOD PRESSURE: 68 MMHG | SYSTOLIC BLOOD PRESSURE: 126 MMHG | WEIGHT: 263 LBS | HEART RATE: 66 BPM | HEIGHT: 63 IN | RESPIRATION RATE: 18 BRPM | TEMPERATURE: 98.4 F | BODY MASS INDEX: 46.6 KG/M2

## 2019-05-17 DIAGNOSIS — Z31.69 INFERTILITY COUNSELING: ICD-10-CM

## 2019-05-17 DIAGNOSIS — R10.2 PELVIC CRAMPING: Primary | ICD-10-CM

## 2019-05-17 DIAGNOSIS — F31.81 BIPOLAR 2 DISORDER (H): Chronic | ICD-10-CM

## 2019-05-17 DIAGNOSIS — Z12.4 SCREENING FOR MALIGNANT NEOPLASM OF CERVIX: ICD-10-CM

## 2019-05-17 DIAGNOSIS — B96.89 BV (BACTERIAL VAGINOSIS): ICD-10-CM

## 2019-05-17 DIAGNOSIS — N76.0 BV (BACTERIAL VAGINOSIS): ICD-10-CM

## 2019-05-17 LAB
ALBUMIN UR-MCNC: NEGATIVE MG/DL
APPEARANCE UR: CLEAR
BILIRUB UR QL STRIP: NEGATIVE
COLOR UR AUTO: YELLOW
ERYTHROCYTE [DISTWIDTH] IN BLOOD BY AUTOMATED COUNT: 12.3 % (ref 10–15)
GLUCOSE UR STRIP-MCNC: NEGATIVE MG/DL
HCT VFR BLD AUTO: 36.8 % (ref 35–47)
HGB BLD-MCNC: 12.5 G/DL (ref 11.7–15.7)
HGB UR QL STRIP: NEGATIVE
KETONES UR STRIP-MCNC: NEGATIVE MG/DL
LEUKOCYTE ESTERASE UR QL STRIP: NEGATIVE
MCH RBC QN AUTO: 28.9 PG (ref 26.5–33)
MCHC RBC AUTO-ENTMCNC: 34 G/DL (ref 31.5–36.5)
MCV RBC AUTO: 85 FL (ref 78–100)
NITRATE UR QL: NEGATIVE
PAP SMEAR - HIM PATIENT REPORTED: NORMAL
PH UR STRIP: 8 PH (ref 5–7)
PLATELET # BLD AUTO: 284 10E9/L (ref 150–450)
RBC # BLD AUTO: 4.32 10E12/L (ref 3.8–5.2)
SOURCE: ABNORMAL
SP GR UR STRIP: 1.01 (ref 1–1.03)
SPECIMEN SOURCE: ABNORMAL
UROBILINOGEN UR STRIP-ACNC: 0.2 EU/DL (ref 0.2–1)
WBC # BLD AUTO: 4.8 10E9/L (ref 4–11)
WET PREP SPEC: ABNORMAL

## 2019-05-17 PROCEDURE — 85027 COMPLETE CBC AUTOMATED: CPT | Performed by: PHYSICIAN ASSISTANT

## 2019-05-17 PROCEDURE — 87210 SMEAR WET MOUNT SALINE/INK: CPT | Performed by: PHYSICIAN ASSISTANT

## 2019-05-17 PROCEDURE — G0476 HPV COMBO ASSAY CA SCREEN: HCPCS | Performed by: PHYSICIAN ASSISTANT

## 2019-05-17 PROCEDURE — 36415 COLL VENOUS BLD VENIPUNCTURE: CPT | Performed by: PHYSICIAN ASSISTANT

## 2019-05-17 PROCEDURE — 87591 N.GONORRHOEAE DNA AMP PROB: CPT | Performed by: PHYSICIAN ASSISTANT

## 2019-05-17 PROCEDURE — 99214 OFFICE O/P EST MOD 30 MIN: CPT | Performed by: PHYSICIAN ASSISTANT

## 2019-05-17 PROCEDURE — 87491 CHLMYD TRACH DNA AMP PROBE: CPT | Performed by: PHYSICIAN ASSISTANT

## 2019-05-17 PROCEDURE — G0145 SCR C/V CYTO,THINLAYER,RESCR: HCPCS | Performed by: PHYSICIAN ASSISTANT

## 2019-05-17 PROCEDURE — 87086 URINE CULTURE/COLONY COUNT: CPT | Performed by: PHYSICIAN ASSISTANT

## 2019-05-17 PROCEDURE — 81003 URINALYSIS AUTO W/O SCOPE: CPT | Performed by: PHYSICIAN ASSISTANT

## 2019-05-17 RX ORDER — METRONIDAZOLE 7.5 MG/G
1 GEL VAGINAL DAILY
Qty: 70 G | Refills: 0 | Status: SHIPPED | OUTPATIENT
Start: 2019-05-17 | End: 2019-05-24

## 2019-05-17 ASSESSMENT — MIFFLIN-ST. JEOR: SCORE: 1877.09

## 2019-05-17 ASSESSMENT — PATIENT HEALTH QUESTIONNAIRE - PHQ9: SUM OF ALL RESPONSES TO PHQ QUESTIONS 1-9: 17

## 2019-05-17 ASSESSMENT — PAIN SCALES - GENERAL: PAINLEVEL: NO PAIN (0)

## 2019-05-17 NOTE — PATIENT INSTRUCTIONS
Metronidazole apply gel at bedtime for 7 nights    Make appointment with gynecologist that specializes in infertility    Please call [facility] at [phone] to schedule your appointment for ultrasound of pelvis    
- - -

## 2019-05-17 NOTE — PROGRESS NOTES
"  SUBJECTIVE:   Armida Quiñonez is a 33 year old female who presents to clinic today for the following   health issues:    Infertility    Duration: 1 year    Description (location/character/radiation): patient has been trying to have a child for 1 year. She has been having unprotected sex for 1 year. At last office visit she requested Nuva ring but never started it    Intensity:  n/a    Accompanying signs and symptoms: painful periods that cause nausea and vomiting  History (similar episodes/previous evaluation): patient has light periods but very painful for that last 3 years. Patient already has 3 kids from previous partner and her current  has 2 kids from previous marriage, but they can't conceive together.       Precipitating or alleviating factors: None    Therapies tried and outcome: None     Pelvic pain:  Onset 3 month ago  Cramping that comes and goes. Pain gets worse when she is menstruating, ovulating, or when bladder if full she feels pressure in pelvis. No dysuria, no hematuria,, no frequency. Patient has been having mild clear vaginal discharge with mild 'fishy\" smell for 1 month.    Additional history: as documented    Reviewed  and updated as needed this visit by clinical staff  Tobacco  Allergies  Meds  Problems  Med Hx  Surg Hx  Fam Hx  Soc Hx        Reviewed and updated as needed this visit by Provider  Tobacco  Allergies  Meds  Problems  Med Hx  Surg Hx  Fam Hx         Patient Active Problem List   Diagnosis     CARDIOVASCULAR SCREENING; LDL GOAL LESS THAN 160     Morbid obesity (H)     Mild persistent asthma, uncomplicated     Bipolar 2 disorder (H)     DEMETRI (generalized anxiety disorder)     Dyshidrotic eczema     Unspecified abnormalities of gait and mobility     Pain in joint, ankle and foot, right     Chronic pain of right knee     Past Surgical History:   Procedure Laterality Date     DILATION AND CURETTAGE SUCTION, TREAT INCOMPLETE         COLP CERVIX/UPPER " VAGINA W BX CERVIX      neg     HC INSERTION INTRAUTERINE DEVICE      Mirena     HC LAPAROSCOPY, SURGICAL; APPENDECTOMY  2012     HC REMOVE INTRAUTERINE DEVICE         Social History     Tobacco Use     Smoking status: Former Smoker     Packs/day: 0.50     Years: 3.00     Pack years: 1.50     Types: Cigarettes     Last attempt to quit: 2011     Years since quittin.0     Smokeless tobacco: Never Used   Substance Use Topics     Alcohol use: Yes     Alcohol/week: 0.0 oz     Comment: occassionally     Family History   Problem Relation Age of Onset     Cancer Mother      Cancer Maternal Grandmother      Unknown/Adopted No family hx of      Diabetes No family hx of      Hypertension No family hx of      Cerebrovascular Disease No family hx of      Thyroid Disease No family hx of      Glaucoma No family hx of      Macular Degeneration No family hx of          Current Outpatient Medications   Medication Sig Dispense Refill     metroNIDAZOLE (METROGEL) 0.75 % vaginal gel Place 1 applicator (5 g) vaginally daily for 7 days 70 g 0     albuterol (PROAIR HFA/PROVENTIL HFA/VENTOLIN HFA) 108 (90 BASE) MCG/ACT Inhaler Inhale 2 puffs into the lungs every 6 hours as needed for shortness of breath / dyspnea (Patient not taking: Reported on 2019) 1 Inhaler 11     etonogestrel-ethinyl estradiol (NUVARING) 0.12-0.015 MG/24HR vaginal ring Place 1 each vaginally every 28 days (Patient not taking: Reported on 2019) 3 each 3     ibuprofen (ADVIL/MOTRIN) 600 MG tablet Take 1 tablet (600 mg) by mouth every 8 hours as needed for moderate pain (Patient not taking: Reported on 10/22/2018) 30 tablet 0     Multiple Vitamins-Minerals (MULTIPLE VITAMIN  S/WOMENS) TABS Take 1 tablet by mouth daily (Patient not taking: Reported on 2019) 100 tablet 3     triamcinolone (KENALOG) 0.1 % cream Apply sparingly to rash on hands 2 - 3 times daily until rash is gone. (Patient not taking: Reported on 2019) 50 g 4  "    Allergies   Allergen Reactions     Latex        ROS:  Constitutional, HEENT, cardiovascular, pulmonary, gi and gu systems are negative, except as otherwise noted.    OBJECTIVE:     /68 (BP Location: Right arm, Patient Position: Sitting, Cuff Size: Adult Large)   Pulse 66   Temp 98.4  F (36.9  C) (Oral)   Resp 18   Ht 1.6 m (5' 3\")   Wt 119.3 kg (263 lb)   LMP 05/14/2019   SpO2 95%   Breastfeeding? No   BMI 46.59 kg/m    Body mass index is 46.59 kg/m .  GENERAL: healthy, alert and no distress  NECK: no adenopathy, no asymmetry, masses, or scars and thyroid normal to palpation  RESP: lungs clear to auscultation - no rales, rhonchi or wheezes  : normal external genitalia  CV: regular rate and rhythm, normal S1 S2, no S3 or S4, no murmur, click or rub, no peripheral edema and peripheral pulses strong  ABDOMEN: soft, nontender, no hepatosplenomegaly, no masses and bowel sounds normal  MS: no gross musculoskeletal defects noted, no edema    Diagnostic Test Results:  Pending     ASSESSMENT/PLAN:       ICD-10-CM    1. Pelvic cramping R10.2 UA reflex to Microscopic     US Pelvic Complete w Transvaginal     Urine Culture Aerobic Bacterial     NEISSERIA GONORRHOEA PCR     CHLAMYDIA TRACHOMATIS PCR     CBC with platelets   2. BV (bacterial vaginosis) N76.0 metroNIDAZOLE (METROGEL) 0.75 % vaginal gel    B96.89    3. Infertility counseling Z31.69 OB/GYN REFERRAL     Wet prep   4. Bipolar 2 disorder (H) F31.81    5. Screening for malignant neoplasm of cervix Z12.4 Pap imaged thin layer screen with HPV - recommended age 30 - 65 years (select HPV order below)     HPV High Risk Types DNA Cervical   1. From patient history it sounds   Please call [facility] at [phone] to schedule your appointment for ultrasound of pelvis  Labs are pending   2.Metronidazole apply gel at bedtime for 7 nights    3.Make appointment with gynecologist that specializes in infertility    4. Patient declined treatment or referral " to mental health at this time       Megan Nicholson PA-C  [facility]

## 2019-05-17 NOTE — LETTER
May 28, 2019    Armida Quiñonez  [address] [place] [address]    Dear ,  This letter is regarding your recent Pap smear (cervical cancer screening) and Human Papillomavirus (HPV) test.  We are happy to inform you that your Pap smear result is normal. Cervical cancer is closely linked with certain types of HPV. Your results showed no evidence of high-risk HPV.  Therefore we recommend you return in 5 years for your next pap smear and HPV test.  You will still need to return to the clinic every year for an annual exam and other preventive tests.  If you have additional questions regarding this result, please call our registered nurse, Ondina at [phone].  Sincerely,    Megan Nicholson PA-C/melissa

## 2019-05-19 LAB
BACTERIA SPEC CULT: NORMAL
C TRACH DNA SPEC QL NAA+PROBE: NEGATIVE
N GONORRHOEA DNA SPEC QL NAA+PROBE: NEGATIVE
SPECIMEN SOURCE: NORMAL

## 2019-05-21 ENCOUNTER — ANCILLARY PROCEDURE (OUTPATIENT)
Dept: ULTRASOUND IMAGING | Facility: CLINIC | Age: 34
End: 2019-05-21
Attending: PHYSICIAN ASSISTANT
Payer: COMMERCIAL

## 2019-05-21 ENCOUNTER — TELEPHONE (OUTPATIENT)
Dept: FAMILY MEDICINE | Facility: CLINIC | Age: 34
End: 2019-05-21

## 2019-05-21 DIAGNOSIS — R10.2 PELVIC CRAMPING: ICD-10-CM

## 2019-05-21 LAB
COPATH REPORT: NORMAL
PAP: NORMAL

## 2019-05-21 PROCEDURE — 76830 TRANSVAGINAL US NON-OB: CPT

## 2019-05-21 PROCEDURE — 76856 US EXAM PELVIC COMPLETE: CPT

## 2019-05-21 NOTE — TELEPHONE ENCOUNTER
Ultrasound showed small uterine fibroid which can cause cramping and pain and a small ovarian cyst.  Hopefully symptoms will pass in the next week or so.    Catherine Chauhan M.D.

## 2019-05-21 NOTE — TELEPHONE ENCOUNTER
Patient contacted and informed of the below per provider documentation. Patient verbalizes understanding. She will call back for the OBGYN referral number. She was not able to write down the number at this time.     Angela Price RN

## 2019-05-21 NOTE — TELEPHONE ENCOUNTER
Reason for Call:  Other Call back     Detailed comments: Armiad had an ultrasound this morning. Asking if she could please get a call back today as soon as the results are in. She is very stressed and would like to know something to ease her stree.  Thank you     Phone Number Patient can be reached at: Home number on file [phone] (home)    Best Time: Any    Can we leave a detailed message on this number? YES    Call taken on 5/21/2019 at 3:00 PM by Jericho Reynoso

## 2019-05-23 LAB
FINAL DIAGNOSIS: NORMAL
HPV HR 12 DNA CVX QL NAA+PROBE: NEGATIVE
HPV16 DNA SPEC QL NAA+PROBE: NEGATIVE
HPV18 DNA SPEC QL NAA+PROBE: NEGATIVE
SPECIMEN DESCRIPTION: NORMAL
SPECIMEN SOURCE CVX/VAG CYTO: NORMAL

## 2019-07-09 ENCOUNTER — OFFICE VISIT (OUTPATIENT)
Dept: OBGYN | Facility: CLINIC | Age: 34
End: 2019-07-09
Attending: PHYSICIAN ASSISTANT
Payer: COMMERCIAL

## 2019-07-09 VITALS
DIASTOLIC BLOOD PRESSURE: 80 MMHG | BODY MASS INDEX: 47.37 KG/M2 | WEIGHT: 267.4 LBS | HEART RATE: 93 BPM | OXYGEN SATURATION: 97 % | SYSTOLIC BLOOD PRESSURE: 121 MMHG

## 2019-07-09 DIAGNOSIS — N97.9 FEMALE INFERTILITY: Primary | ICD-10-CM

## 2019-07-09 LAB
ALBUMIN SERPL-MCNC: 3.2 G/DL (ref 3.4–5)
ALP SERPL-CCNC: 80 U/L (ref 40–150)
ALT SERPL W P-5'-P-CCNC: 17 U/L (ref 0–50)
ANION GAP SERPL CALCULATED.3IONS-SCNC: 7 MMOL/L (ref 3–14)
AST SERPL W P-5'-P-CCNC: 13 U/L (ref 0–45)
BILIRUB SERPL-MCNC: 0.3 MG/DL (ref 0.2–1.3)
BUN SERPL-MCNC: 6 MG/DL (ref 7–30)
CALCIUM SERPL-MCNC: 8.6 MG/DL (ref 8.5–10.1)
CHLORIDE SERPL-SCNC: 110 MMOL/L (ref 94–109)
CO2 SERPL-SCNC: 25 MMOL/L (ref 20–32)
CREAT SERPL-MCNC: 0.54 MG/DL (ref 0.52–1.04)
ERYTHROCYTE [DISTWIDTH] IN BLOOD BY AUTOMATED COUNT: 12.4 % (ref 10–15)
ESTRADIOL SERPL-MCNC: 55 PG/ML
FSH SERPL-ACNC: 6.7 IU/L
GFR SERPL CREATININE-BSD FRML MDRD: >90 ML/MIN/{1.73_M2}
GLUCOSE SERPL-MCNC: 96 MG/DL (ref 70–99)
HCT VFR BLD AUTO: 36.9 % (ref 35–47)
HGB BLD-MCNC: 12.3 G/DL (ref 11.7–15.7)
LH SERPL-ACNC: 6.4 IU/L
MCH RBC QN AUTO: 28.5 PG (ref 26.5–33)
MCHC RBC AUTO-ENTMCNC: 33.3 G/DL (ref 31.5–36.5)
MCV RBC AUTO: 86 FL (ref 78–100)
PLATELET # BLD AUTO: 263 10E9/L (ref 150–450)
POTASSIUM SERPL-SCNC: 3.8 MMOL/L (ref 3.4–5.3)
PROGEST SERPL-MCNC: <0.2 NG/ML
PROLACTIN SERPL-MCNC: 6 UG/L (ref 3–27)
PROT SERPL-MCNC: 7.2 G/DL (ref 6.8–8.8)
RBC # BLD AUTO: 4.31 10E12/L (ref 3.8–5.2)
SODIUM SERPL-SCNC: 142 MMOL/L (ref 133–144)
TSH SERPL DL<=0.005 MIU/L-ACNC: 0.6 MU/L (ref 0.4–4)
WBC # BLD AUTO: 4.9 10E9/L (ref 4–11)

## 2019-07-09 PROCEDURE — 84270 ASSAY OF SEX HORMONE GLOBUL: CPT | Performed by: OBSTETRICS & GYNECOLOGY

## 2019-07-09 PROCEDURE — 84403 ASSAY OF TOTAL TESTOSTERONE: CPT | Performed by: OBSTETRICS & GYNECOLOGY

## 2019-07-09 PROCEDURE — 84144 ASSAY OF PROGESTERONE: CPT | Performed by: OBSTETRICS & GYNECOLOGY

## 2019-07-09 PROCEDURE — 83001 ASSAY OF GONADOTROPIN (FSH): CPT | Performed by: OBSTETRICS & GYNECOLOGY

## 2019-07-09 PROCEDURE — 99214 OFFICE O/P EST MOD 30 MIN: CPT | Performed by: OBSTETRICS & GYNECOLOGY

## 2019-07-09 PROCEDURE — 80053 COMPREHEN METABOLIC PANEL: CPT | Performed by: OBSTETRICS & GYNECOLOGY

## 2019-07-09 PROCEDURE — 84146 ASSAY OF PROLACTIN: CPT | Performed by: OBSTETRICS & GYNECOLOGY

## 2019-07-09 PROCEDURE — 83002 ASSAY OF GONADOTROPIN (LH): CPT | Performed by: OBSTETRICS & GYNECOLOGY

## 2019-07-09 PROCEDURE — 36415 COLL VENOUS BLD VENIPUNCTURE: CPT | Performed by: OBSTETRICS & GYNECOLOGY

## 2019-07-09 PROCEDURE — 82627 DEHYDROEPIANDROSTERONE: CPT | Performed by: OBSTETRICS & GYNECOLOGY

## 2019-07-09 PROCEDURE — 82670 ASSAY OF TOTAL ESTRADIOL: CPT | Performed by: OBSTETRICS & GYNECOLOGY

## 2019-07-09 PROCEDURE — 84443 ASSAY THYROID STIM HORMONE: CPT | Performed by: OBSTETRICS & GYNECOLOGY

## 2019-07-09 PROCEDURE — 85027 COMPLETE CBC AUTOMATED: CPT | Performed by: OBSTETRICS & GYNECOLOGY

## 2019-07-09 NOTE — PATIENT INSTRUCTIONS
If you have any questions regarding your visit, Please contact your care team.  [facility]: [phone]  [facility] CLINIC HOURS TELEPHONE NUMBER   Cephas Agbeh, M.D. Lisa -       Jerri Grant         Monday-Jerry    8:00a.m-4:45 p.m    Tuesday--[place]     8:00a.m-4:45 p.m.    Thursday-Jerry    8:00a.m-4:45 p.m.    Friday-J[place]    8:00a.m-4:45 p.m    [facility]   [address] [place] [address]   [phone]-Ask for [facility]   Fax [phone]   [phone]-[phone]     [facility]   [address] [place] [address] [place] [address]   [phone]    [facility]  [address] [facility] [address]  [phone]  Jnvwroe-[phone]   Urgent Care locations:    [facility] Monday-Friday  5 pm - 9 pm  Saturday and Sunday   9 am - 5 pm   Monday-Friday   5 pm - 9 pm  Saturday and Sunday  9 am - 5 pm    [phone]   If you need a medication refill, please contact your pharmacy. Please allow 3 business days for your refill to be completed.  As always, Thank you for trusting us with your healthcare needs!

## 2019-07-09 NOTE — PROGRESS NOTES
Armida is a 34 year old  who presents with difficulty conceiving x 12 months.  Her menstrual periods are irregular.  + molima.  Her most recent form of birth control was , last used .  Previous infertility work up included: none.    Admits to coitus about once a week. Previous pregnancies by a different father.  Her partner is  36 year old who has fathered children previously.  He has  exposure to toxins, radiation or excessive heat.  He is in  health.    She is here with her friend.  OB History    Para Term  AB Living   5 3 0 0 2 3   SAB TAB Ectopic Multiple Live Births   1 0 0 0 3      # Outcome Date GA Lbr La/2nd Weight Sex Delivery Anes PTL Lv   5 SAB 08/14/15     SAB         Birth Comments: D&C   4 AB      TAB      3 Para 08    F Vag-Spont   INÉS   2 Para 07    M Vag-Spont   INÉS   1 Para 06    M Vag-Spont   INÉS       Gyne: Pap smears Normal      ROS: Ten point review of systems was reviewed and negative except the above.    PMH: Her past medical, surgical, and obstetric histories were reviewed and are documented in their appropriate chart areas.    ALL/Meds: Her medication and allergy histories were reviewed and are documented in their appropriate chart areas.    Soc Hx: - tob, - EtOH, single/Lives with partner.  FamHx:     PE: /80   Pulse 93   Wt 121.3 kg (267 lb 6.4 oz)   LMP 2019   SpO2 97%   Breastfeeding? No   BMI 47.37 kg/m    Gen: NAD  HEENT: NCAT  Abd: Soft, nontender, no masses.    Pelvic exam:diferred.  PELVIC ULTRASOUND WITH ENDOVAGINAL TRANSDUCER  2019 12:05 PM      HISTORY: Pelvic cramping.     TECHNIQUE:  Endovaginal sonography was added to the transabdominal  exam.     COMPARISON: None.     FINDINGS:   Endometrium: Endometrium is 6 mm thick.      Uterus: Measures 8.6 x 3.6 x 4.2 cm. There is a 1.1 x 1.4 x 1 cm low  left uterine subserosal fibroid.     Right ovary: Measures 2.7 x 1.9 x 3 cm. There is a 1.6 x 1.3 x 1.7 cm  simple  cyst.     Left ovary: Measures 1.8 x 1.1 x 1.8 cm. No focal lesions.     Additional findings: Trace pelvic fluid.                                                                      IMPRESSION:    1. Small low uterine fibroid.  2. Simple right ovarian cyst.     DAVID GAYLE MD  A/P     ICD-10-CM    1. Female infertility N97.9 Follicle stimulating hormone     Lutropin     TSH     Progesterone     Estradiol     Testosterone Free and Total     DHEA sulfate     Prolactin     Comprehensive metabolic panel     CBC with platelets     Progesterone     HCG quantitative pregnancy     Infertility: discussed male and female factors of infertility.  Discussed that work up included semen analysis, evaluation of ovulation, and evaluation of uterine/tubal anatomy.  Check semen analysis in the future,and hormone profile now.  If normal, then HSG to evaluate uterus.   Ultrasound is normal as above. Discussed possible use of clomid to improve ovulation as long as work up is normal.    In her situation I suspect that an increase in coitus frequency to at least 3 times weekly will improve fertility.    25 minutes was spent face to face with the patient today discussing her history, diagnosis, and follow-up plan as noted above.  Over 50% of the visit was spent in counseling and coordination of care.    Total Visit Time: 30 minutes.

## 2019-07-10 LAB — DHEA-S SERPL-MCNC: 87 UG/DL (ref 35–430)

## 2019-07-11 LAB
SHBG SERPL-SCNC: 55 NMOL/L (ref 30–135)
TESTOST FREE SERPL-MCNC: 0.31 NG/DL (ref 0.13–0.92)
TESTOST SERPL-MCNC: 25 NG/DL (ref 8–60)

## 2019-09-30 NOTE — PROGRESS NOTES
Discharge Note    Progress reporting period is from initial evaluation date Oct 29  to Dec 17, 2018.      Armida failed to follow up and current status is unknown.  Please see information below for last relevant information on current status.  Patient seen for 5 visits.    SUBJECTIVE  Subjective changes noted by patient:  Pt reports ankle pain has improved since last visit. Still has difficulty with standing/ambulation at work. Reports relative adherence to HEP. Pain improved with flexion stretches  .  Current pain level is 6/10.     Previous pain level was  8/10.   Changes in function:  Yes (See Goal flowsheet attached for changes in current functional level)  Adverse reaction to treatment or activity: None    OBJECTIVE  Changes noted in objective findings: Anterior ankle pain remains, improves with TC distraction. Lateral thigh and knee pain remains improved with flexion based program. Initiated strength today     ASSESSMENT/PLAN  Diagnosis: R knee   Updated problem list and treatment plan:   Pain - HEP  Decreased ROM/flexibility - HEP  Decreased strength - HEP  Edema - HEP  Impaired gait - HEP  Impaired balance - HEP  Decreased proprioception - HEP  Decreased joint mobility - HEP  Instability-HEP  STG/LTGs have been met or progress has been made towards goals:  Yes, please see goal flowsheet for most current information  Assessment of Progress: current status is unknown.    Last current status: Pt is progressing as expected   Self Management Plans:  HEP  I have re-evaluated this patient and find that the nature, scope, duration and intensity of the therapy is appropriate for the medical condition of the patient.  Armida continues to require the following intervention to meet STG and LTG's:  HEP.    Recommendations:  Discharge with current home program.  Patient to follow up with MD as needed.    Please refer to the daily flowsheet for treatment today, total treatment time and time spent performing 1:1 timed  codes.

## 2019-10-07 ENCOUNTER — COMMUNICATION - HEALTHEAST (OUTPATIENT)
Dept: SCHEDULING | Facility: CLINIC | Age: 34
End: 2019-10-07

## 2019-10-07 ENCOUNTER — OFFICE VISIT (OUTPATIENT)
Dept: FAMILY MEDICINE | Facility: CLINIC | Age: 34
End: 2019-10-07
Payer: COMMERCIAL

## 2019-10-07 VITALS
OXYGEN SATURATION: 100 % | HEIGHT: 65 IN | HEART RATE: 90 BPM | TEMPERATURE: 98.5 F | RESPIRATION RATE: 18 BRPM | WEIGHT: 274 LBS | SYSTOLIC BLOOD PRESSURE: 122 MMHG | DIASTOLIC BLOOD PRESSURE: 57 MMHG | BODY MASS INDEX: 45.65 KG/M2

## 2019-10-07 DIAGNOSIS — N91.2 AMENORRHEA: ICD-10-CM

## 2019-10-07 DIAGNOSIS — E66.01 MORBID OBESITY (H): ICD-10-CM

## 2019-10-07 DIAGNOSIS — Z32.01 PREGNANCY TEST POSITIVE: Primary | ICD-10-CM

## 2019-10-07 DIAGNOSIS — L21.9 SEBORRHEIC DERMATITIS: ICD-10-CM

## 2019-10-07 LAB — HCG UR QL: POSITIVE

## 2019-10-07 PROCEDURE — 81025 URINE PREGNANCY TEST: CPT | Performed by: FAMILY MEDICINE

## 2019-10-07 PROCEDURE — 99213 OFFICE O/P EST LOW 20 MIN: CPT | Performed by: FAMILY MEDICINE

## 2019-10-07 RX ORDER — PRENATAL VIT/IRON FUM/FOLIC AC 27MG-0.8MG
1 TABLET ORAL DAILY
Qty: 90 TABLET | Refills: 3 | Status: SHIPPED | OUTPATIENT
Start: 2019-10-07 | End: 2022-03-04

## 2019-10-07 RX ORDER — BETAMETHASONE DIPROPIONATE 0.5 MG/G
CREAM TOPICAL
Refills: 6 | COMMUNITY
Start: 2019-03-28 | End: 2023-06-12

## 2019-10-07 RX ORDER — KETOCONAZOLE 20 MG/ML
SHAMPOO TOPICAL
COMMUNITY
Start: 2018-12-15 | End: 2021-11-24

## 2019-10-07 RX ORDER — BENZOCAINE/MENTHOL 6 MG-10 MG
LOZENGE MUCOUS MEMBRANE
COMMUNITY
Start: 2019-08-22 | End: 2019-10-07

## 2019-10-07 RX ORDER — ONDANSETRON 4 MG/1
4 TABLET, ORALLY DISINTEGRATING ORAL
COMMUNITY
Start: 2019-07-04 | End: 2022-03-04

## 2019-10-07 RX ORDER — BENZOCAINE/MENTHOL 6 MG-10 MG
LOZENGE MUCOUS MEMBRANE 3 TIMES DAILY
Qty: 60 G | Refills: 1 | Status: SHIPPED | OUTPATIENT
Start: 2019-10-07 | End: 2023-06-12

## 2019-10-07 ASSESSMENT — PAIN SCALES - GENERAL: PAINLEVEL: NO PAIN (0)

## 2019-10-07 ASSESSMENT — MIFFLIN-ST. JEOR: SCORE: 1949.77

## 2019-10-07 NOTE — PROGRESS NOTES
"Subjective     Armida Quiñonez is a 34 year old female who presents to clinic today for the following health issues:    HPI   Pregnancy test Confirmation   Would like to talk about here weight and getting medication  Cream refill - works well for skin issues      Reviewed and updated as needed this visit by Provider  Tobacco  Allergies  Meds  Problems  Med Hx  Surg Hx  Fam Hx         Review of Systems   ROS COMP: Constitutional, HEENT, cardiovascular, pulmonary, gi and gu systems are negative, except as otherwise noted.      Objective    /57 (BP Location: Left arm, Patient Position: Chair, Cuff Size: Adult Large)   Pulse 90   Temp 98.5  F (36.9  C) (Oral)   Resp 18   Ht 1.645 m (5' 4.75\")   Wt 124.3 kg (274 lb)   LMP 09/01/2019 (Approximate)   SpO2 100%   Breastfeeding? No   BMI 45.95 kg/m    Body mass index is 45.95 kg/m .  Physical Exam   GENERAL: healthy, alert, no distress and obese  NECK: no adenopathy, no asymmetry, masses, or scars and thyroid normal to palpation  RESP: lungs clear to auscultation - no rales, rhonchi or wheezes  CV: regular rate and rhythm, normal S1 S2, no S3 or S4, no murmur, click or rub, no peripheral edema and peripheral pulses strong  ABDOMEN: soft, nontender, no hepatosplenomegaly, no masses and bowel sounds normal  MS: no gross musculoskeletal defects noted, no edema    Diagnostic Test Results:  Labs reviewed in Epic  Results for orders placed or performed in visit on 10/07/19 (from the past 24 hour(s))   HCG Qual, Urine ([id number])   Result Value Ref Range    HCG Qual Urine Positive (A) NEG^Negative           Assessment & Plan     1. Pregnancy test positive  Refilled and patient will try to see OB/GYN in [facility] since now living in [facility]  - Prenatal Vit-Fe Fumarate-FA (PRENATAL MULTIVITAMIN W/IRON) 27-0.8 MG tablet; Take 1 tablet by mouth daily  Dispense: 90 tablet; Refill: 3    2. Seborrheic dermatitis  Refilled  - hydrocortisone (CORTAID) 1 % external " "cream; Apply topically 3 times daily  Dispense: 60 g; Refill: 1    3. Morbid obesity (H)  Low carb higher natural fat diet recommended    4. Amenorrhea    - HCG Qual, Urine ([id number])     BMI:   Estimated body mass index is 45.95 kg/m  as calculated from the following:    Height as of this encounter: 1.645 m (5' 4.75\").    Weight as of this encounter: 124.3 kg (274 lb).   Weight management plan: Discussed healthy diet and exercise guidelines    The uses and side effects, including black box warnings as appropriate, were discussed in detail.  All patient questions were answered.  The patient was instructed to call immediately if any side effects developed.     Return in about 2 weeks (around 10/21/2019) for obgyn.    Catherine Seo MD  [facility]          "

## 2019-10-07 NOTE — PATIENT INSTRUCTIONS
At [facility], we strive to deliver an exceptional experience to you, every time we see you.  If you receive a survey in the mail, please send us back your thoughts. We really do value your feedback.    Your care team:                            Family Medicine Internal Medicine   MD Francisco Javier Abreu MD Shantel Branch-Fleming, MD Katya Georgiev PA-C Megan Hill, APRN JANE Bah MD Pediatrics   Otis Campbell, JACKLYN Stevenson, MD Afia Lowe APRN CNP   MD Toshia Turcios MD Deborah Mielke, MD Darline Colvin, APRN [facility]      Clinic hours: Monday - Thursday 7 am-7 pm; Fridays 7 am-5 pm.   Urgent care: Monday - Friday 11 am-9 pm; Saturday and Sunday 9 am-5 pm.  Pharmacy : Monday -Thursday 8 am-8 pm; Friday 8 am-6 pm; Saturday and Sunday 9 am-5 pm.     Clinic: [phone]   Pharmacy: [phone]

## 2019-10-08 ASSESSMENT — ASTHMA QUESTIONNAIRES: ACT_TOTALSCORE: 20

## 2019-10-15 ENCOUNTER — OFFICE VISIT - HEALTHEAST (OUTPATIENT)
Dept: FAMILY MEDICINE | Facility: CLINIC | Age: 34
End: 2019-10-15

## 2019-10-15 ENCOUNTER — HOSPITAL ENCOUNTER (OUTPATIENT)
Dept: ULTRASOUND IMAGING | Facility: CLINIC | Age: 34
Discharge: HOME | End: 2019-10-15
Attending: FAMILY MEDICINE

## 2019-10-15 DIAGNOSIS — Z32.00 PREGNANCY EXAMINATION OR TEST, PREGNANCY UNCONFIRMED: ICD-10-CM

## 2019-10-15 DIAGNOSIS — F33.0 MILD RECURRENT MAJOR DEPRESSION (H): ICD-10-CM

## 2019-10-15 PROBLEM — M25.571 PAIN IN JOINT, ANKLE AND FOOT, RIGHT: Status: RESOLVED | Noted: 2018-10-29 | Resolved: 2019-10-15

## 2019-10-15 PROBLEM — M25.561 CHRONIC PAIN OF RIGHT KNEE: Status: RESOLVED | Noted: 2018-10-29 | Resolved: 2019-10-15

## 2019-10-15 PROBLEM — R26.9 UNSPECIFIED ABNORMALITIES OF GAIT AND MOBILITY: Status: RESOLVED | Noted: 2018-10-29 | Resolved: 2019-10-15

## 2019-10-15 PROBLEM — G89.29 CHRONIC PAIN OF RIGHT KNEE: Status: RESOLVED | Noted: 2018-10-29 | Resolved: 2019-10-15

## 2019-10-15 LAB
ALBUMIN UR-MCNC: ABNORMAL MG/DL
AMORPH CRY #/AREA URNS HPF: ABNORMAL /[HPF]
APPEARANCE UR: CLEAR
BACTERIA #/AREA URNS HPF: ABNORMAL HPF
BASOPHILS # BLD AUTO: 0 THOU/UL (ref 0–0.2)
BASOPHILS NFR BLD AUTO: 0 % (ref 0–2)
BILIRUB UR QL STRIP: ABNORMAL
COLOR UR AUTO: YELLOW
EOSINOPHIL # BLD AUTO: 0.1 THOU/UL (ref 0–0.4)
EOSINOPHIL NFR BLD AUTO: 1 % (ref 0–6)
ERYTHROCYTE [DISTWIDTH] IN BLOOD BY AUTOMATED COUNT: 12.3 % (ref 11–14.5)
GLUCOSE UR STRIP-MCNC: NEGATIVE MG/DL
HCG SERPL-ACNC: ABNORMAL MLU/ML (ref 0–4)
HCG UR QL: POSITIVE
HCT VFR BLD AUTO: 36.8 % (ref 35–47)
HGB BLD-MCNC: 12.2 G/DL (ref 12–16)
HGB UR QL STRIP: NEGATIVE
KETONES UR STRIP-MCNC: ABNORMAL MG/DL
LEUKOCYTE ESTERASE UR QL STRIP: NEGATIVE
LYMPHOCYTES # BLD AUTO: 2.3 THOU/UL (ref 0.8–4.4)
LYMPHOCYTES NFR BLD AUTO: 41 % (ref 20–40)
MCH RBC QN AUTO: 28.6 PG (ref 27–34)
MCHC RBC AUTO-ENTMCNC: 33.1 G/DL (ref 32–36)
MCV RBC AUTO: 87 FL (ref 80–100)
MONOCYTES # BLD AUTO: 0.4 THOU/UL (ref 0–0.9)
MONOCYTES NFR BLD AUTO: 7 % (ref 2–10)
NEUTROPHILS # BLD AUTO: 2.9 THOU/UL (ref 2–7.7)
NEUTROPHILS NFR BLD AUTO: 51 % (ref 50–70)
NITRATE UR QL: NEGATIVE
PH UR STRIP: 6 [PH] (ref 5–8)
PLATELET # BLD AUTO: 305 THOU/UL (ref 140–440)
PMV BLD AUTO: 7.4 FL (ref 7–10)
RBC # BLD AUTO: 4.25 MILL/UL (ref 3.8–5.4)
RBC #/AREA URNS AUTO: ABNORMAL HPF
SP GR UR STRIP: 1.02 (ref 1–1.03)
SQUAMOUS #/AREA URNS AUTO: ABNORMAL LPF
TSH SERPL DL<=0.005 MIU/L-ACNC: 1.36 UIU/ML (ref 0.3–5)
UROBILINOGEN UR STRIP-ACNC: ABNORMAL
WBC #/AREA URNS AUTO: ABNORMAL HPF
WBC: 5.6 THOU/UL (ref 4–11)

## 2019-10-15 ASSESSMENT — MIFFLIN-ST. JEOR: SCORE: 1919.25

## 2019-10-16 ENCOUNTER — COMMUNICATION - HEALTHEAST (OUTPATIENT)
Dept: FAMILY MEDICINE | Facility: CLINIC | Age: 34
End: 2019-10-16

## 2019-10-16 ENCOUNTER — AMBULATORY - HEALTHEAST (OUTPATIENT)
Dept: FAMILY MEDICINE | Facility: CLINIC | Age: 34
End: 2019-10-16

## 2019-10-16 DIAGNOSIS — Z32.00 PREGNANCY EXAMINATION OR TEST, PREGNANCY UNCONFIRMED: ICD-10-CM

## 2019-10-17 ENCOUNTER — COMMUNICATION - HEALTHEAST (OUTPATIENT)
Dept: FAMILY MEDICINE | Facility: CLINIC | Age: 34
End: 2019-10-17

## 2019-10-17 ASSESSMENT — ANXIETY QUESTIONNAIRES
1. FEELING NERVOUS, ANXIOUS, OR ON EDGE: NEARLY EVERY DAY
2. NOT BEING ABLE TO STOP OR CONTROL WORRYING: NEARLY EVERY DAY
GAD7 TOTAL SCORE: 19
3. WORRYING TOO MUCH ABOUT DIFFERENT THINGS: NEARLY EVERY DAY
5. BEING SO RESTLESS THAT IT IS HARD TO SIT STILL: SEVERAL DAYS
6. BECOMING EASILY ANNOYED OR IRRITABLE: NEARLY EVERY DAY
7. FEELING AFRAID AS IF SOMETHING AWFUL MIGHT HAPPEN: NEARLY EVERY DAY
4. TROUBLE RELAXING: NEARLY EVERY DAY
IF YOU CHECKED OFF ANY PROBLEMS ON THIS QUESTIONNAIRE, HOW DIFFICULT HAVE THESE PROBLEMS MADE IT FOR YOU TO DO YOUR WORK, TAKE CARE OF THINGS AT HOME, OR GET ALONG WITH OTHER PEOPLE: EXTREMELY DIFFICULT

## 2019-10-17 ASSESSMENT — PATIENT HEALTH QUESTIONNAIRE - PHQ9: SUM OF ALL RESPONSES TO PHQ QUESTIONS 1-9: 16

## 2019-10-21 ENCOUNTER — COMMUNICATION - HEALTHEAST (OUTPATIENT)
Dept: FAMILY MEDICINE | Facility: CLINIC | Age: 34
End: 2019-10-21

## 2019-10-21 ENCOUNTER — AMBULATORY - HEALTHEAST (OUTPATIENT)
Dept: LAB | Facility: CLINIC | Age: 34
End: 2019-10-21

## 2019-10-21 ENCOUNTER — COMMUNICATION - HEALTHEAST (OUTPATIENT)
Dept: TELEHEALTH | Facility: CLINIC | Age: 34
End: 2019-10-21

## 2019-10-21 DIAGNOSIS — Z36.87 UNSURE OF LMP (LAST MENSTRUAL PERIOD) AS REASON FOR ULTRASOUND SCAN: ICD-10-CM

## 2019-10-21 DIAGNOSIS — Z32.01 PREGNANCY TEST POSITIVE: ICD-10-CM

## 2019-10-21 DIAGNOSIS — Z32.00 PREGNANCY EXAMINATION OR TEST, PREGNANCY UNCONFIRMED: ICD-10-CM

## 2019-10-21 LAB — HCG SERPL-ACNC: ABNORMAL MLU/ML (ref 0–4)

## 2019-11-01 ENCOUNTER — HOSPITAL ENCOUNTER (OUTPATIENT)
Dept: ULTRASOUND IMAGING | Facility: CLINIC | Age: 34
Discharge: HOME | End: 2019-11-01
Attending: FAMILY MEDICINE

## 2019-11-01 ENCOUNTER — AMBULATORY - HEALTHEAST (OUTPATIENT)
Dept: FAMILY MEDICINE | Facility: CLINIC | Age: 34
End: 2019-11-01

## 2019-11-01 DIAGNOSIS — Z32.01 PREGNANCY TEST POSITIVE: ICD-10-CM

## 2019-11-01 DIAGNOSIS — O03.9 MISCARRIAGE: ICD-10-CM

## 2019-11-01 DIAGNOSIS — Z36.87 UNSURE OF LMP (LAST MENSTRUAL PERIOD) AS REASON FOR ULTRASOUND SCAN: ICD-10-CM

## 2019-11-04 ENCOUNTER — RECORDS - HEALTHEAST (OUTPATIENT)
Dept: ADMINISTRATIVE | Facility: OTHER | Age: 34
End: 2019-11-04

## 2019-11-04 ENCOUNTER — COMMUNICATION - HEALTHEAST (OUTPATIENT)
Dept: SCHEDULING | Facility: CLINIC | Age: 34
End: 2019-11-04

## 2019-11-05 ENCOUNTER — RECORDS - HEALTHEAST (OUTPATIENT)
Dept: ADMINISTRATIVE | Facility: OTHER | Age: 34
End: 2019-11-05

## 2019-12-17 ENCOUNTER — RECORDS - HEALTHEAST (OUTPATIENT)
Dept: ADMINISTRATIVE | Facility: OTHER | Age: 34
End: 2019-12-17

## 2019-12-18 ENCOUNTER — RECORDS - HEALTHEAST (OUTPATIENT)
Dept: ADMINISTRATIVE | Facility: OTHER | Age: 34
End: 2019-12-18

## 2020-01-08 ENCOUNTER — OFFICE VISIT - HEALTHEAST (OUTPATIENT)
Dept: FAMILY MEDICINE | Facility: CLINIC | Age: 35
End: 2020-01-08

## 2020-01-08 DIAGNOSIS — D62 ANEMIA ASSOCIATED WITH ACUTE BLOOD LOSS: ICD-10-CM

## 2020-01-08 DIAGNOSIS — L21.9 SEBORRHEIC DERMATITIS OF SCALP: ICD-10-CM

## 2020-01-08 DIAGNOSIS — L30.1 DYSHIDROTIC ECZEMA: ICD-10-CM

## 2020-01-08 LAB
ALBUMIN SERPL-MCNC: 3.6 G/DL (ref 3.5–5)
ALP SERPL-CCNC: 93 U/L (ref 45–120)
ALT SERPL W P-5'-P-CCNC: 13 U/L (ref 0–45)
ANION GAP SERPL CALCULATED.3IONS-SCNC: 10 MMOL/L (ref 5–18)
AST SERPL W P-5'-P-CCNC: 15 U/L (ref 0–40)
BASOPHILS # BLD AUTO: 0 THOU/UL (ref 0–0.2)
BASOPHILS NFR BLD AUTO: 1 % (ref 0–2)
BILIRUB SERPL-MCNC: 0.8 MG/DL (ref 0–1)
BUN SERPL-MCNC: 10 MG/DL (ref 8–22)
CALCIUM SERPL-MCNC: 9.3 MG/DL (ref 8.5–10.5)
CHLORIDE BLD-SCNC: 104 MMOL/L (ref 98–107)
CO2 SERPL-SCNC: 25 MMOL/L (ref 22–31)
CREAT SERPL-MCNC: 0.67 MG/DL (ref 0.6–1.1)
EOSINOPHIL # BLD AUTO: 0.1 THOU/UL (ref 0–0.4)
EOSINOPHIL NFR BLD AUTO: 1 % (ref 0–6)
ERYTHROCYTE [DISTWIDTH] IN BLOOD BY AUTOMATED COUNT: 12.2 % (ref 11–14.5)
FERRITIN SERPL-MCNC: 9 NG/ML (ref 10–130)
GFR SERPL CREATININE-BSD FRML MDRD: >60 ML/MIN/1.73M2
GLUCOSE BLD-MCNC: 97 MG/DL (ref 70–125)
HCT VFR BLD AUTO: 36.1 % (ref 35–47)
HGB BLD-MCNC: 11.4 G/DL (ref 12–16)
IRON SATN MFR SERPL: 16 % (ref 20–50)
IRON SERPL-MCNC: 60 UG/DL (ref 42–175)
LYMPHOCYTES # BLD AUTO: 2.9 THOU/UL (ref 0.8–4.4)
LYMPHOCYTES NFR BLD AUTO: 56 % (ref 20–40)
MCH RBC QN AUTO: 27.4 PG (ref 27–34)
MCHC RBC AUTO-ENTMCNC: 31.6 G/DL (ref 32–36)
MCV RBC AUTO: 87 FL (ref 80–100)
MONOCYTES # BLD AUTO: 0.4 THOU/UL (ref 0–0.9)
MONOCYTES NFR BLD AUTO: 8 % (ref 2–10)
NEUTROPHILS # BLD AUTO: 1.8 THOU/UL (ref 2–7.7)
NEUTROPHILS NFR BLD AUTO: 35 % (ref 50–70)
PLATELET # BLD AUTO: 282 THOU/UL (ref 140–440)
PMV BLD AUTO: 7.4 FL (ref 7–10)
POTASSIUM BLD-SCNC: 4.6 MMOL/L (ref 3.5–5)
PROT SERPL-MCNC: 7.3 G/DL (ref 6–8)
RBC # BLD AUTO: 4.17 MILL/UL (ref 3.8–5.4)
SODIUM SERPL-SCNC: 139 MMOL/L (ref 136–145)
TIBC SERPL-MCNC: 380 UG/DL (ref 313–563)
TRANSFERRIN SERPL-MCNC: 304 MG/DL (ref 212–360)
WBC: 5.2 THOU/UL (ref 4–11)

## 2020-01-10 ENCOUNTER — COMMUNICATION - HEALTHEAST (OUTPATIENT)
Dept: FAMILY MEDICINE | Facility: CLINIC | Age: 35
End: 2020-01-10

## 2020-02-10 ENCOUNTER — OFFICE VISIT (OUTPATIENT)
Dept: URGENT CARE | Facility: URGENT CARE | Age: 35
End: 2020-02-10
Payer: COMMERCIAL

## 2020-02-10 ENCOUNTER — OFFICE VISIT - HEALTHEAST (OUTPATIENT)
Dept: BEHAVIORAL HEALTH | Facility: CLINIC | Age: 35
End: 2020-02-10

## 2020-02-10 VITALS
DIASTOLIC BLOOD PRESSURE: 64 MMHG | SYSTOLIC BLOOD PRESSURE: 108 MMHG | WEIGHT: 279 LBS | BODY MASS INDEX: 46.79 KG/M2 | TEMPERATURE: 98.5 F | OXYGEN SATURATION: 99 % | HEART RATE: 98 BPM

## 2020-02-10 DIAGNOSIS — N89.8 VAGINAL DISCHARGE: ICD-10-CM

## 2020-02-10 DIAGNOSIS — R35.0 URINARY FREQUENCY: ICD-10-CM

## 2020-02-10 DIAGNOSIS — N76.0 BV (BACTERIAL VAGINOSIS): Primary | ICD-10-CM

## 2020-02-10 DIAGNOSIS — B96.89 BV (BACTERIAL VAGINOSIS): Primary | ICD-10-CM

## 2020-02-10 DIAGNOSIS — R11.0 NAUSEA: ICD-10-CM

## 2020-02-10 DIAGNOSIS — F33.2 MAJOR DEPRESSIVE DISORDER, RECURRENT EPISODE, SEVERE WITH ANXIOUS DISTRESS (H): ICD-10-CM

## 2020-02-10 LAB
ALBUMIN UR-MCNC: NEGATIVE MG/DL
APPEARANCE UR: CLEAR
BILIRUB UR QL STRIP: NEGATIVE
COLOR UR AUTO: YELLOW
GLUCOSE UR STRIP-MCNC: NEGATIVE MG/DL
HCG UR QL: NEGATIVE
HGB UR QL STRIP: NEGATIVE
KETONES UR STRIP-MCNC: NEGATIVE MG/DL
LEUKOCYTE ESTERASE UR QL STRIP: NEGATIVE
NITRATE UR QL: NEGATIVE
PH UR STRIP: 7 PH (ref 5–7)
SOURCE: NORMAL
SP GR UR STRIP: 1.02 (ref 1–1.03)
SPECIMEN SOURCE: ABNORMAL
UROBILINOGEN UR STRIP-ACNC: 1 EU/DL (ref 0.2–1)
WET PREP SPEC: ABNORMAL

## 2020-02-10 PROCEDURE — 99213 OFFICE O/P EST LOW 20 MIN: CPT | Performed by: PHYSICIAN ASSISTANT

## 2020-02-10 PROCEDURE — 81003 URINALYSIS AUTO W/O SCOPE: CPT | Performed by: PHYSICIAN ASSISTANT

## 2020-02-10 PROCEDURE — 87210 SMEAR WET MOUNT SALINE/INK: CPT | Performed by: PHYSICIAN ASSISTANT

## 2020-02-10 PROCEDURE — 81025 URINE PREGNANCY TEST: CPT | Performed by: PHYSICIAN ASSISTANT

## 2020-02-10 RX ORDER — METRONIDAZOLE 7.5 MG/G
1 GEL VAGINAL DAILY
Qty: 35 G | Refills: 0 | Status: SHIPPED | OUTPATIENT
Start: 2020-02-10 | End: 2020-02-17

## 2020-02-10 ASSESSMENT — ANXIETY QUESTIONNAIRES
5. BEING SO RESTLESS THAT IT IS HARD TO SIT STILL: NEARLY EVERY DAY
6. BECOMING EASILY ANNOYED OR IRRITABLE: NEARLY EVERY DAY
1. FEELING NERVOUS, ANXIOUS, OR ON EDGE: NEARLY EVERY DAY
7. FEELING AFRAID AS IF SOMETHING AWFUL MIGHT HAPPEN: NEARLY EVERY DAY
3. WORRYING TOO MUCH ABOUT DIFFERENT THINGS: NEARLY EVERY DAY
IF YOU CHECKED OFF ANY PROBLEMS ON THIS QUESTIONNAIRE, HOW DIFFICULT HAVE THESE PROBLEMS MADE IT FOR YOU TO DO YOUR WORK, TAKE CARE OF THINGS AT HOME, OR GET ALONG WITH OTHER PEOPLE: VERY DIFFICULT
2. NOT BEING ABLE TO STOP OR CONTROL WORRYING: NEARLY EVERY DAY
GAD7 TOTAL SCORE: 21
4. TROUBLE RELAXING: NEARLY EVERY DAY

## 2020-02-10 ASSESSMENT — PATIENT HEALTH QUESTIONNAIRE - PHQ9: SUM OF ALL RESPONSES TO PHQ QUESTIONS 1-9: 22

## 2020-02-11 NOTE — PATIENT INSTRUCTIONS

## 2020-02-16 NOTE — PROGRESS NOTES
SUBJECTIVE:   Armida Quiñonez is a 34 year old female who  presents today for a possible UTI. Symptoms of dysuria and frequency have been going on for 1week(s).  Hematuria no.  sudden onset and still presentand mild.  There is no history of fever, chills, nausea or vomiting.  No history of vaginal or penile discharge. This patient does  have a history of urinary tract infections. 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55)     Chlamydia 2011     Depression with anxiety 2000     Gonorrhea 2012     H/O colposcopy with cervical biopsy 5/17/12    WNL     Intermittent asthma      Current Outpatient Medications   Medication Sig Dispense Refill     augmented betamethasone dipropionate (DIPROLENE-AF) 0.05 % external cream NICK BID TO HANDS AND ARMS ONLY PRN  6     hydrocortisone (CORTAID) 1 % external cream Apply topically 3 times daily 60 g 1     ketoconazole (NIZORAL) 2 % external shampoo Use once daily for 2 weeks       metroNIDAZOLE (METROGEL) 0.75 % vaginal gel Place 1 applicator (5 g) vaginally daily for 7 days 35 g 0     albuterol (PROAIR HFA/PROVENTIL HFA/VENTOLIN HFA) 108 (90 BASE) MCG/ACT Inhaler Inhale 2 puffs into the lungs every 6 hours as needed for shortness of breath / dyspnea (Patient not taking: Reported on 2/10/2020) 1 Inhaler 11     ondansetron (ZOFRAN-ODT) 4 MG ODT tab Place 4 mg under the tongue       Prenatal Vit-Fe Fumarate-FA (PRENATAL MULTIVITAMIN W/IRON) 27-0.8 MG tablet Take 1 tablet by mouth daily (Patient not taking: Reported on 2/10/2020) 90 tablet 3     Social History     Tobacco Use     Smoking status: Current Some Day Smoker     Packs/day: 0.50     Years: 3.00     Pack years: 1.50     Types: Cigarettes     Last attempt to  quit: 2011     Years since quittin.7     Smokeless tobacco: Never Used     Tobacco comment: 7 cigs a week   Substance Use Topics     Alcohol use: Yes     Alcohol/week: 0.0 standard drinks     Comment: occassionally       ROS:   10 point ROS negative except as listed above      OBJECTIVE:  /64 (Cuff Size: Adult Large)   Pulse 98   Temp 98.5  F (36.9  C) (Oral)   Wt 126.6 kg (279 lb)   SpO2 99%   BMI 46.79 kg/m    GENERAL APPEARANCE: healthy, alert and no distress  RESP: lungs clear to auscultation - no rales, rhonchi or wheezes  CV: regular rates and rhythm, normal S1 S2, no murmur noted  ABDOMEN:  soft, nontender, no HSM or masses and bowel sounds normal  BACK: No CVA tenderness  SKIN: no suspicious lesions or rashes      Results for orders placed or performed in visit on 02/10/20   *UA reflex to Microscopic and Culture ([place] and [facility] (except [place] and [place])     Status: None   Result Value Ref Range    Color Urine Yellow     Appearance Urine Clear     Glucose Urine Negative NEG^Negative mg/dL    Bilirubin Urine Negative NEG^Negative    Ketones Urine Negative NEG^Negative mg/dL    Specific Gravity Urine 1.025 1.003 - 1.035    Blood Urine Negative NEG^Negative    pH Urine 7.0 5.0 - 7.0 pH    Protein Albumin Urine Negative NEG^Negative mg/dL    Urobilinogen Urine 1.0 0.2 - 1.0 EU/dL    Nitrite Urine Negative NEG^Negative    Leukocyte Esterase Urine Negative NEG^Negative    Source Midstream Urine    HCG Qual, Urine ([id number])     Status: None   Result Value Ref Range    HCG Qual Urine Negative NEG^Negative   Wet prep     Status: Abnormal   Result Value Ref Range    Specimen Description Vagina     Wet Prep No Trichomonas seen     Wet Prep No yeast seen     Wet Prep Moderate  Clue cells seen   (A)     Wet Prep Moderate  WBC'S seen          ASSESSMENT:   (N76.0,  B96.89) BV (bacterial vaginosis)  (primary encounter diagnosis)  Plan: metroNIDAZOLE (METROGEL) 0.75 % vaginal  gel      (R35.0) Urinary frequency  Plan: *UA reflex to Microscopic and Culture ([place]         and [facility] (except [place] and         [place])      (N89.8) Vaginal discharge  Plan: Wet prep          (R11.0) Nausea  Plan: HCG Qual, Urine ([id number])       Follow up with PCP if symptoms worsen or fail to improve

## 2020-03-01 ENCOUNTER — HEALTH MAINTENANCE LETTER (OUTPATIENT)
Age: 35
End: 2020-03-01

## 2020-03-04 ENCOUNTER — AMBULATORY - HEALTHEAST (OUTPATIENT)
Dept: BEHAVIORAL HEALTH | Facility: CLINIC | Age: 35
End: 2020-03-04

## 2020-03-04 ENCOUNTER — OFFICE VISIT - HEALTHEAST (OUTPATIENT)
Dept: BEHAVIORAL HEALTH | Facility: CLINIC | Age: 35
End: 2020-03-04

## 2020-03-04 DIAGNOSIS — F33.2 MAJOR DEPRESSIVE DISORDER, RECURRENT EPISODE, SEVERE WITH ANXIOUS DISTRESS (H): ICD-10-CM

## 2020-03-10 ENCOUNTER — AMBULATORY - HEALTHEAST (OUTPATIENT)
Dept: BEHAVIORAL HEALTH | Facility: CLINIC | Age: 35
End: 2020-03-10

## 2020-03-23 ENCOUNTER — AMBULATORY - HEALTHEAST (OUTPATIENT)
Dept: BEHAVIORAL HEALTH | Facility: CLINIC | Age: 35
End: 2020-03-23

## 2020-03-25 ENCOUNTER — AMBULATORY - HEALTHEAST (OUTPATIENT)
Dept: BEHAVIORAL HEALTH | Facility: CLINIC | Age: 35
End: 2020-03-25

## 2020-07-28 ENCOUNTER — COMMUNICATION - HEALTHEAST (OUTPATIENT)
Dept: FAMILY MEDICINE | Facility: CLINIC | Age: 35
End: 2020-07-28

## 2020-12-14 ENCOUNTER — HEALTH MAINTENANCE LETTER (OUTPATIENT)
Age: 35
End: 2020-12-14

## 2021-01-17 ENCOUNTER — RECORDS - HEALTHEAST (OUTPATIENT)
Dept: ADMINISTRATIVE | Facility: OTHER | Age: 36
End: 2021-01-17

## 2021-01-18 ENCOUNTER — COMMUNICATION - HEALTHEAST (OUTPATIENT)
Dept: CARE COORDINATION | Facility: CLINIC | Age: 36
End: 2021-01-18

## 2021-01-20 ENCOUNTER — VIRTUAL VISIT (OUTPATIENT)
Dept: FAMILY MEDICINE | Facility: CLINIC | Age: 36
End: 2021-01-20
Payer: COMMERCIAL

## 2021-01-20 DIAGNOSIS — D25.9 UTERINE LEIOMYOMA, UNSPECIFIED LOCATION: Primary | ICD-10-CM

## 2021-01-20 NOTE — PROGRESS NOTES
Armida is a 35 year old who is being evaluated via a billable telephone visit.      What phone number would you like to be contacted at? demo  How would you like to obtain your AVS? Zoey  Patient was at [facility] for in person visit and thought this was just a conversation not a visit.  We decided she would keep the [facility] visit and cancel this one.    Subjective     Armida is a 35 year old who presents to clinic today for the following health issues     HPI             Review of Systems         Objective           Vitals:  No vitals were obtained today due to virtual visit.    Physical Exam     PSYCH: Alert and oriented times 3; coherent speech, normal   rate and volume, able to articulate logical thoughts, able   to abstract reason, no tangential thoughts, no hallucinations   or delusions  Her affect is   RESP: No cough, no audible wheezing, able to talk in full sentences  Remainder of exam unable to be completed due to telephone visits                Phone call duration: 2 minutes

## 2021-01-30 ENCOUNTER — RECORDS - HEALTHEAST (OUTPATIENT)
Dept: ADMINISTRATIVE | Facility: OTHER | Age: 36
End: 2021-01-30

## 2021-02-01 ENCOUNTER — PATIENT OUTREACH (OUTPATIENT)
Dept: CARE COORDINATION | Facility: CLINIC | Age: 36
End: 2021-02-01

## 2021-02-01 DIAGNOSIS — R10.9 ACUTE RIGHT FLANK PAIN: Primary | ICD-10-CM

## 2021-02-01 NOTE — PROGRESS NOTES
Clinic Care Coordination Contact  [facility]/Voicemail       Clinical Data: CHW Outreach  Outreach attempted x 1.  Left message on patient's voicemail with call back information and requested return call.    Plan: CHW will try to reach patient again in 1-2 business days.    Eva CHOI Community Health Worker  Clinic Care Coordination  [facility] : [place], [place] & [place]  Phone: [phone]    Reason for Referral: Care Transition: ED to outpatient  Additional pertinent details: Acute right flank pain      Notes:    Seen you were in the ER 01/30/21    Any questions for CC RN

## 2021-02-01 NOTE — LETTER
[facility] CARE COORDINATION  [id number]  [facility] [place] [address]    February 2, 2021    Armida Quiñonez  [address] [place] [address]      Dear Armida,    I am a clinic community health worker who works with Catherine Seo MD at [facility] . I wanted to thank you for spending the time to talk with me.  Below is a description of clinic care coordination and how I can further assist you.      The clinic care coordination team is made up of a registered nurse,  and community health worker who understand the health care system. The goal of clinic care coordination is to help you manage your health and improve access to the health care system in the most efficient manner. The team can assist you in meeting your health care goals by providing education, coordinating services, strengthening the communication among your providers and supporting you with any resource needs.    Please feel free to contact me at [phone] with any questions or concerns. We are focused on providing you with the highest-quality healthcare experience possible and that all starts with you.     Sincerely,     Eva CHOI, Community Health Worker  Clinic Care Coordination  [facility] : [place], [place] & [place]  Phone: [phone]

## 2021-02-02 ENCOUNTER — COMMUNICATION - HEALTHEAST (OUTPATIENT)
Dept: ADMINISTRATIVE | Facility: HOSPITAL | Age: 36
End: 2021-02-02

## 2021-02-02 NOTE — PROGRESS NOTES
Clinic Care Coordination Contact  Community Health Worker Initial Outreach       CHW Additional Questions  If ED/Hospital discharge, follow-up appointment scheduled as recommended?: Yes  Medication changes made following ED/Hospital discharge?: No  MyChart active?: Yes  Patient sent Social Determinants of Health questionnaire?: No    Patient accepts CC: No, Patient declined CCC. she said she did not habe any questions at this time. . Patient will be sent Care Coordination introduction letter for future reference.     Eva CHOI Community Health Worker  Clinic Care Coordination  [facility] : [place], [place] & [place]  Phone: [phone]    Reason for Referral: Care Transition: ED to outpatient  Additional pertinent details: Acute right flank pain

## 2021-02-23 ENCOUNTER — RECORDS - HEALTHEAST (OUTPATIENT)
Dept: ADMINISTRATIVE | Facility: OTHER | Age: 36
End: 2021-02-23

## 2021-02-23 ENCOUNTER — COMMUNICATION - HEALTHEAST (OUTPATIENT)
Dept: ADMINISTRATIVE | Facility: HOSPITAL | Age: 36
End: 2021-02-23

## 2021-02-24 ENCOUNTER — AMBULATORY - HEALTHEAST (OUTPATIENT)
Dept: INFUSION THERAPY | Facility: HOSPITAL | Age: 36
End: 2021-02-24

## 2021-02-24 ENCOUNTER — OFFICE VISIT - HEALTHEAST (OUTPATIENT)
Dept: ONCOLOGY | Facility: HOSPITAL | Age: 36
End: 2021-02-24

## 2021-02-24 DIAGNOSIS — L30.1 DYSHIDROTIC ECZEMA: ICD-10-CM

## 2021-02-24 DIAGNOSIS — D50.0 IRON DEFICIENCY ANEMIA DUE TO CHRONIC BLOOD LOSS: ICD-10-CM

## 2021-02-24 DIAGNOSIS — R89.9 ABNORMAL LABORATORY TEST RESULT: ICD-10-CM

## 2021-02-24 LAB
ERYTHROCYTE [DISTWIDTH] IN BLOOD BY AUTOMATED COUNT: 13.2 % (ref 11–14.5)
FERRITIN SERPL-MCNC: 36 NG/ML (ref 10–130)
HCT VFR BLD AUTO: 37.2 % (ref 35–47)
HGB BLD-MCNC: 12.1 G/DL (ref 12–16)
MCH RBC QN AUTO: 27.6 PG (ref 27–34)
MCHC RBC AUTO-ENTMCNC: 32.5 G/DL (ref 32–36)
MCV RBC AUTO: 85 FL (ref 80–100)
PLATELET # BLD AUTO: 298 THOU/UL (ref 140–440)
PMV BLD AUTO: 10.1 FL (ref 8.5–12.5)
RBC # BLD AUTO: 4.39 MILL/UL (ref 3.8–5.4)
WBC: 6.5 THOU/UL (ref 4–11)

## 2021-03-02 ENCOUNTER — COMMUNICATION - HEALTHEAST (OUTPATIENT)
Dept: ONCOLOGY | Facility: HOSPITAL | Age: 36
End: 2021-03-02

## 2021-04-17 ENCOUNTER — HEALTH MAINTENANCE LETTER (OUTPATIENT)
Age: 36
End: 2021-04-17

## 2021-05-11 ENCOUNTER — OFFICE VISIT - HEALTHEAST (OUTPATIENT)
Dept: FAMILY MEDICINE | Facility: CLINIC | Age: 36
End: 2021-05-11

## 2021-05-11 DIAGNOSIS — A54.9 GONORRHEA: ICD-10-CM

## 2021-05-11 DIAGNOSIS — J45.20 MILD INTERMITTENT ASTHMA WITHOUT COMPLICATION: ICD-10-CM

## 2021-05-11 DIAGNOSIS — L30.1 DYSHIDROTIC ECZEMA: ICD-10-CM

## 2021-05-11 DIAGNOSIS — Z11.3 SCREEN FOR STD (SEXUALLY TRANSMITTED DISEASE): ICD-10-CM

## 2021-05-11 DIAGNOSIS — M54.50 MIDLINE LOW BACK PAIN, UNSPECIFIED CHRONICITY, UNSPECIFIED WHETHER SCIATICA PRESENT: ICD-10-CM

## 2021-05-11 LAB — HIV 1+2 AB+HIV1 P24 AG SERPL QL IA: NEGATIVE

## 2021-05-11 ASSESSMENT — PATIENT HEALTH QUESTIONNAIRE - PHQ9: SUM OF ALL RESPONSES TO PHQ QUESTIONS 1-9: 8

## 2021-05-12 LAB
HBV SURFACE AG SERPL QL IA: NEGATIVE
HCV AB SERPL QL IA: NEGATIVE
T PALLIDUM AB SER QL: NEGATIVE

## 2021-05-13 ENCOUNTER — AMBULATORY - HEALTHEAST (OUTPATIENT)
Dept: NURSING | Facility: CLINIC | Age: 36
End: 2021-05-13

## 2021-05-13 LAB
C TRACH DNA SPEC QL PROBE+SIG AMP: NEGATIVE
N GONORRHOEA DNA SPEC QL NAA+PROBE: POSITIVE

## 2021-05-26 ASSESSMENT — PATIENT HEALTH QUESTIONNAIRE - PHQ9: SUM OF ALL RESPONSES TO PHQ QUESTIONS 1-9: 16

## 2021-05-27 VITALS
BODY MASS INDEX: 45.28 KG/M2 | OXYGEN SATURATION: 99 % | DIASTOLIC BLOOD PRESSURE: 81 MMHG | SYSTOLIC BLOOD PRESSURE: 129 MMHG | WEIGHT: 270 LBS | RESPIRATION RATE: 16 BRPM | HEART RATE: 82 BPM

## 2021-05-27 ASSESSMENT — PATIENT HEALTH QUESTIONNAIRE - PHQ9
SUM OF ALL RESPONSES TO PHQ QUESTIONS 1-9: 8
SUM OF ALL RESPONSES TO PHQ QUESTIONS 1-9: 22

## 2021-05-28 ASSESSMENT — ANXIETY QUESTIONNAIRES
GAD7 TOTAL SCORE: 19
GAD7 TOTAL SCORE: 21

## 2021-05-28 ASSESSMENT — ASTHMA QUESTIONNAIRES: ACT_TOTALSCORE: 19

## 2021-06-02 NOTE — TELEPHONE ENCOUNTER
New Appointment Needed  What is the reason for the visit:    Pregnancy Confirmation Appt Needed  When was the first day of your last menstrual cycle?: NOT SURE  Have you done a home pregnancy yes confirmation through [facility]    Provider Preference: Any available  How soon do you need to be seen?: tomorrow  Waitlist offered?: Yes  Okay to leave a detailed message:  Yes

## 2021-06-02 NOTE — TELEPHONE ENCOUNTER
I have been unable to reach this patient by phone. Phone number provider would not ring. Please assist patient in scheduling an appointment.

## 2021-06-02 NOTE — TELEPHONE ENCOUNTER
Patient Returning Call  Reason for call:  Unknown. Patient states she received a call, but is not sure why the clinic was calling her. Writer is also unsure as there are no notes indicating who or why she was called.  Information relayed to patient:  None  Patient has additional questions:  Yes  If YES, what are your questions/concerns:  Patient would like a call back today as she is concerned about her results from yesterday.   Okay to leave a detailed message?: Yes

## 2021-06-02 NOTE — TELEPHONE ENCOUNTER
Armida came into the clinic today for a lab appt. She mentioned she was told to set up another lab appt for a week later. Though she is unsure if she was to wait until these results came in or if she was to schedule anyways.   Patient is requesting a call back with results and to clear up this confusion.   I did print out [employer] code for her to sign up for any further communication.  Thank you.

## 2021-06-02 NOTE — PROGRESS NOTES
OFFICE VISIT - FAMILY MEDICINE     ASSESSMENT AND PLAN     1. Pregnancy examination or test, pregnancy unconfirmed  Pregnancy, Urine    US OB < 14 Weeks With Transvaginal    HM1(CBC and Differential)    Thyroid Cascade    Beta-hCG, Quantitative    HM1 (CBC with Diff)    Urinalysis-UC if Indicated   2. Mild recurrent major depression (H)  Ambulatory referral to Psychology   New patient, she is here to establish care, she is currently pregnant at about 5 weeks and a few days  by ultrasound, this is her sixth pregnancy, she has 3 living children, one miscarriage, one , unfortunately EDC could not be done because of absence of fetal pole, beta hCG has significantly increased, we discussed importance of keeping  adequate weight  during pregnancy, taking prenatal vitamin, she will be establishing care with obstetrician.  She also has a history of depression, currently getting a lot stress, father of the baby did not currently involving the care, we discussed about seeing a therapist and she is fine.  She will follow with me as needed otherwise follow with obstetrician for management of her pregnancy.    CHIEF COMPLAINT   Pregnancy confirmation    HPI   Armida Quiñonez is a 34 y.o. female.  No Patient Care Coordination Note on file.  Armida is here today to establish care and follow up after presenting to the emergency room on 2019. She was experiencing abdominal pain, vomiting, and vaginal bleeding. At that time, she had a positive urine pregnancy test and her HCG was 67. Two days later, her HCG level was 170. On ultrasound, they were unable to detect an intrauterine pregnancy, so she was advised to follow up with obstetrics/primary care.  She has been feeling nauseous in the morning and vomiting for about 3 weeks. She said she experienced morning sickness in the first trimester of her previous pregnancies. Armida has 3 live children, has had 1 miscarriage, and 1 .  Other symptoms she is  "experiencing are heightened sense of smell, swelling in her ankles and feet, and changes in her mood. She said she has felt depressed for the past couple of weeks. She has been crying frequently, is easily irritable, and has spent most of her days in bed. She has had depression and anxiety for a while now and is interested in talking to a therapist, counselor, or psychologist.  Currently in disagreement with the father of the baby.  She is thinking her last menstrual period was at the end of August early September.  She is not quite sure about the date, this was an unplanned pregnancy.  Hospital records reviewed  Review of Systems As per HPI, otherwise negative.    OBJECTIVE   /80   Pulse 80   Ht 5' 4\" (1.626 m)   Wt (!) 271 lb (122.9 kg)   BMI 46.52 kg/m    Physical Exam   General: well-appearing, very talkative, pleasant affect  Respiratory: normal work of breathing, lung fields clear bilaterally, no wheezes or rales  CV: RRR, no murmurs, normal S1 and S2  Extremities: mild, non-pitting edema in ankles and feet, posterior tibial pulses full and equal bilaterally    PFSH   No family history on file.  Social History     Socioeconomic History     Marital status:      Spouse name: Not on file     Number of children: Not on file     Years of education: Not on file     Highest education level: Not on file   Occupational History     Not on file   Social Needs     Financial resource strain: Not on file     Food insecurity:     Worry: Not on file     Inability: Not on file     Transportation needs:     Medical: Not on file     Non-medical: Not on file   Tobacco Use     Smoking status: Not on file   Substance and Sexual Activity     Alcohol use: Not on file     Drug use: Not on file     Sexual activity: Not on file   Lifestyle     Physical activity:     Days per week: Not on file     Minutes per session: Not on file     Stress: Not on file   Relationships     Social connections:     Talks on phone: Not on " file     Gets together: Not on file     Attends [employer] service: Not on file     Active member of club or organization: Not on file     Attends meetings of clubs or organizations: Not on file     Relationship status: Not on file     Intimate partner violence:     Fear of current or ex partner: Not on file     Emotionally abused: Not on file     Physically abused: Not on file     Forced sexual activity: Not on file   Other Topics Concern     Not on file   Social History Narrative     Not on file       [facility]   There are no active problems to display for this patient.    No past surgical history on file.    RESULTS/CONSULTS (Lab/Rad)     Recent Results (from the past 168 hour(s))   Pregnancy, Urine   Result Value Ref Range    Pregnancy Test, Urine Positive (!) Negative   Thyroid Cascade   Result Value Ref Range    TSH 1.36 0.30 - 5.00 uIU/mL   Beta-hCG, Quantitative   Result Value Ref Range    Beta-hCG, Quantitative 18,792 (H) 0 - 4 mlU/mL   HM1 (CBC with Diff)   Result Value Ref Range    WBC 5.6 4.0 - 11.0 thou/uL    RBC 4.25 3.80 - 5.40 mill/uL    Hemoglobin 12.2 12.0 - 16.0 g/dL    Hematocrit 36.8 35.0 - 47.0 %    MCV 87 80 - 100 fL    MCH 28.6 27.0 - 34.0 pg    MCHC 33.1 32.0 - 36.0 g/dL    RDW 12.3 11.0 - 14.5 %    Platelets 305 140 - 440 thou/uL    MPV 7.4 7.0 - 10.0 fL    Neutrophils % 51 50 - 70 %    Lymphocytes % 41 (H) 20 - 40 %    Monocytes % 7 2 - 10 %    Eosinophils % 1 0 - 6 %    Basophils % 0 0 - 2 %    Neutrophils Absolute 2.9 2.0 - 7.7 thou/uL    Lymphocytes Absolute 2.3 0.8 - 4.4 thou/uL    Monocytes Absolute 0.4 0.0 - 0.9 thou/uL    Eosinophils Absolute 0.1 0.0 - 0.4 thou/uL    Basophils Absolute 0.0 0.0 - 0.2 thou/uL   Urinalysis-UC if Indicated   Result Value Ref Range    Color, UA Yellow Colorless, Yellow, Straw, Light Yellow    Clarity, UA Clear Clear    Glucose, UA Negative Negative    Bilirubin, UA Small (!) Negative    Ketones, UA Trace (!) Negative    Specific Gravity, UA 1.025 1.005 - 1.030     Blood, UA Negative Negative    pH, UA 6.0 5.0 - 8.0    Protein, UA Trace (!) Negative mg/dL    Urobilinogen, UA 0.2 E.U./dL 0.2 E.U./dL, 1.0 E.U./dL    Nitrite, UA Negative Negative    Leukocytes, UA Negative Negative    Bacteria, UA Few (!) None Seen hpf    RBC, UA None Seen None Seen, 0-2 hpf    WBC, UA None Seen None Seen, 0-5 hpf    Squam Epithel, UA 5-10 (!) None Seen, 0-5 lpf    Amorphous, UA Many (!) None Seen     Us Ob < 14 Weeks With Transvaginal    Result Date: 10/15/2019  EXAM: US OB < 14 WEEKS WITH TRANSVAGINAL LOCATION: [facility] DATE/TIME: 10/15/2019 6:15 PM INDICATION: Pregnant, unsure dates COMPARISON: 09/26/2019 TECHNIQUE: Transabdominal scans were performed. Endovaginal ultrasound was performed to better visualize the embryo. FINDINGS: UTERUS: Single normal appearing intrauterine gestation sac with yolk sac and no fetal pole with mean diameter of 1.0 cm corresponding to 5 weeks 5 days. 1.0 x 1.1 x 1.3 cm presumed fibroid lower uterine segment posteriorly towards the left. AMNIOTIC FLUID: Normal. PLACENTA: Not yet formed. No evidence for sub-chorionic hemorrhage. RIGHT OVARY: Normal. LEFT OVARY: Normal.     1. Single intrauterine gestational sac with yolk sac and no fetal pole. Recommend following serial beta hCGs.      HEALTH MAINTENANCE / SCREENING   No data recorded, No data recorded,No data recorded    There is no immunization history on file for this patient.  Health Maintenance   Topic     DEPRESSION FOLLOW UP      PREVENTIVE CARE VISIT      HPV TEST      HIV SCREENING      TD 18+ HE      TDAP ADULT ONE TIME DOSE      ADVANCE CARE PLANNING      PAP SMEAR      INFLUENZA VACCINE RULE BASED (1)     The following are part of a depression follow up plan for the patient:  coping support assessment, coping support management, emotional support assessment and emotional support education  Leon Meyers MD   I was present with the medical student (Savi Candelario) who  participated in the service and in the documentation of the note. I have verified the history and personally performed the physical exam and medical decision making. I agree with the assessment and plan of care as documented in the note above.    Family Medicine, [facility]   This transcription uses voice recognition software, which may contain typographical errors.

## 2021-06-02 NOTE — TELEPHONE ENCOUNTER
Who is calling:  Patient   Reason for Call:  Patient is still needing to be seen today. This should've been rerouted yesterday. Please call patient today with options.    Okay to leave a detailed message: Yes

## 2021-06-03 VITALS
HEART RATE: 90 BPM | OXYGEN SATURATION: 100 % | WEIGHT: 270 LBS | DIASTOLIC BLOOD PRESSURE: 80 MMHG | SYSTOLIC BLOOD PRESSURE: 128 MMHG | BODY MASS INDEX: 46.35 KG/M2

## 2021-06-03 VITALS
HEART RATE: 80 BPM | WEIGHT: 271 LBS | BODY MASS INDEX: 46.26 KG/M2 | DIASTOLIC BLOOD PRESSURE: 80 MMHG | HEIGHT: 64 IN | SYSTOLIC BLOOD PRESSURE: 128 MMHG

## 2021-06-03 NOTE — TELEPHONE ENCOUNTER
Triage note:    34 year old female called with concerns about current miscarriage at about 8+ weeks gestation.    She was seen in ED last night. They advised she follow up with PCP and may need another ultrasound or a D&C.     She reports she has been having severe contraction pain every 5 minutes that lasts about one minute.  She passed blood clots and some tissue yesterday.  Today the bleeding is mild and red/brown in color.  There is only blood with wiping.   She has tried Tylenol 2 tablet and it helps on a little.     RN triaged to discuss with PCP.  Does she need to be seen today?  If so, can she see PCP? Or can she have a ultrasound scheduled? Please advise.       Reason for Disposition    Nursing judgment    Protocols used: NO PROTOCOL AVAILABLE - SICK ADULT-A-[place]    Moriah Carrasco RN, [facility] Refill/Triage, 11/4/2019 11:58 AM

## 2021-06-05 VITALS
DIASTOLIC BLOOD PRESSURE: 71 MMHG | OXYGEN SATURATION: 98 % | TEMPERATURE: 98.8 F | WEIGHT: 268 LBS | SYSTOLIC BLOOD PRESSURE: 131 MMHG | HEART RATE: 86 BPM | BODY MASS INDEX: 46 KG/M2

## 2021-06-05 NOTE — TELEPHONE ENCOUNTER
----- Message from Leon Meyers MD sent at 1/9/2020  7:27 PM CST -----  Iron level is still low, as we discussed take over-the-counter ferrous sulfate 325 mg daily, recheck in a couple of months.

## 2021-06-05 NOTE — PROGRESS NOTES
Hospital Follow-up Visit:    Assessment/Plan:     1. Anemia associated with acute blood loss  - HM1(CBC and Differential)  - Comprehensive Metabolic Panel  - Ferritin  - Iron and Transferrin Iron Binding Capacity  - ferrous sulfate 325 (65 FE) MG tablet; Take 1 tablet (325 mg total) by mouth daily.  Dispense: 30 tablet; Refill: 3  - HM1 (CBC with Diff)    2. Dyshidrotic eczema  - betamethasone, augmented, (DIPROLENE-AF) 0.05 % cream; NICK BID TO HANDS AND ARMS ONLY PRN  Dispense: 30 g; Refill: 6    3. Seborrheic dermatitis of scalp  - ketoconazole (NIZORAL) 2 % shampoo; Use once daily for 2 weeks  Dispense: 120 mL; Refill: 0  - mupirocin (BACTROBAN) 2 % ointment; Apply to scalp twice daily  Dispense: 30 g; Refill: 2        Subjective:     Armida Quiñonez is a 34 y.o. female who presents for a hospital discharge follow up.  Did have a miscarriage, initially she thought that she can make it on her own, but unfortunately few weeks ago she started having stomach cramp with vaginal bleeding, was seen in the ER admitted at [facility], D&C was performed.  She is here today for follow-up denies any cramping or pain no bleeding.  She has seborrhea capitis, would like a refill of her medication.      Hospital/Nursing Home/IP Rehab Facility: [facility]  Date of Admission: 12/17/19  Date of Discharge:12/18/19  Reason(s) for Admission:Miscarriage            Do you have any problems taking your medication regularly?  None       Have you had any changes in your medication since discharge? None       Have you had any difficulty following your discharge or treatment plan?  No    Summary of hospitalization:  Hospital discharge summary reviewed  Diagnostic Tests/Treatments reviewed.  Follow up needed: None  Other Healthcare Providers Involved in Patient's Care: Patient Care Team:  Leon Meyers MD as PCP - General (Family Medicine)      Update since discharge: {improved   Information from family, SNF, care coordination: no     Post  Discharge Medication Reconciliation: discharge medications reconciled and changed, per note/orders (see AVS)  Plan of care communicated with: patient    Objective:     Vitals:    01/08/20 1434   BP: 128/80   Pulse: 90   SpO2: 100%   Weight: (!) 270 lb (122.5 kg)   LMP: 08/12/2019         Physical Exam:  Physical Examination: General appearance - alert, well appearing, and in no distress  Mental status - alert, oriented to person, place, and time  Chest - clear to auscultation, no wheezes, rales or rhonchi, symmetric air entry  Heart - normal rate, regular rhythm, normal S1, S2, no murmurs, rubs, clicks or gallops  Abdomen - soft, nontender, nondistended, no masses or organomegaly  Extremities - peripheral pulses normal, no pedal edema, no clubbing or cyanosis      Coding guidelines for this visit:  Type of Medical   Decision Making Face-to-Face Visit       within 7 Days of discharge Face-to-Face Visit        within 14 days of discharge   Moderate Complexity [id number]   High Complexity [id number]       Electronically signed by Leon Meyers MD 01/09/20 2:37 PM

## 2021-06-06 NOTE — PROGRESS NOTES
"Mental Health Visit Note    3/4/2020    Start time: 1:08 PM    Stop Time: 2:01 PM   Session # 1    Session Type: Patient is presenting for an Individual session.    Armida Quiñonez is a 34 y.o. female is being seen today for    Chief Complaint   Patient presents with      Follow Up     Depression     grief/loss issues     Anxiety     relationship issues   .     New symptoms or complaints: None    Functional Impairment:   Personal: 3  Family: 2  Work: 2  Social:2    Clinical assessment of mental status: Armida presented on time for her appointment.  She was oriented x3, open and cooperative, and dressed appropriately for this session and weather. Her memory was normal cognitive functioning. Her speech was within normal limits. Language was appropriate to discussion. Concentration and focus is WNL. Psychosis is not noted, nor reported. Her mood is mildly dysphoric and he affect is stable and mood-congruent.  Fund of knowledge is adequate. Insight is adequate for therapy.     Suicidal/Homicidal Ideation present: None Reported This Session    Patient's impression of their current status: Patient stated, \"We moved on Valentine's Day. I haven't had my own place for 7 years. I'm just trying to build some stability,\" as she elaborated on moving and efforts to continue to be goal-focused.  She acknowledged wanting to practice more self care, including losing weight and nurturing more physical activity.  She is also looking into a possible part-time employment opportunity.  She discussed her marriage, and considered the adjustments she and her  are facing in their first year of marriage.  She acknowledged that she started crying last night as she was missing her family in [place], and stated, \"I feel like I abandoned them.\" She explored expectation of self and other.  She reflected on the values that are most meaningful to her (stability, family, parenting, marriage, trust, rewarding work, health, well-being, harmony) " and the behaviors that serve them.    Therapist impression of patients current state: Patient is beginning to engage in the therapeutic process. Her thoughts, feelings and beliefs were processed. She is receptive to exploring CBT and ACT therapeutic models for reducing symptoms.  She is also willing to examine and enhance interpersonal boundaries, notably in her (new) marriage.  She is a thoughtful and pleasant person.  She appears focused on building stability in her life.      Type of psychotherapeutic technique provided: Insight oriented, Client centered, Solution-focused, CBT and ACT    Progress toward short term goals:Progress as expected, therapeutic rapport building in progress, patient is receptive to therapeutic techniques to improve symptom management.    Review of long term goals: Treatment Plan completed.    Diagnosis:   1. Major depressive disorder, recurrent episode, severe with anxious distress (H)        Plan and Follow up: Patient plans to bring intention to establishing and nurturing healthy daily routines, including healthy meals, sleep hygiene, and physical activity.  She plans on attending a job interview later today.  She also plans on nurturing healthy boundaries in her marriage, including assertive communication. She plans on practicing looking at her thoughts versus from her thoughts and feelings, nurturing a stance of curiosity and acceptance with her self. Plans to return for follow up next week.      Discharge Criteria/Planning: Patient will continue with follow-up until therapy can be discontinued without return of signs and symptoms.    Patti Somers 3/4/2020
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
## 2021-06-06 NOTE — PROGRESS NOTES
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 assess patient safety in the community.    Is patient's family involved in the treatment?  [x] No     [] Yes    If yes, How?  Patient expresses an interest in individual treatment sessions.    If no, Why?  Should patient wish to include a loved one in her future treatment sessions, her change in preference will be honored as it is identified as supportive of her therapy goals.      Therapist s Signature:   Performed and documented by SHIRA Gibbons, [facility].

## 2021-06-07 NOTE — PROGRESS NOTES
Called patient today at number in [facility] for a phone session.  Left voice mail requesting patient return call.

## 2021-06-07 NOTE — PROGRESS NOTES
Called patient at number in epic, [phone], and left her a voice mail informing her that her 3/25 psychotherapy appointment has been changed to a telephone visit due to the COVID-19 precaution protocols.  Wished her well.

## 2021-06-10 NOTE — TELEPHONE ENCOUNTER
New Appointment Needed  What is the reason for the visit:    Pregnancy Confirmation Appt Needed  When was the first day of your last menstrual cycle?: 6/26/2020  Have you done a home pregnancy test?: No.    Provider Preference: PCP only  How soon do you need to be seen?: next week  Waitlist offered?: No  Okay to leave a detailed message:  Yes

## 2021-06-14 NOTE — PROGRESS NOTES
Clinic Care Coordination Contact  [facility]       Clinical Data: Care Coordinator Outreach    Patient attended virtual visit with provider at [facility] 1/20    Outreach attempted x 2.  Left message on patient's voicemail with call back information and requested return call.  Plan: Care Coordinator will send care coordination introduction letter with care coordinator contact information and explanation of care coordination services via [employer]. Care Coordinator will do no further outreaches at this time.       Clinic Care Coordination Contact  [facility] [facility]     Proactive outreach to support patient to establish care with new PCP clinic due to legacy clinic closer    Clinical Data: Care Coordinator Outreach  Outreach attempted x 1.  Left message on patient's voicemail with call back information and requested return call.  Plan:  Care Coordinator will try to reach patient again in 1-2 business days.

## 2021-06-15 NOTE — TELEPHONE ENCOUNTER
Call placed to patient to let her know Dr Cervantes reviewed er recent labs and they were all normal - no need for iron at this time. Pt verbalized understanding.

## 2021-06-15 NOTE — CONSULTS
[facility] Hematology and Oncology Consult Note    Patient: Armida Quiñonez  MRN: [id number]  Date of Service: 02/24/2021      Reason for Visit:    1.  Low thrombin time  2.  Iron deficiency anemia    Assessment/Plan:    1.  Low thrombin time.  This was seen on labs from January 20, 2021.  Generally speaking, thrombin time is used to evaluate for bleeding disorders.  These are manifest by a prolonged thrombin time.  A short thrombin time generally does not indicate a hemostatic abnormality.  It is not a risk factor for clotting.  No further evaluation needs to be done.  Other testing at the time was negative for anticardiolipin antibody or lupus anticoagulant.    2.  Iron deficiency anemia: She has a mild anemia and a borderline low mean cell volume.  We will check her ferritin today.  If it is low we will treat her with Injectafer as she has difficulty with iron pills.    ECOG Performance   ECOG Performance Status: 0    Distress Assessment  Distress Assessment Score: 1    Problem List:    1. Dyshidrotic eczema     2. Iron deficiency anemia due to chronic blood loss  Ferritin    HM2(CBC w/o Differential)   3. Abnormal laboratory test result       Staging History:    Cancer Staging  No matching staging information was found for the patient.    History:    Armida is a 35-year-old woman who is referred today for a low thrombin time.  This was done during an obstetric evaluation.  She has a history of 2 miscarriages which she states occurred around 3 months of pregnancy.  She has had some heavy periods in the past.  No known thromboembolic disease.  She is unaware of her family history as she is adopted.  No acute complaints today.    Past History:    History reviewed. No pertinent past medical history.    Uterine fibroids   History reviewed. No pertinent family history.   No known blood dyscrasias         [unfilled] Social History     Socioeconomic History     Marital status:      Spouse name: Not on file     Number  of children: Not on file     Years of education: Not on file     Highest education level: Not on file   Occupational History     Not on file   Social Needs     Financial resource strain: Not on file     Food insecurity     Worry: Not on file     Inability: Not on file     Transportation needs     Medical: Not on file     Non-medical: Not on file   Tobacco Use     Smoking status: Never Smoker     Smokeless tobacco: Never Used   Substance and Sexual Activity     Alcohol use: Yes     Comment: 3 drinks per month     Drug use: Yes     Types: Marijuana     Sexual activity: Not Currently   Lifestyle     Physical activity     Days per week: Not on file     Minutes per session: Not on file     Stress: Not on file   Relationships     Social connections     Talks on phone: Not on file     Gets together: Not on file     Attends Latter day service: Not on file     Active member of club or organization: Not on file     Attends meetings of clubs or organizations: Not on file     Relationship status: Not on file     Intimate partner violence     Fear of current or ex partner: Not on file     Emotionally abused: Not on file     Physically abused: Not on file     Forced sexual activity: Not on file   Other Topics Concern     Not on file   Social History Narrative     Not on file        Allergies:    Allergies   Allergen Reactions     Latex Hives     Review of Systems:    General  General (WDL): Exceptions to WDL  Generalized Muscle Weakness: Yes - Chronic (Greater than 3 months)  ENT  ENT (WDL): Exceptions to WDL  Glasses or Contacts: Yes - Chronic (Greater than 3 months)  Tinnitus: Yes - Recent (Less than 3 months)  Sinus Congestion/Drainage: Yes - Recent (Less than 3 months)  Dental Problems: Yes - Chronic (Greater than 3 months)  Respiratory  Respiratory (WDL): All respiratory elements are within defined limits  Cardiovascular  Cardiovascular (WDL): Exceptions to WDL  Edema Limbs: Yes - Chronic (Greater than 3  months)  Lightheadedness: Yes - Recent (Less than 3 months)  Endocrine  Endocrine (WDL): All endocrine elements are within defined limits  Excessive Urination: Yes - Chronic (Greater than 3 months)  Gastrointestinal  Gastrointestinal (WDL): Exceptions to WDL  Heartburn: Yes - Chronic (Greater than 3 months)  Constipation: Yes - Chronic (Greater than 3 months)  Nausea and Vomiting: Yes - Recent (Less than 3 months)  Abdominal Pain: Yes - Chronic (Greater than 3 months)  Poor Appetite: Yes- Recent (Less than 3 months)  Musculoskeletal  Musculoskeletal (WDL): Exceptions to WDL  Back Pain: Yes - Chronic (Greater than 3 months)  Muscle pain or stiffness: Yes - Recent (Less than 3 months)  Neurological  Neurological (WDL): Exceptions to WDL  Headaches: Yes - Chronic (Greater than 3 months)  Difficulty with speech: Yes - Recent (Less than 3 months)  Difficulty with memory: Yes - Chronic (Greater than 3 months)  Dominant Hand: Right  Numbness and/or tingling: Yes - Recent (Less than 3 months)  Psychological/Emotional  Psychological/Emotional (WDL): Exceptions to WDL  Depression: Yes - Chronic (Greater than 3 months)  Insomnia: Yes - Recent (Less than 3 months)  Panic attacks: Yes - Recent (Less than 3 months)  Anxiety: Yes - Chronic (Greater than 3 months)  Daytime sleepiness: Yes - Chronic (Greater than 3 months)  Hematological/Lymphatic  Hematological/Lymphatic (WDL): Exceptions to WDL  Bleeding gums: Yes - Chronic (Greater than 3 months)  Swollen glands: Yes - Recent (Less than 3 months)  Dermatological  Dermatologic (WDL): Exceptions to WDL  Rash : Yes - Chronic (Greater than 3 months)  Hair Loss: Yes - Chronic (Greater than 3 months)  Genitourinary/Reproductive  Genitourinary/Reproductive (WDL): Exceptions to WDL  Urinary Frequency: Yes - Recent (Less than 3 months)  Urinary Urgency: Yes - Recent (Less than 3 months)  Reproductive (Females only)  Menstrual irritation or increase in discharge: No  Age at start of  periods: 15  Last menstural cycle: 2-4-21  Number of pregnancies: 6  Age at first pregnancy: 21  Patient Pregnant?: No  Is the patient trying to get pregnant?: Yes  Is the patient on birth control: No  Pain  Currently in Pain: No/denies    Physical Exam:    Recent Vitals 2/24/2021   Height -   Weight 268 lbs   BSA (m2) 2.34 m2   /71   Pulse 86   Temp 98.8   Temp src 1   SpO2 98     General: patient appears stated age of 35 y.o.. Nontoxic and in no distress.   HEENT: Head: atraumatic, normocephalic. Sclerae anicteric.  Chest:  Normal respiratory effort  Cardiac:  No edema.   Abdomen: abdomen is non-distended  Extremities: normal tone and muscle bulk.  Skin: no lesions or rash. Warm and dry.   CNS: alert and oriented. Grossly non-focal.   Psychiatric: normal mood and affect.     Lab Results:    Recent Results (from the past 168 hour(s))   HM2(CBC w/o Differential)   Result Value Ref Range    WBC 6.5 4.0 - 11.0 thou/uL    RBC 4.39 3.80 - 5.40 mill/uL    Hemoglobin 12.1 12.0 - 16.0 g/dL    Hematocrit 37.2 35.0 - 47.0 %    MCV 85 80 - 100 fL    MCH 27.6 27.0 - 34.0 pg    MCHC 32.5 32.0 - 36.0 g/dL    RDW 13.2 11.0 - 14.5 %    Platelets 298 140 - 440 thou/uL    MPV 10.1 8.5 - 12.5 fL       Signed by: Howie Cervantes MD

## 2021-06-17 ENCOUNTER — OFFICE VISIT - HEALTHEAST (OUTPATIENT)
Dept: FAMILY MEDICINE | Facility: CLINIC | Age: 36
End: 2021-06-17

## 2021-06-17 DIAGNOSIS — N89.8 VAGINAL DISCHARGE: ICD-10-CM

## 2021-06-17 DIAGNOSIS — R35.0 URINE FREQUENCY: ICD-10-CM

## 2021-06-17 DIAGNOSIS — Z30.017 NEXPLANON INSERTION: ICD-10-CM

## 2021-06-17 DIAGNOSIS — F17.210 CIGARETTE NICOTINE DEPENDENCE WITHOUT COMPLICATION: ICD-10-CM

## 2021-06-17 DIAGNOSIS — Z12.4 PAP SMEAR FOR CERVICAL CANCER SCREENING: ICD-10-CM

## 2021-06-17 LAB
ALBUMIN UR-MCNC: NEGATIVE G/DL
APPEARANCE UR: CLEAR
BILIRUB UR QL STRIP: NEGATIVE
CLUE CELLS: ABNORMAL
COLOR UR AUTO: YELLOW
GLUCOSE UR STRIP-MCNC: NEGATIVE MG/DL
HCG UR QL: NEGATIVE
HGB UR QL STRIP: NEGATIVE
KETONES UR STRIP-MCNC: NEGATIVE MG/DL
LEUKOCYTE ESTERASE UR QL STRIP: NEGATIVE
NITRATE UR QL: NEGATIVE
PH UR STRIP: 6.5 [PH] (ref 5–8)
SP GR UR STRIP: 1.02 (ref 1–1.03)
TRICHOMONAS, WET PREP: ABNORMAL
UROBILINOGEN UR STRIP-ACNC: NORMAL
YEAST, WET PREP: ABNORMAL

## 2021-06-17 ASSESSMENT — MIFFLIN-ST. JEOR: SCORE: 1911.36

## 2021-06-17 NOTE — PROGRESS NOTES
OFFICE VISIT - FAMILY MEDICINE     ASSESSMENT AND PLAN     1. Midline low back pain, unspecified chronicity, unspecified whether sciatica present     2. Screen for STD (sexually transmitted disease)  HIV Antigen/Antibody Screening Cascade    Chlamydia trachomatis & Neisseria gonorrhoeae, Amplified Detection    Syphilis Screen, Cascade    Hepatitis B Surface Antigen (HBsAG)    Hepatitis C Antibody (Anti-HCV)   3. Mild intermittent asthma without complication  albuterol (PROAIR HFA;PROVENTIL HFA;VENTOLIN HFA) 90 mcg/actuation inhaler   4. Dyshidrotic eczema  hydrocortisone 2.5 % cream   Exacerbation of lower back pain, we have discussed symptomatic care, she was advised to schedule appointment with chiropractor of choice, will send referral if needed.  Consider referral to spine clinic if still not improving.  At the patient request getting some STD screening, no symptoms.  She is planning to return for her Pap smear and physical.  Mild persistent asthma appears stable on albuterol as needed  Eczema is controlled with as needed hydrocortisone.    CHIEF COMPLAINT   std check, Skin Problem, and Referral    HPI   Armida Quiñonez is a 35 y.o. female.  No Patient Care Coordination Note on file.  Still having lower back pain, mild to moderate, sometimes throbbing, sometimes radiating to the right lower extremity, has worked with a physical therapist, but did not feel like it was very helpful and she did not have time.  She is interested on working with a chiropractor and asking for a referral.  She stating that she divorce from her former , she would like to be tested for STDs and possibly start any contraception.  Asthma has been stable on albuterol as needed no recent flareup.  Dry skin, controlled with hydrocortisone as needed.    Review of Systems As per HPI, otherwise negative.    OBJECTIVE   /81 (Patient Site: Left Arm, Patient Position: Sitting, Cuff Size: Adult Regular)   Pulse 82   Resp 16   Wt (!)  270 lb (122.5 kg)   SpO2 99%   BMI 46.35 kg/m    Physical Exam   Constitutional: She is oriented to person, place, and time. She appears well-developed and well-nourished.   HENT:   Head: Normocephalic and atraumatic.   Neck: Normal range of motion. Neck supple. No JVD present. No tracheal deviation present. No thyromegaly present.   Cardiovascular: Normal rate, regular rhythm, normal heart sounds and intact distal pulses. Exam reveals no gallop and no friction rub.   No murmur heard.  Pulmonary/Chest: Effort normal and breath sounds normal. No respiratory distress. She has no wheezes. She has no rales.   Musculoskeletal:         General: No tenderness or edema.   Lymphadenopathy:     She has no cervical adenopathy.   Neurological: She is alert and oriented to person, place, and time. Coordination normal.   Psychiatric: She has a normal mood and affect. Judgment and thought content normal.       PFSH   No family history on file.  Social History     Socioeconomic History     Marital status:      Spouse name: Not on file     Number of children: Not on file     Years of education: Not on file     Highest education level: Not on file   Occupational History     Not on file   Social Needs     Financial resource strain: Not on file     Food insecurity     Worry: Not on file     Inability: Not on file     Transportation needs     Medical: Not on file     Non-medical: Not on file   Tobacco Use     Smoking status: Never Smoker     Smokeless tobacco: Never Used   Substance and Sexual Activity     Alcohol use: Yes     Comment: 3 drinks per month     Drug use: Yes     Types: Marijuana     Sexual activity: Not Currently   Lifestyle     Physical activity     Days per week: Not on file     Minutes per session: Not on file     Stress: Not on file   Relationships     Social connections     Talks on phone: Not on file     Gets together: Not on file     Attends Hinduism service: Not on file     Active member of club or  organization: Not on file     Attends meetings of clubs or organizations: Not on file     Relationship status: Not on file     Intimate partner violence     Fear of current or ex partner: Not on file     Emotionally abused: Not on file     Physically abused: Not on file     Forced sexual activity: Not on file   Other Topics Concern     Not on file   Social History Narrative     Not on file     Relevant history was reviewed with the patient today, unless noted in HPI, nothing is pertinent for this visit.  [facility]     Patient Active Problem List    Diagnosis Date Noted     Abnormal laboratory test result 02/24/2021     Dyshidrotic eczema 10/22/2018     DEMETRI (generalized anxiety disorder) 07/14/2016     Bipolar 2 disorder (H) 05/11/2016     Mild persistent asthma, uncomplicated 05/04/2016     Morbid obesity (H) 02/04/2015     Encounter for screening for cardiovascular disorders 10/31/2010     No past surgical history on file.    RESULTS/CONSULTS (Lab/Rad)   No results found for this or any previous visit (from the past 168 hour(s)).  No results found.  MEDICATIONS     Current Outpatient Medications on File Prior to Visit   Medication Sig Dispense Refill     acetaminophen (TYLENOL) 500 MG tablet Take 1,000 mg by mouth.       betamethasone, augmented, (DIPROLENE-AF) 0.05 % cream NICK BID TO HANDS AND ARMS ONLY PRN 30 g 6     ferrous sulfate 325 (65 FE) MG tablet Take 1 tablet (325 mg total) by mouth daily. 30 tablet 3     hydrocortisone 1 % cream NICK EXT AA TID  1     ibuprofen (ADVIL,MOTRIN) 600 MG tablet Take 600 mg by mouth.       ketoconazole (NIZORAL) 2 % shampoo Use once daily for 2 weeks 120 mL 0     ondansetron (ZOFRAN-ODT) 4 MG disintegrating tablet Take 4 mg by mouth.       PRENATAL VITAMIN 27 mg iron- 0.8 mg Tab tablet Take 1 tablet by mouth daily.  3     [DISCONTINUED] albuterol (PROAIR HFA;PROVENTIL HFA;VENTOLIN HFA) 90 mcg/actuation inhaler Inhale 2 puffs.       [DISCONTINUED] mupirocin (BACTROBAN) 2 % ointment  Apply to scalp twice daily 30 g 2     No current facility-administered medications on file prior to visit.        HEALTH MAINTENANCE / SCREENING   PHQ-2 Total Score: 0 (5/11/2021 12:35 PM)  , PHQ-9 Total Score: 8 (5/11/2021 12:35 PM)  ,No data recorded  Immunization History   Administered Date(s) Administered     DTaP, historic 05/02/2012     HPV 9 Valent 03/13/2010, 06/17/2010, 05/02/2012     Hep B, Adult 12/08/2020     INFLUENZA,SEASONAL QUAD, PF, =/> 6months 10/07/2013, 11/07/2016, 10/22/2018     Influenza,seasonal, Inj IIV3 10/15/2009, 06/25/2011     Tdap 05/02/2012     Health Maintenance   Topic     ASTHMA ACTION PLAN      HEPATITIS C SCREENING      PREVENTIVE CARE VISIT      Pneumococcal Vaccine: Pediatrics (0 to 5 Years) and At-Risk Patients (6 to 64 Years) (1 of 2 - PPSV23)     HIV SCREENING      COVID-19 Vaccine (1)     ADVANCE CARE PLANNING      TD 18+ HE      INFLUENZA VACCINE RULE BASED (Season Ended)     Asthma Control Test      PAP SMEAR      TDAP ADULT ONE TIME DOSE      HEPATITIS B VACCINES      Review of external notes as documented above       25 minutes spent on the date of the encounter doing chart review, review of outside records, review of test results, interpretation of tests, patient visit and documentation       Leon Meyers MD  Family Medicine, [facility]     This note was dictated using a voice recognition software.  Any grammatical or context distortion are unintentional and inherent to the software.

## 2021-06-18 LAB
BACTERIA SPEC CULT: NO GROWTH
C TRACH DNA SPEC QL NAA+PROBE: NEGATIVE
HPV SOURCE: NORMAL
HUMAN PAPILLOMA VIRUS 16 DNA: NEGATIVE
HUMAN PAPILLOMA VIRUS 18 DNA: NEGATIVE
HUMAN PAPILLOMA VIRUS FINAL DIAGNOSIS: NORMAL
HUMAN PAPILLOMA VIRUS OTHER HR: NEGATIVE
N GONORRHOEA DNA SPEC QL NAA+PROBE: NEGATIVE
SPEC DESCRIPTION: NORMAL
SPECIMEN DESCRIPTION: NORMAL
SPECIMEN DESCRIPTION: NORMAL

## 2021-06-19 NOTE — LETTER
Letter by Leon Meyers MD at      Author: Leon Meyers MD Service: -- Author Type: --    Filed:  Encounter Date: 10/17/2019 Status: Signed         Armida Quiñonez  [address] [place] [address]             October 17, 2019         Dear Ms. Quiñonez,    Below are the results from your recent visit:    Resulted Orders   Pregnancy, Urine   Result Value Ref Range    Pregnancy Test, Urine Positive (!) Negative    Narrative    This test is for screening purposes. Results should be interpreted along with the clinical picture. Confirmation testing is available if warranted by ordering Test 143, Beta HCG, Quantitative.   Thyroid Cascade   Result Value Ref Range    TSH 1.36 0.30 - 5.00 uIU/mL   Beta-hCG, Quantitative   Result Value Ref Range    Beta-hCG, Quantitative 18,792 (H) 0 - 4 mlU/mL    Narrative    Non-pregnant female . . . . . . . . . . . . . 0-4 (<5) mIU/mL  Equivocal result for early pregnancy . . . . 5-24 mIU/mL  Pregnant Female:  -------------------------------------------------------------  Weeks Post LMP Approximate HCG range(mIU/mL)  (Last Menstrual Period)range  -------------------------------------------------------------  3-4 Weeks . . . . . . . 9- 130 mIU/mL  4-5 Weeks . . . . . . . 75- 2,600 mIU/mL  5-6 Weeks . . . . . . 850- 20,800 mIU/mL  6-7 Weeks . . . . . 4,000-100,200 mIU/mL  7-12 Weeks . . . . . 11,500-289,000 mIU/mL  12-16 Weeks . . . . . 18,300-137,000 mIU/mL  16-29 Weeks. . . . . . 1,400- 53,000 mIU/mL  (2nd Trimester)    29-41 Weeks. . . . . . . 940- 60,000 mIU/mL    (3rd Trimester)  INTERPRETIVE NOTE:  For diagnostic purposes, hCG results should be used  conjunction with other data.  If the hCG level is inconsistent with clinical evidence,  results should be confirmed by an alternate hCG method.  This assay is approved for use in the early detection  of pregnancy only. It is not approved for any other  uses such as tumor marker screening or monitoring.  Beta  HCG ranges were updated 2/2007 to reflect  methodology referencing to the 4th World Health  Organization (WHO) International Standard.   HM1 (CBC with Diff)   Result Value Ref Range    WBC 5.6 4.0 - 11.0 thou/uL    RBC 4.25 3.80 - 5.40 mill/uL    Hemoglobin 12.2 12.0 - 16.0 g/dL    Hematocrit 36.8 35.0 - 47.0 %    MCV 87 80 - 100 fL    MCH 28.6 27.0 - 34.0 pg    MCHC 33.1 32.0 - 36.0 g/dL    RDW 12.3 11.0 - 14.5 %    Platelets 305 140 - 440 thou/uL    MPV 7.4 7.0 - 10.0 fL    Neutrophils % 51 50 - 70 %    Lymphocytes % 41 (H) 20 - 40 %    Monocytes % 7 2 - 10 %    Eosinophils % 1 0 - 6 %    Basophils % 0 0 - 2 %    Neutrophils Absolute 2.9 2.0 - 7.7 thou/uL    Lymphocytes Absolute 2.3 0.8 - 4.4 thou/uL    Monocytes Absolute 0.4 0.0 - 0.9 thou/uL    Eosinophils Absolute 0.1 0.0 - 0.4 thou/uL    Basophils Absolute 0.0 0.0 - 0.2 thou/uL   Urinalysis-UC if Indicated   Result Value Ref Range    Color, UA Yellow Colorless, Yellow, Straw, Light Yellow    Clarity, UA Clear Clear    Glucose, UA Negative Negative    Bilirubin, UA Small (!) Negative    Ketones, UA Trace (!) Negative    Specific Gravity, UA 1.025 1.005 - 1.030    Blood, UA Negative Negative    pH, UA 6.0 5.0 - 8.0    Protein, UA Trace (!) Negative mg/dL    Urobilinogen, UA 0.2 E.U./dL 0.2 E.U./dL, 1.0 E.U./dL    Nitrite, UA Negative Negative    Leukocytes, UA Negative Negative    Bacteria, UA Few (!) None Seen hpf    RBC, UA None Seen None Seen, 0-2 hpf    WBC, UA None Seen None Seen, 0-5 hpf    Squam Epithel, UA 5-10 (!) None Seen, 0-5 lpf    Amorphous, UA Many (!) None Seen    Narrative    UC not indicated       Beta hCG is increasing, follow with the obstetrician as we discussed.    Please call with questions or contact us using MyChart.    Sincerely,        Electronically signed by Leon Meyers MD

## 2021-06-20 NOTE — LETTER
Letter by Leon Meyers MD at      Author: Leon Meyers MD Service: -- Author Type: --    Filed:  Encounter Date: 1/8/2020 Status: Signed                    My Depression Action Plan  Name: Armida Quiñonez   Date of Birth 1985  Date: 1/8/2020    My Doctor: Leon Meyers MD   My Clinic: [facility]  [address] [address]  [phone]          GREEN    ZONE   Good Control    What it looks like:     Things are going generally well. You have normal ups and downs. You may even feel depressed from time to time, but bad moods usually last less than a day.   What you need to do:  1. Continue to care for yourself (see self care plan)  2. Check your depression survival kit and update it as needed  3. Follow your physicians recommendations including any medication.  4. Do not stop taking medication unless you consult with your physician first.           YELLOW         ZONE Getting Worse    What it looks like:     Depression is starting to interfere with your life.     It may be hard to get out of bed; you may be starting to isolate yourself from others.    Symptoms of depression are starting to last most all day and this has happened for several days.     You may have suicidal thoughts but they are not constant.   What you need to do:     1. Call your care team, your response to treatment will improve if you keep your care team informed of your progress. Yellow periods are signs an adjustment may need to be made.     2. Continue your self-care, even if you have to fake it!    3. Talk to someone in your support network    4. Open up your depression survival kit           RED    ZONE Medical Alert - Get Help    What it looks like:     Depression is seriously interfering with your life.     You may experience these or other symptoms: You cant get out of bed most days, cant work or engage in other necessary activities, you have trouble taking care  of basic hygiene, or basic responsibilities, thoughts of suicide or death that will not go away, self-injurious behavior.     What you need to do:  1. Call your care team and request a same-day appointment. If they are not available (weekends or after hours) call your local crisis line, emergency room or 911.            Depression Self Care Plan / Survival Kit    Self-Care for Depression  Heres the deal. Your body and mind are really not as separate as most people think.  What you do and think affects how you feel and how you feel influences what you do and think. This means if you do things that people who feel good do, it will help you feel better.  Sometimes this is all it takes.  There is also a place for medication and therapy depending on how severe your depression is, so be sure to consult with your medical provider and/ or Behavioral Health Consultant if your symptoms are worsening or not improving.     In order to better manage my stress, I will:    Exercise  Get some form of exercise, every day. This will help reduce pain and release endorphins, the feel good chemicals in your brain. This is almost as good as taking antidepressants!  This is not the same as joining a gym and then never going! (they count on that by the way?) It can be as simple as just going for a walk or doing some gardening, anything that will get you moving.      Hygiene   Maintain good hygiene (Get out of bed in the morning, Make your bed, Brush your teeth, Take a shower, and Get dressed like you were going to work, even if you are unemployed).  If your clothes don't fit try to get ones that do.    Diet  I will strive to eat foods that are good for me, drink plenty of water, and avoid excessive sugar, caffeine, alcohol, and other mood-altering substances.  Some foods that are helpful in depression are: complex carbohydrates, B vitamins, flaxseed, fish or fish oil, fresh fruits and vegetables.    Psychotherapy  I agree to participate in  Individual Therapy (if recommended).    Medication  If prescribed medications, I agree to take them.  Missing doses can result in serious side effects.  I understand that drinking alcohol, or other illicit drug use, may cause potential side effects.  I will not stop my medication abruptly without first discussing it with my provider.    Staying Connected With Others  I will stay in touch with my friends, family members, and my primary care provider/team.    Use your imagination  Be creative.  We all have a creative side; it doesnt matter if its oil painting, sand castles, or mud pies! This will also kick up the endorphins.    Witness Beauty  (AKA stop and smell the roses) Take a look outside, even in mid-winter. Notice colors, textures. Watch the squirrels and birds.     Service to others  Be of service to others.  There is always someone else in need.  By helping others we can get out of ourselves and remember the really important things.  This also provides opportunities for practicing all the other parts of the program.    Humor  Laugh and be silly!  Adjust your TV habits for less news and crime-drama and more comedy.    Control your stress  Try breathing deep, massage therapy, biofeedback, and meditation. Find time to relax each day.     My support system    Clinic Contact:  Phone number:    Contact 1:  Phone number:    Contact 2:  Phone number:    Sabianism/:  Phone number:    Therapist:  Phone number:    [facility] crisis center:    Phone number:    Other community support:  Phone number:

## 2021-06-21 NOTE — LETTER
Letter by Esther Benavides, RN at      Author: Esther Benavides RN Service: -- Author Type: --    Filed:  Encounter Date: 1/18/2021 Status: (Other)       CARE COORDINATION  [facility] [address] [place] [address]    January 21, 2021    Armida Quiñonez  [address] [place] [address]      Dear Armida,    I am a clinic care coordinator who works with Leon Meyers MD I have been trying to reach you recently to introduce Clinic Care Coordination and to see if there was anything I could assist you with.  I recently tried to call and was unable to reach you. Below is a description of clinic care coordination and how I can further assist you.      The clinic care coordination team is made up of a registered nurse,  and community health worker who understand the health care system. The goal of clinic care coordination is to help you manage your health and improve access to the health care system in the most efficient manner. The team can assist you in meeting your health care goals by providing education, coordinating services, strengthening the communication among your providers and supporting you with any resource needs.    Please feel free to contact the Community Health Worker with any questions or concerns. We are focused on providing you with the highest-quality healthcare experience possible and that all starts with you.     Sincerely,     Esther Benavides      Patient Care Team       Relationship Specialty Notifications Start End    Leon Meyers MD PCP - General Family Medicine  10/15/19     Phone: [phone] Fax: [phone]         [address] [place] [id number]    Leon Meyers MD Assigned PCP   9/21/19     Phone: [phone] Fax: [phone]         [facility] [address] [address]

## 2021-06-21 NOTE — LETTER
Letter by Howie Cervantes MD at      Author: Howie Cervantes MD Service: -- Author Type: --    Filed:  Encounter Date: 2/2/2021 Status: (Other)       Dear Armida Quiñonez    Thank you for choosing [facility] for your care.  We are committed to providing you with the highest quality and compassionate healthcare services.  The following information pertains to your first appointment with our clinic.    Date/Time of appointment: Tuesday, 02/23/2021 at 10:45am    Note: Please arrive 30 minutes prior to your appointment time.  This allows time to complete forms, possible labs and nursing assessment.     Name of your Physician: Dr. Howie Cervantes    What to bring to your appointment:    Completed Patient History/Initial Nursing Assessment and Medication/Allergy List (these forms were sent to you).    Any paperwork or films from your physician that we have asked you to bring.    Your current insurance card(s).    Parking:    Please refer to the map included to direct you.  The [facility] is located at the [place] end of [facility] in [place], [place].      After turning onto [address] from [facility], take a right turn at the first stop sign.  We have designated parking on the left, identified as parking for Cancer Care patients (Lot D).     The Code to Enter Lot D is: 0201. This code changes monthly and will always coincide with the current month followed by 01. For example August will be 0801.  The month will continue to change but the 01 will remain constant.  If lot D is full please use Parking Lot A, directly across the street.    Please enter the Cancer Care Center on the north end of the [facility].  You will see a sign on the building.        For Medical Oncology or Hematology appointments, please take the elevator to the second floor to check in.   For Radiation Oncology appointments, please go straight through the double doors and check in.     Also please note appointments  can last 1.5-2 hours.      We hope these instructions are helpful to you.  If you have any questions or concerns, please call us at [phone].  It is our pleasure to assist you.    Warm Regards,  Patti Gallegos  Nurse Navigator  [phone]

## 2021-06-21 NOTE — LETTER
Letter by Howie Cervantes MD at      Author: Howie Cervantes MD Service: -- Author Type: --    Filed:  Encounter Date: 2/23/2021 Status: (Other)         02/23/21      Armida Quiñonez  [address] [place] [address]    Dear Armida,    We received a referral from Dr. King for you to be seen as a new patient in our [facility] with . We missed you at your appointment that was scheduled for Tuesday, February 23, 2021.    Our clinic records indicate we have attempted to contact you to reschedule a new patient appointment, but we have not heard back from you.  feels it is important for you to be seen in our clinic.    To prevent further delays in your care please contact our office at [phone] to schedule your new patient appointment.      Sincerely,    [facility]

## 2021-06-24 ENCOUNTER — NURSE TRIAGE (OUTPATIENT)
Dept: NURSING | Facility: CLINIC | Age: 36
End: 2021-06-24

## 2021-06-24 NOTE — TELEPHONE ENCOUNTER
Armida can't get into [facility] and is requesting lab results.  FNA relayed results to patient.    COVID 19 Nurse Triage Plan/Patient Instructions    Please be aware that novel coronavirus (COVID-19) may be circulating in the community. If you develop symptoms such as fever, cough, or SOB or if you have concerns about the presence of another infection including coronavirus (COVID-19), please contact your health care provider or visit [email or web address].     Disposition/Instructions    Home care recommended. Follow home care protocol based instructions.    Thank you for taking steps to prevent the spread of this virus.  o Limit your contact with others.  o Wear a simple mask to cover your cough.  o Wash your hands well and often.    Resources    [facility]: About COVID-19: [email or web address]    CDC: What to Do If You're Sick: www.cdc.gov/coronavirus/2019-ncov/about/steps-when-sick.html    CDC: Ending Home Isolation: www.cdc.gov/coronavirus/2019-ncov/hcp/disposition-in-home-patients.html     CDC: Caring for Someone: www.cdc.gov/coronavirus/2019-ncov/if-you-are-sick/care-for-someone.html     [facility]: Interim Guidance for Hospital Discharge to Home: www.health.[facility].[place]/diseases/coronavirus/hcp/hospdischarge.pdf    [facility] clinical trials (COVID-19 research studies): clinicalaffairs.[facility]-clinical-trials     Below are the COVID-19 hotlines at the [place] Department of Health ([facility]). Interpreters are available.   o For health questions: Call 863-092-4740 or 1-877.589.6622 (7 a.m. to 7 p.m.)  o For questions about schools and childcare: Call 001-673-4687 or 1-618.502.4137 (7 a.m. to 7 p.m.)                     Additional Information    Negative: Nursing judgment    Negative: Nursing judgment    Negative: Nursing judgment    Negative: Nursing judgment    Information only question and nurse able to answer    Protocols used: INFORMATION ONLY CALL - NO TRIAGE-A-[place]

## 2021-06-26 NOTE — PROGRESS NOTES
Subjective:      Armida Quiñonez is a 35 y.o. female with chief complaint of vaginal discharge.  1.  Vaginal discharge.  She has had discharge for 1 to 2 weeks.  Vaginal area feels uncomfortable.  Occasional mild pelvic discomfort or cramping.  Increased urinary frequency, no dysuria.    2.  Contraception management.  Not using anything for birth control right now.  She had weight gain on Depo-Provera.  She had other complications when she had an IUD.  She says she is not really sure if the IUD was associated, but she is hesitant to try this again.  She is thinking she may want a tubal ligation, but is not ready to commit to that now.  She had hypertension during pregnancy.  No history of blood clots in her body.  She is a smoker.  I reviewed other options for birth control including Nexplanon and birth control pills.  I discussed she would have an increased risk of blood clots with pills due to her other risk factors.  Would not recommend Ortho Evra patch at this time due to her weight.  She has had unprotected sex within the past 2 weeks.  She thinks she has infertility now.  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  We discussed there is a small chance she could be pregnant, but pregnancy test would be to be too early for pregnancy test to identify this.  I reviewed Nexplanon insertion should not adversely affect early pregnancy.  She would like Nexplanon placed today.  If she is concerned about possible pregnancy, she will take a pregnancy test either here or at home in 2 to 3 weeks.  She thinks she would like Nexplanon.  She thinks the 3 years of Nexplanon will give her enough time to probably commit to getting a tubal ligation.  Risks, benefits, possible side effects of Nexplanon were discussed with patient.  All of her questions were answered.  She would like to get Nexplanon today.    Previous health records requested through care everywhere.  I will review records and updated  chart and update chart as appropriate.    ACT = 15      She is a smoker.  She is interested in quitting smoking.  She notes she also smokes marijuana.    Patient Active Problem List   Diagnosis     Morbid obesity (H)     Mild persistent asthma, uncomplicated     DEMETRI (generalized anxiety disorder)     Dyshidrotic eczema     Bipolar 2 disorder (H)     Abnormal cervical Papanicolaou smear     Depression     Migraine     Recurrent pregnancy loss     Tobacco user     Uterine leiomyoma, unspecified location     Posttraumatic stress disorder     Nexplanon insertion (6/17/21)        Current Outpatient Medications:      acetaminophen (TYLENOL) 500 MG tablet, Take 1,000 mg by mouth., Disp: , Rfl:      albuterol (PROAIR HFA;PROVENTIL HFA;VENTOLIN HFA) 90 mcg/actuation inhaler, Inhale 2 puffs every 6 (six) hours as needed for wheezing., Disp: 1 each, Rfl: 2     betamethasone, augmented, (DIPROLENE-AF) 0.05 % cream, NICK BID TO HANDS AND ARMS ONLY PRN, Disp: 30 g, Rfl: 6     hydrocortisone 1 % cream, NICK EXT AA TID, Disp: , Rfl: 1     ibuprofen (ADVIL,MOTRIN) 600 MG tablet, Take 600 mg by mouth., Disp: , Rfl:      ketoconazole (NIZORAL) 2 % shampoo, Use once daily for 2 weeks, Disp: 120 mL, Rfl: 0     ondansetron (ZOFRAN-ODT) 4 MG disintegrating tablet, Take 4 mg by mouth., Disp: , Rfl:      ferrous sulfate 325 (65 FE) MG tablet, Take 1 tablet (325 mg total) by mouth daily., Disp: 30 tablet, Rfl: 3     metroNIDAZOLE (FLAGYL) 500 MG tablet, Take 1 tablet (500 mg total) by mouth 2 (two) times a day for 14 days., Disp: 28 tablet, Rfl: 0     PRENATAL VITAMIN 27 mg iron- 0.8 mg Tab tablet, Take 1 tablet by mouth daily., Disp: , Rfl: 3    Current Facility-Administered Medications:      lidocaine 1%-EPINEPHrine 1:100,000 1 %-1:100,000 injection 2.5 mL (XYLOCAINE W/EPI), 2.5 mL, Intradermal, Once, Sandie Silverman PA-C      Tobacco Use      Smoking status: Never Smoker      Smokeless tobacco: Never Used      Objective:      Allergies:  Latex    Vitals:    06/17/21 1114   BP: 115/75   Pulse: 80     Body mass index is 44.48 kg/m .    General: Alert and oriented x 3, in no apparent distress  Genitourinary: External genitalia is normal in appearance, vaginal walls are healthy, cervix is well visualized and normal in appearance, no significant discharge noted, Pap smear taken without difficulty    Procedure:  Left upper inner arm was adequately anesthetized with 2.5 cc of lidocaine with Epi.  Then, using sterile technique, Nexplanon was inserted and esther was deployed without difficulty.  Nexplanon esther was palpable subcutaneously by myself.  Insertion site was covered with a band-aid and area was wrapped with a pressure bandage.  Patient was neurovascularly intact after exam.  Appropriate wound aftercare was dicussed with patient.      Results for orders placed or performed in visit on 06/17/21   Wet Prep, Vaginal    Specimen: Genital   Result Value Ref Range    Yeast Result No yeast seen No yeast seen    Trichomonas No Trichomonas seen No Trichomonas seen    Clue Cells, Wet Prep Clue cells seen (!) No Clue cells seen   Urinalysis   Result Value Ref Range    Color, UA Yellow Colorless, Yellow, Straw, Light Yellow    Clarity, UA Clear Clear    Glucose, UA Negative Negative    Protein, UA Negative Negative    Bilirubin, UA Negative Negative    Urobilinogen, UA 0.2 E.U./dL 0.2 E.U./dL, 1.0 E.U./dL    pH, UA 6.5 5.0 - 8.0    Blood, UA Negative Negative    Ketones, UA Negative Negative    Nitrite, UA Negative Negative    Leukocytes, UA Negative Negative    Specific Gravity, UA 1.025 1.005 - 1.030   Pregnancy (Beta-hCG, Qual), Urine   Result Value Ref Range    Pregnancy Test, Urine Negative Negative   Other labs pending.    Assessment and Plan:     1. Vaginal discharge  Clue cells on wet prep today.  She was treated for BV.  I will follow-up with other labs.  - Wet Prep, Vaginal  - Chlamydia trachomatis by PCR  - Neisseria gonorrhoeae by PCR  -  metroNIDAZOLE (FLAGYL) 500 MG tablet; Take 1 tablet (500 mg total) by mouth 2 (two) times a day for 14 days.  Dispense: 28 tablet; Refill: 0    2. Nexplanon insertion  Insertion Date: 06/17/21  3 Year Expiration Date: 06/17/24  Consent form was reviewed with patient, signed, and will be scanned in to her chart.  She knows to use back-up birth control for the next 1 week.   - Pregnancy (Beta-hCG, Qual), Urine    3. Urine frequency  Urinalysis normal today.  I will follow-up with urine culture and treat as appropriate.  - Urinalysis  - Culture, Urine    4. Pap smear for cervical cancer screening  Pap RN to follow-up with results.  - Gynecologic Cytology (PAP Smear)    5.  Nicotine dependence.  She is very interested in quitting smoking.  Given other concerns today, this was not discussed in detail.  Readdress at future visit if she has not already quit.    This dictation uses voice recognition software, which may contain typographical errors.

## 2021-06-28 LAB

## 2021-06-29 ENCOUNTER — COMMUNICATION - HEALTHEAST (OUTPATIENT)
Dept: FAMILY MEDICINE | Facility: CLINIC | Age: 36
End: 2021-06-29

## 2021-07-04 NOTE — ADDENDUM NOTE
Addendum Note by Leon Meyers MD at 5/11/2021 11:40 AM     Author: Leon Meyers MD Service: -- Author Type: Physician    Filed: 5/13/2021  1:21 PM Encounter Date: 5/11/2021 Status: Signed    : Leon Meyers MD (Physician)    Addended by: LEON MEYERS on: 5/13/2021 01:21 PM        Modules accepted: Orders

## 2021-07-06 VITALS
SYSTOLIC BLOOD PRESSURE: 115 MMHG | HEIGHT: 65 IN | DIASTOLIC BLOOD PRESSURE: 75 MMHG | HEART RATE: 80 BPM | WEIGHT: 267 LBS | BODY MASS INDEX: 44.48 KG/M2

## 2021-07-07 NOTE — LETTER
Letter by Moriah Ramsay RN at      Author: Moriah Ramsay RN Service: -- Author Type: --    Filed:  Encounter Date: 6/29/2021 Status: (Other)         Armida Quiñonez  [address] [place] [address]  [address]  [place] [address]             June 29, 2021         Dear Ms. Quiñonez,    We are happy to inform you that your recent Pap smear and Human Papillomavirus (HPV) test results are normal and negative.    It is recommended that you have your next Pap smear and Human Papillomavirus (HPV) test in 5 years. You will also need to return to the clinic every year for an annual wellness visit.    If you have additional questions regarding this result, please contact our office and we will be happy to assist you.      Sincerely,    Your [facility] Care Team

## 2021-07-08 ASSESSMENT — ASTHMA QUESTIONNAIRES: ACT_TOTALSCORE: 15

## 2021-07-14 PROBLEM — F32.A DEPRESSIVE DISORDER: Status: RESOLVED | Noted: 2021-01-19 | Resolved: 2021-06-17

## 2021-07-26 PROBLEM — R87.619 ABNORMAL CERVICAL PAPANICOLAOU SMEAR: Status: ACTIVE | Noted: 2021-01-19

## 2021-07-26 PROBLEM — Z30.017 NEXPLANON INSERTION: Status: ACTIVE | Noted: 2021-06-17

## 2021-07-26 PROBLEM — F17.210 CIGARETTE NICOTINE DEPENDENCE WITHOUT COMPLICATION: Status: ACTIVE | Noted: 2021-06-17

## 2021-10-02 ENCOUNTER — HEALTH MAINTENANCE LETTER (OUTPATIENT)
Age: 36
End: 2021-10-02

## 2021-10-30 ENCOUNTER — TRANSFERRED RECORDS (OUTPATIENT)
Dept: HEALTH INFORMATION MANAGEMENT | Facility: CLINIC | Age: 36
End: 2021-10-30
Payer: COMMERCIAL

## 2021-11-24 ENCOUNTER — OFFICE VISIT (OUTPATIENT)
Dept: FAMILY MEDICINE | Facility: CLINIC | Age: 36
End: 2021-11-24
Payer: COMMERCIAL

## 2021-11-24 VITALS
SYSTOLIC BLOOD PRESSURE: 109 MMHG | BODY MASS INDEX: 43.48 KG/M2 | DIASTOLIC BLOOD PRESSURE: 70 MMHG | RESPIRATION RATE: 16 BRPM | WEIGHT: 261 LBS | HEART RATE: 83 BPM

## 2021-11-24 DIAGNOSIS — R10.84 ABDOMINAL PAIN, GENERALIZED: ICD-10-CM

## 2021-11-24 DIAGNOSIS — R10.2 PELVIC PAIN IN FEMALE: Primary | ICD-10-CM

## 2021-11-24 DIAGNOSIS — L30.1 DYSHIDROTIC ECZEMA: ICD-10-CM

## 2021-11-24 LAB
ALBUMIN SERPL-MCNC: 3.2 G/DL (ref 3.5–5)
ALBUMIN UR-MCNC: NEGATIVE MG/DL
ALP SERPL-CCNC: 85 U/L (ref 45–120)
ALT SERPL W P-5'-P-CCNC: 9 U/L (ref 0–45)
APPEARANCE UR: CLEAR
AST SERPL W P-5'-P-CCNC: 13 U/L (ref 0–40)
BACTERIA #/AREA URNS HPF: ABNORMAL /HPF
BILIRUB DIRECT SERPL-MCNC: 0.1 MG/DL
BILIRUB SERPL-MCNC: 0.4 MG/DL (ref 0–1)
BILIRUB UR QL STRIP: NEGATIVE
COLOR UR AUTO: YELLOW
ERYTHROCYTE [DISTWIDTH] IN BLOOD BY AUTOMATED COUNT: 12.9 % (ref 10–15)
GLUCOSE UR STRIP-MCNC: NEGATIVE MG/DL
HCG UR QL: NEGATIVE
HCT VFR BLD AUTO: 37 % (ref 35–47)
HGB BLD-MCNC: 12.2 G/DL (ref 11.7–15.7)
HGB UR QL STRIP: NEGATIVE
KETONES UR STRIP-MCNC: NEGATIVE MG/DL
LEUKOCYTE ESTERASE UR QL STRIP: NEGATIVE
LIPASE SERPL-CCNC: 27 U/L (ref 0–52)
MCH RBC QN AUTO: 28.2 PG (ref 26.5–33)
MCHC RBC AUTO-ENTMCNC: 33 G/DL (ref 31.5–36.5)
MCV RBC AUTO: 86 FL (ref 78–100)
NITRATE UR QL: NEGATIVE
PH UR STRIP: 6 [PH] (ref 5–8)
PLATELET # BLD AUTO: 269 10E3/UL (ref 150–450)
PROT SERPL-MCNC: 6.6 G/DL (ref 6–8)
RBC # BLD AUTO: 4.32 10E6/UL (ref 3.8–5.2)
RBC #/AREA URNS AUTO: ABNORMAL /HPF
SP GR UR STRIP: >=1.03 (ref 1–1.03)
SQUAMOUS #/AREA URNS AUTO: ABNORMAL /LPF
UROBILINOGEN UR STRIP-ACNC: 0.2 E.U./DL
WBC # BLD AUTO: 5.9 10E3/UL (ref 4–11)
WBC #/AREA URNS AUTO: ABNORMAL /HPF

## 2021-11-24 PROCEDURE — 91300 PR COVID VAC PFIZER DIL RECON 30 MCG/0.3 ML IM: CPT | Performed by: FAMILY MEDICINE

## 2021-11-24 PROCEDURE — 81025 URINE PREGNANCY TEST: CPT | Performed by: FAMILY MEDICINE

## 2021-11-24 PROCEDURE — 36415 COLL VENOUS BLD VENIPUNCTURE: CPT | Performed by: FAMILY MEDICINE

## 2021-11-24 PROCEDURE — 80076 HEPATIC FUNCTION PANEL: CPT | Performed by: FAMILY MEDICINE

## 2021-11-24 PROCEDURE — 99214 OFFICE O/P EST MOD 30 MIN: CPT | Mod: 25 | Performed by: FAMILY MEDICINE

## 2021-11-24 PROCEDURE — 87591 N.GONORRHOEAE DNA AMP PROB: CPT | Performed by: FAMILY MEDICINE

## 2021-11-24 PROCEDURE — 0001A PR COVID VAC PFIZER DIL RECON 30 MCG/0.3 ML IM: CPT | Performed by: FAMILY MEDICINE

## 2021-11-24 PROCEDURE — 85027 COMPLETE CBC AUTOMATED: CPT | Performed by: FAMILY MEDICINE

## 2021-11-24 PROCEDURE — 87491 CHLMYD TRACH DNA AMP PROBE: CPT | Performed by: FAMILY MEDICINE

## 2021-11-24 PROCEDURE — 81001 URINALYSIS AUTO W/SCOPE: CPT | Performed by: FAMILY MEDICINE

## 2021-11-24 PROCEDURE — 83690 ASSAY OF LIPASE: CPT | Performed by: FAMILY MEDICINE

## 2021-11-24 RX ORDER — KETOCONAZOLE 20 MG/ML
SHAMPOO TOPICAL
Qty: 120 ML | Refills: 1 | Status: SHIPPED | OUTPATIENT
Start: 2021-11-24 | End: 2023-06-12

## 2021-11-24 ASSESSMENT — ASTHMA QUESTIONNAIRES
QUESTION_4 LAST FOUR WEEKS HOW OFTEN HAVE YOU USED YOUR RESCUE INHALER OR NEBULIZER MEDICATION (SUCH AS ALBUTEROL): ONE OR TWO TIMES PER DAY
QUESTION_1 LAST FOUR WEEKS HOW MUCH OF THE TIME DID YOUR ASTHMA KEEP YOU FROM GETTING AS MUCH DONE AT WORK, SCHOOL OR AT HOME: MOST OF THE TIME
QUESTION_3 LAST FOUR WEEKS HOW OFTEN DID YOUR ASTHMA SYMPTOMS (WHEEZING, COUGHING, SHORTNESS OF BREATH, CHEST TIGHTNESS OR PAIN) WAKE YOU UP AT NIGHT OR EARLIER THAN USUAL IN THE MORNING: TWO OR THREE NIGHTS A WEEK
EMERGENCY_ROOM_LAST_YEAR_TOTAL: THREE OR MORE
QUESTION_2 LAST FOUR WEEKS HOW OFTEN HAVE YOU HAD SHORTNESS OF BREATH: ONCE A DAY
QUESTION_5 LAST FOUR WEEKS HOW WOULD YOU RATE YOUR ASTHMA CONTROL: POORLY CONTROLLED
ACT_TOTALSCORE: 10

## 2021-11-25 LAB
C TRACH DNA SPEC QL PROBE+SIG AMP: NEGATIVE
N GONORRHOEA DNA SPEC QL NAA+PROBE: NEGATIVE

## 2021-11-25 ASSESSMENT — PATIENT HEALTH QUESTIONNAIRE - PHQ9: SUM OF ALL RESPONSES TO PHQ QUESTIONS 1-9: 18

## 2021-11-25 ASSESSMENT — ASTHMA QUESTIONNAIRES: ACT_TOTALSCORE: 10
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
## 2021-12-15 ENCOUNTER — ALLIED HEALTH/NURSE VISIT (OUTPATIENT)
Dept: FAMILY MEDICINE | Facility: CLINIC | Age: 36
End: 2021-12-15
Payer: COMMERCIAL

## 2021-12-15 DIAGNOSIS — U07.1 COVID: Primary | ICD-10-CM

## 2021-12-15 PROCEDURE — 91300 PR COVID VAC PFIZER DIL RECON 30 MCG/0.3 ML IM: CPT

## 2021-12-15 PROCEDURE — 99207 PR NO CHARGE NURSE ONLY: CPT

## 2021-12-15 PROCEDURE — 0002A PR COVID VAC PFIZER DIL RECON 30 MCG/0.3 ML IM: CPT

## 2021-12-19 ENCOUNTER — TELEPHONE (OUTPATIENT)
Dept: NURSING | Facility: CLINIC | Age: 36
End: 2021-12-19
Payer: COMMERCIAL

## 2021-12-19 NOTE — TELEPHONE ENCOUNTER
Telephone call  Patient called to report that  she was having some covid symptoms. She states she thinks that her kids have covid and wanted to get tested.  She did not want to go thru a virtual appointment at this time. Assisted her in finding community testing.    Jonelle Dennis RN   [facility] Nurse Advisor  2:38 PM 12/19/2021    COVID 19 Nurse Triage Plan/Patient Instructions    Please be aware that novel coronavirus (COVID-19) may be circulating in the community. If you develop symptoms such as fever, cough, or SOB or if you have concerns about the presence of another infection including coronavirus (COVID-19), please contact your health care provider or visit [email or web address].     Disposition/Instructions    Virtual Visit with provider recommended. Reference Visit Selection Guide.    Thank you for taking steps to prevent the spread of this virus.  o Limit your contact with others.  o Wear a simple mask to cover your cough.  o Wash your hands well and often.    Resources    [facility]: About COVID-19: [email or web address]    CDC: What to Do If You're Sick: www.cdc.gov/coronavirus/2019-ncov/about/steps-when-sick.html    CDC: Ending Home Isolation: www.cdc.gov/coronavirus/2019-ncov/hcp/disposition-in-home-patients.html     CDC: Caring for Someone: www.cdc.gov/coronavirus/2019-ncov/if-you-are-sick/care-for-someone.html     [facility]: Interim Guidance for Hospital Discharge to Home: www.health.[facility].[place]/diseases/coronavirus/hcp/hospdischarge.pdf    [facility] clinical trials (COVID-19 research studies): clinicalaffairs.[email or web address]/n-clinical-trials     Below are the COVID-19 hotlines at the [facility] ([facility]). Interpreters are available.   o For health questions: Call 201-670-4247 or 1-901.404.4223 (7 a.m. to 7 p.m.)  o For questions about schools and childcare: Call 658-977-9095 or 1-201.225.7196 (7 a.m. to 7 p.m.)

## 2022-01-06 ENCOUNTER — TELEPHONE (OUTPATIENT)
Dept: FAMILY MEDICINE | Facility: CLINIC | Age: 37
End: 2022-01-06
Payer: COMMERCIAL

## 2022-01-06 NOTE — TELEPHONE ENCOUNTER
Reason for Call:  Medication or medication refill:    Do you use a [facility]?  Name of the pharmacy and phone number for the current request:  [facility]    Name of the medication requested: pt is calling to ask for her clonazapan to get refilled, she said she talked to the dr in november regarding this?    Other request:     Can we leave a detailed message on this number? YES    Phone number patient can be reached at: Home number on file [phone] (home)    Best Time: any    Call taken on 1/6/2022 at 3:06 PM by Michaela Marshall

## 2022-01-06 NOTE — TELEPHONE ENCOUNTER
Clonazepam is not on active med list. Per chart was stopped by patient and then officially discontinued on 5/11/16. Defer to prescriber for review. Thank you!

## 2022-01-10 ENCOUNTER — OFFICE VISIT (OUTPATIENT)
Dept: FAMILY MEDICINE | Facility: CLINIC | Age: 37
End: 2022-01-10
Payer: COMMERCIAL

## 2022-01-10 VITALS
DIASTOLIC BLOOD PRESSURE: 80 MMHG | HEART RATE: 80 BPM | SYSTOLIC BLOOD PRESSURE: 120 MMHG | WEIGHT: 254 LBS | BODY MASS INDEX: 42.32 KG/M2

## 2022-01-10 DIAGNOSIS — N89.8 VAGINAL IRRITATION: Primary | ICD-10-CM

## 2022-01-10 LAB
CLUE CELLS: ABNORMAL
TRICHOMONAS, WET PREP: ABNORMAL
WBC'S/HIGH POWER FIELD, WET PREP: ABNORMAL
YEAST, WET PREP: ABNORMAL

## 2022-01-10 PROCEDURE — 87591 N.GONORRHOEAE DNA AMP PROB: CPT | Performed by: FAMILY MEDICINE

## 2022-01-10 PROCEDURE — 87491 CHLMYD TRACH DNA AMP PROBE: CPT | Performed by: FAMILY MEDICINE

## 2022-01-10 PROCEDURE — 99213 OFFICE O/P EST LOW 20 MIN: CPT | Performed by: FAMILY MEDICINE

## 2022-01-10 PROCEDURE — 87210 SMEAR WET MOUNT SALINE/INK: CPT | Performed by: FAMILY MEDICINE

## 2022-01-10 NOTE — PROGRESS NOTES
Armida was seen today for vaginal problem.    Diagnoses and all orders for this visit:    Vaginal irritation: Wet prep came back negative but 2+ WBCs.  Awaiting GC/CH.  Suspect the irritation was from the latex condom.  And having an allergic reaction/contact dermatitis.  Will start triamcinolone twice a day as below.  Follow-up if no improvement with triamcinolone.  -     Wet prep - lab collect; Future  -     Chlamydia & Gonorrhea by PCR, [facility] - Clinic Collect  -     Wet prep - lab collect  -     triamcinolone (KENALOG) 0.1 % external cream; Apply topically 2 times daily To vulva 5-7 days until resolution      Subjective    Armida is a 36 year old who presents for the following health issues     HPI   3 days  Ran out of non latex condoms  Has to use a latex condom  Was using the non latex first. And used the latex once  Itchy rashy  Hasn't noticed any discharge.     Review of Systems   Constitutional, HEENT, cardiovascular, pulmonary, gi and gu systems are negative, except as otherwise noted.      Objective    /80   Pulse 80   Wt 115.2 kg (254 lb)   LMP 01/06/2022   Breastfeeding No   BMI 42.32 kg/m    Body mass index is 42.32 kg/m .  Physical Exam   GENERAL: healthy, alert and no distress   (female): normal female external genitalia, normal urethral meatus, vaginal mucosa, normal cervix.  A small amount of white vaginal discharge by the cervix  MS: no gross musculoskeletal defects noted, no edema

## 2022-01-11 LAB
C TRACH DNA SPEC QL PROBE+SIG AMP: NEGATIVE
N GONORRHOEA DNA SPEC QL NAA+PROBE: NEGATIVE

## 2022-01-11 RX ORDER — TRIAMCINOLONE ACETONIDE 1 MG/G
CREAM TOPICAL 2 TIMES DAILY
Qty: 45 G | Refills: 0 | Status: SHIPPED | OUTPATIENT
Start: 2022-01-11 | End: 2022-03-04

## 2022-01-11 NOTE — TELEPHONE ENCOUNTER
We did not discuss that medication only her vaginal bleeding concerns which I am still awaiting an ultrasound result for. If she needs to restart that medication she should see PCP since it has been discontinued for several years now.

## 2022-01-11 NOTE — TELEPHONE ENCOUNTER
Patient notified per MD note below. The patient verbalizes understanding of provider/CSS instructions for follow-up and continued care per provider message.     Patient wants to establish care with a female provider @ [facility] - she does NOT have a primary care provider anywhere else.     Please call patient and book with female provider who is accepting new patients.

## 2022-01-12 ENCOUNTER — TELEPHONE (OUTPATIENT)
Dept: FAMILY MEDICINE | Facility: CLINIC | Age: 37
End: 2022-01-12
Payer: COMMERCIAL

## 2022-01-17 ENCOUNTER — OFFICE VISIT (OUTPATIENT)
Dept: FAMILY MEDICINE | Facility: CLINIC | Age: 37
End: 2022-01-17
Payer: COMMERCIAL

## 2022-01-17 VITALS
HEIGHT: 64 IN | BODY MASS INDEX: 42.98 KG/M2 | OXYGEN SATURATION: 99 % | DIASTOLIC BLOOD PRESSURE: 72 MMHG | SYSTOLIC BLOOD PRESSURE: 116 MMHG | TEMPERATURE: 98.2 F | WEIGHT: 251.75 LBS | HEART RATE: 85 BPM

## 2022-01-17 DIAGNOSIS — Z00.00 ADULT GENERAL MEDICAL EXAM: Primary | ICD-10-CM

## 2022-01-17 DIAGNOSIS — L65.9 HAIR LOSS: ICD-10-CM

## 2022-01-17 DIAGNOSIS — F41.1 GENERALIZED ANXIETY DISORDER: ICD-10-CM

## 2022-01-17 LAB
ALBUMIN SERPL-MCNC: 3.2 G/DL (ref 3.5–5)
ALP SERPL-CCNC: 85 U/L (ref 45–120)
ALT SERPL W P-5'-P-CCNC: 24 U/L (ref 0–45)
ANION GAP SERPL CALCULATED.3IONS-SCNC: 10 MMOL/L (ref 5–18)
AST SERPL W P-5'-P-CCNC: 61 U/L (ref 0–40)
BILIRUB SERPL-MCNC: 0.8 MG/DL (ref 0–1)
BUN SERPL-MCNC: 8 MG/DL (ref 8–22)
CALCIUM SERPL-MCNC: 9.2 MG/DL (ref 8.5–10.5)
CHLORIDE BLD-SCNC: 106 MMOL/L (ref 98–107)
CO2 SERPL-SCNC: 22 MMOL/L (ref 22–31)
CREAT SERPL-MCNC: 0.68 MG/DL (ref 0.6–1.1)
ERYTHROCYTE [DISTWIDTH] IN BLOOD BY AUTOMATED COUNT: 12.8 % (ref 10–15)
GFR SERPL CREATININE-BSD FRML MDRD: >90 ML/MIN/1.73M2
GLUCOSE BLD-MCNC: 90 MG/DL (ref 70–125)
HCT VFR BLD AUTO: 39.2 % (ref 35–47)
HGB BLD-MCNC: 13 G/DL (ref 11.7–15.7)
MCH RBC QN AUTO: 28 PG (ref 26.5–33)
MCHC RBC AUTO-ENTMCNC: 33.2 G/DL (ref 31.5–36.5)
MCV RBC AUTO: 84 FL (ref 78–100)
PLATELET # BLD AUTO: 300 10E3/UL (ref 150–450)
POTASSIUM BLD-SCNC: 4 MMOL/L (ref 3.5–5)
PROT SERPL-MCNC: 7.5 G/DL (ref 6–8)
RBC # BLD AUTO: 4.65 10E6/UL (ref 3.8–5.2)
SODIUM SERPL-SCNC: 138 MMOL/L (ref 136–145)
TSH SERPL DL<=0.005 MIU/L-ACNC: 1.28 UIU/ML (ref 0.3–5)
WBC # BLD AUTO: 6.4 10E3/UL (ref 4–11)

## 2022-01-17 PROCEDURE — 99214 OFFICE O/P EST MOD 30 MIN: CPT | Mod: 25 | Performed by: FAMILY MEDICINE

## 2022-01-17 PROCEDURE — 85027 COMPLETE CBC AUTOMATED: CPT | Performed by: FAMILY MEDICINE

## 2022-01-17 PROCEDURE — 36415 COLL VENOUS BLD VENIPUNCTURE: CPT | Performed by: FAMILY MEDICINE

## 2022-01-17 PROCEDURE — 80053 COMPREHEN METABOLIC PANEL: CPT | Performed by: FAMILY MEDICINE

## 2022-01-17 PROCEDURE — 84443 ASSAY THYROID STIM HORMONE: CPT | Performed by: FAMILY MEDICINE

## 2022-01-17 PROCEDURE — 99395 PREV VISIT EST AGE 18-39: CPT | Performed by: FAMILY MEDICINE

## 2022-01-17 RX ORDER — GABAPENTIN 100 MG/1
100 CAPSULE ORAL 3 TIMES DAILY
Qty: 90 CAPSULE | Refills: 1 | Status: SHIPPED | OUTPATIENT
Start: 2022-01-17 | End: 2022-11-23

## 2022-01-17 ASSESSMENT — MIFFLIN-ST. JEOR: SCORE: 1823.44
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
## 2022-01-21 ASSESSMENT — ANXIETY QUESTIONNAIRES
2. NOT BEING ABLE TO STOP OR CONTROL WORRYING: NEARLY EVERY DAY
1. FEELING NERVOUS, ANXIOUS, OR ON EDGE: NEARLY EVERY DAY
GAD7 TOTAL SCORE: 21
6. BECOMING EASILY ANNOYED OR IRRITABLE: NEARLY EVERY DAY
3. WORRYING TOO MUCH ABOUT DIFFERENT THINGS: NEARLY EVERY DAY
IF YOU CHECKED OFF ANY PROBLEMS ON THIS QUESTIONNAIRE, HOW DIFFICULT HAVE THESE PROBLEMS MADE IT FOR YOU TO DO YOUR WORK, TAKE CARE OF THINGS AT HOME, OR GET ALONG WITH OTHER PEOPLE: VERY DIFFICULT
5. BEING SO RESTLESS THAT IT IS HARD TO SIT STILL: NEARLY EVERY DAY
7. FEELING AFRAID AS IF SOMETHING AWFUL MIGHT HAPPEN: NEARLY EVERY DAY

## 2022-01-21 ASSESSMENT — PATIENT HEALTH QUESTIONNAIRE - PHQ9: 5. POOR APPETITE OR OVEREATING: NEARLY EVERY DAY

## 2022-01-22 ASSESSMENT — ANXIETY QUESTIONNAIRES: GAD7 TOTAL SCORE: 21

## 2022-01-23 ASSESSMENT — ENCOUNTER SYMPTOMS
NERVOUS/ANXIOUS: 1
ROS SKIN COMMENTS: HAIR LOSS
SHORTNESS OF BREATH: 1

## 2022-01-29 ENCOUNTER — OFFICE VISIT (OUTPATIENT)
Dept: URGENT CARE | Facility: URGENT CARE | Age: 37
End: 2022-01-29
Payer: COMMERCIAL

## 2022-01-29 VITALS
DIASTOLIC BLOOD PRESSURE: 65 MMHG | BODY MASS INDEX: 45 KG/M2 | HEART RATE: 81 BPM | WEIGHT: 254 LBS | OXYGEN SATURATION: 98 % | HEIGHT: 63 IN | SYSTOLIC BLOOD PRESSURE: 104 MMHG

## 2022-01-29 DIAGNOSIS — Z11.3 SCREEN FOR STD (SEXUALLY TRANSMITTED DISEASE): Primary | ICD-10-CM

## 2022-01-29 LAB

## 2022-01-29 PROCEDURE — 87491 CHLMYD TRACH DNA AMP PROBE: CPT | Performed by: PHYSICIAN ASSISTANT

## 2022-01-29 PROCEDURE — 99213 OFFICE O/P EST LOW 20 MIN: CPT | Performed by: PHYSICIAN ASSISTANT

## 2022-01-29 PROCEDURE — 81003 URINALYSIS AUTO W/O SCOPE: CPT

## 2022-01-29 PROCEDURE — 87210 SMEAR WET MOUNT SALINE/INK: CPT

## 2022-01-29 PROCEDURE — 87591 N.GONORRHOEAE DNA AMP PROB: CPT | Performed by: PHYSICIAN ASSISTANT

## 2022-01-29 ASSESSMENT — MIFFLIN-ST. JEOR: SCORE: 1821.27
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
## 2022-01-31 LAB
C TRACH DNA SPEC QL NAA+PROBE: NEGATIVE
N GONORRHOEA DNA SPEC QL NAA+PROBE: NEGATIVE

## 2022-02-28 ENCOUNTER — NURSE TRIAGE (OUTPATIENT)
Dept: NURSING | Facility: CLINIC | Age: 37
End: 2022-02-28
Payer: COMMERCIAL

## 2022-02-28 NOTE — TELEPHONE ENCOUNTER
"Patient calling with issues affecting head and nose.  Patient states she has been having frequent nosebleeds for the past couple weeks.  Patient is able to control bleeding within 1-3 minutes.  Patient also states she is having right-sided \"head swelling\", especially behind her ear. (near hairline)  Patient reports that she shaved entire head, because her \"hair was uncomfortable\".  Prior to shaving head, patient stated her hair was falling out.  Also states that she will occasionally have right-sided facial numbness, so she \"punches her head\".  Writer explained in the future to avoid hitting face/head, as she could do unintentional harm.  Disposition is home care. (although patient has appt 3-3-22)  Patient verbalized understanding and agrees with plan.     Miriam Rebolledo RN  [facility] Nurse Advisor  11:49 AM 2/28/2022       Reason for Disposition    Similar to previously diagnosed muscle-tension headaches    Additional Information    Negative: Difficult to awaken or acting confused (e.g., disoriented, slurred speech)    Negative: Weakness of the face, arm or leg on one side of the body and new onset    Negative: Numbness of the face, arm or leg on one side of the body and new onset    Negative: Loss of speech or garbled speech and new onset    Negative: Passed out (i.e., fainted, collapsed and was not responding)    Negative: Sounds like a life-threatening emergency to the triager    Negative: Followed a head injury within last 3 days    Negative: Traumatic Brain Injury (TBI) is suspected    Negative: Sinus pain of forehead and yellow or green nasal discharge    Negative: Pregnant    Negative: Unable to walk without falling    Negative: Stiff neck (can't touch chin to chest)    Negative: Possibility of carbon monoxide exposure    Negative: SEVERE headache, states 'worst headache' of life    Negative: SEVERE headache, sudden-onset (i.e., reaching maximum intensity within seconds to 1 hour)    Negative: Severe " pain in one eye    Negative: Loss of vision or double vision (Exception: same as prior migraines)    Negative: Patient sounds very sick or weak to the triager    Negative: Fever > 103 F (39.4 C)    Negative: Fever > 100.0 F (37.8 C) and has diabetes mellitus or a weak immune system (e.g., HIV positive, cancer chemotherapy, organ transplant, splenectomy, chronic steroids)    Negative: SEVERE headache (e.g., excruciating) and has had severe headaches before    Negative: SEVERE headache and not relieved by pain meds    Negative: SEVERE headache and vomiting    Negative: SEVERE headache and fever    Negative: New headache and weak immune system (e.g., HIV positive, cancer chemo, splenectomy, organ transplant, chronic steroids)    Negative: Fever present > 3 days (72 hours)    Negative: Patient wants to be seen    Negative: New headache and age > 50    Negative: Unexplained headache that is present > 24 hours    Negative: Headache started during exertion (e.g., sex, strenuous exercise, heavy lifting)    Negative: Headache is a chronic symptom (recurrent or ongoing AND lasting > 4 weeks)    Negative: Mild-moderate headache    Negative: Similar to previously diagnosed migraine headaches    Protocols used: HEADACHE-A-[place]  COVID 19 Nurse Triage Plan/Patient Instructions    Please be aware that novel coronavirus (COVID-19) may be circulating in the community. If you develop symptoms such as fever, cough, or SOB or if you have concerns about the presence of another infection including coronavirus (COVID-19), please contact your health care provider or visit [email or web address].     Disposition/Instructions    Home care recommended. Follow home care protocol based instructions.    Thank you for taking steps to prevent the spread of this virus.  o Limit your contact with others.  o Wear a simple mask to cover your cough.  o Wash your hands well and often.    Resources    [facility]: About COVID-19:  [email or web address]    CDC: What to Do If You're Sick: www.cdc.gov/coronavirus/2019-ncov/about/steps-when-sick.html    CDC: Ending Home Isolation: www.cdc.gov/coronavirus/2019-ncov/hcp/disposition-in-home-patients.html     CDC: Caring for Someone: www.cdc.gov/coronavirus/2019-ncov/if-you-are-sick/care-for-someone.html     [facility]: Interim Guidance for Hospital Discharge to Home: www.[email or web address].mn./diseases/coronavirus/hcp/hospdischarge.pdf    [facility] clinical trials (COVID-19 research studies): clinicalaffairs.[email or web address]/[employer]-clinical-trials     Below are the COVID-19 hotlines at the [place] [facility] ([facility]). Interpreters are available.   o For health questions: Call 499-084-3672 or 1-768.326.3877 (7 a.m. to 7 p.m.)  o For questions about schools and childcare: Call 661-123-9831 or 1-802.354.3479 (7 a.m. to 7 p.m.)

## 2022-03-03 ENCOUNTER — OFFICE VISIT (OUTPATIENT)
Dept: FAMILY MEDICINE | Facility: CLINIC | Age: 37
End: 2022-03-03
Payer: COMMERCIAL

## 2022-03-03 VITALS
HEART RATE: 65 BPM | BODY MASS INDEX: 45.88 KG/M2 | OXYGEN SATURATION: 100 % | SYSTOLIC BLOOD PRESSURE: 114 MMHG | TEMPERATURE: 98.2 F | DIASTOLIC BLOOD PRESSURE: 75 MMHG | WEIGHT: 259 LBS

## 2022-03-03 DIAGNOSIS — F33.0 MILD RECURRENT MAJOR DEPRESSION (H): ICD-10-CM

## 2022-03-03 DIAGNOSIS — J34.89 NASAL DRYNESS: ICD-10-CM

## 2022-03-03 DIAGNOSIS — R10.13 ABDOMINAL PAIN, EPIGASTRIC: Primary | ICD-10-CM

## 2022-03-03 DIAGNOSIS — E66.01 MORBID OBESITY (H): ICD-10-CM

## 2022-03-03 DIAGNOSIS — L65.9 HAIR LOSS: ICD-10-CM

## 2022-03-03 DIAGNOSIS — R76.8 ELEVATED ANTINUCLEAR ANTIBODY (ANA) LEVEL: ICD-10-CM

## 2022-03-03 DIAGNOSIS — R21 RASH: ICD-10-CM

## 2022-03-03 PROBLEM — D64.9 ANEMIA: Status: ACTIVE | Noted: 2021-01-20

## 2022-03-03 PROBLEM — Z72.0 TOBACCO USER: Status: ACTIVE | Noted: 2021-01-19

## 2022-03-03 PROBLEM — G43.909 MIGRAINE: Status: ACTIVE | Noted: 2021-01-20

## 2022-03-03 PROBLEM — Z87.19 HISTORY OF APPENDICITIS: Status: ACTIVE | Noted: 2021-01-20

## 2022-03-03 LAB
ALBUMIN SERPL-MCNC: 3.4 G/DL (ref 3.5–5)
ALP SERPL-CCNC: 83 U/L (ref 45–120)
ALT SERPL W P-5'-P-CCNC: 10 U/L (ref 0–45)
ANION GAP SERPL CALCULATED.3IONS-SCNC: 12 MMOL/L (ref 5–18)
AST SERPL W P-5'-P-CCNC: 13 U/L (ref 0–40)
BILIRUB SERPL-MCNC: 0.5 MG/DL (ref 0–1)
BUN SERPL-MCNC: 10 MG/DL (ref 8–22)
C REACTIVE PROTEIN LHE: 0.5 MG/DL (ref 0–0.8)
CALCIUM SERPL-MCNC: 9.1 MG/DL (ref 8.5–10.5)
CCP AB SER IA-ACNC: <0.5 U/ML
CHLORIDE BLD-SCNC: 105 MMOL/L (ref 98–107)
CO2 SERPL-SCNC: 21 MMOL/L (ref 22–31)
CREAT SERPL-MCNC: 0.68 MG/DL (ref 0.6–1.1)
ERYTHROCYTE [SEDIMENTATION RATE] IN BLOOD BY WESTERGREN METHOD: 48 MM/HR (ref 0–20)
GFR SERPL CREATININE-BSD FRML MDRD: >90 ML/MIN/1.73M2
GLUCOSE BLD-MCNC: 92 MG/DL (ref 70–125)
POTASSIUM BLD-SCNC: 4.3 MMOL/L (ref 3.5–5)
PROT SERPL-MCNC: 7.2 G/DL (ref 6–8)
SODIUM SERPL-SCNC: 138 MMOL/L (ref 136–145)

## 2022-03-03 PROCEDURE — 86200 CCP ANTIBODY: CPT | Performed by: FAMILY MEDICINE

## 2022-03-03 PROCEDURE — 86039 ANTINUCLEAR ANTIBODIES (ANA): CPT | Performed by: FAMILY MEDICINE

## 2022-03-03 PROCEDURE — 86038 ANTINUCLEAR ANTIBODIES: CPT | Performed by: FAMILY MEDICINE

## 2022-03-03 PROCEDURE — 80053 COMPREHEN METABOLIC PANEL: CPT | Performed by: FAMILY MEDICINE

## 2022-03-03 PROCEDURE — 86780 TREPONEMA PALLIDUM: CPT | Performed by: FAMILY MEDICINE

## 2022-03-03 PROCEDURE — 36415 COLL VENOUS BLD VENIPUNCTURE: CPT | Performed by: FAMILY MEDICINE

## 2022-03-03 PROCEDURE — 86140 C-REACTIVE PROTEIN: CPT | Performed by: FAMILY MEDICINE

## 2022-03-03 PROCEDURE — 99214 OFFICE O/P EST MOD 30 MIN: CPT | Performed by: FAMILY MEDICINE

## 2022-03-03 PROCEDURE — 85652 RBC SED RATE AUTOMATED: CPT | Performed by: FAMILY MEDICINE

## 2022-03-03 RX ORDER — KETOCONAZOLE 20 MG/ML
SHAMPOO TOPICAL DAILY PRN
Qty: 100 ML | Refills: 0 | Status: SHIPPED | OUTPATIENT
Start: 2022-03-03 | End: 2022-05-03

## 2022-03-03 ASSESSMENT — ASTHMA QUESTIONNAIRES
QUESTION_4 LAST FOUR WEEKS HOW OFTEN HAVE YOU USED YOUR RESCUE INHALER OR NEBULIZER MEDICATION (SUCH AS ALBUTEROL): ONCE A WEEK OR LESS
QUESTION_1 LAST FOUR WEEKS HOW MUCH OF THE TIME DID YOUR ASTHMA KEEP YOU FROM GETTING AS MUCH DONE AT WORK, SCHOOL OR AT HOME: A LITTLE OF THE TIME
ACT_TOTALSCORE: 20
QUESTION_5 LAST FOUR WEEKS HOW WOULD YOU RATE YOUR ASTHMA CONTROL: WELL CONTROLLED
ACT_TOTALSCORE: 20
QUESTION_3 LAST FOUR WEEKS HOW OFTEN DID YOUR ASTHMA SYMPTOMS (WHEEZING, COUGHING, SHORTNESS OF BREATH, CHEST TIGHTNESS OR PAIN) WAKE YOU UP AT NIGHT OR EARLIER THAN USUAL IN THE MORNING: ONCE OR TWICE
QUESTION_2 LAST FOUR WEEKS HOW OFTEN HAVE YOU HAD SHORTNESS OF BREATH: ONCE OR TWICE A WEEK

## 2022-03-03 NOTE — PROGRESS NOTES
OFFICE VISIT - FAMILY MEDICINE     ASSESSMENT AND PLAN       ICD-10-CM    1. Abdominal pain, epigastric  R10.13    2. Nasal dryness  J34.89    3. Hair loss  L65.9 Adult Dermatology Referral     ketoconazole (NIZORAL) 2 % external shampoo   4. Rash  R21 **Comprehensive metabolic panel FUTURE 2mo     Cyclic Citrullinated Peptide Antibody IgG     CRP inflammation     Erythrocyte sedimentation rate auto     Anti Nuclear Reina IgG by IFA with Reflex     Treponema Abs w Reflex to RPR and Titer     Adult Dermatology Referral     Cyclic Citrullinated Peptide Antibody IgG     CRP inflammation     Erythrocyte sedimentation rate auto     Anti Nuclear Reina IgG by IFA with Reflex     Treponema Abs w Reflex to RPR and Titer     **Comprehensive metabolic panel FUTURE 2mo   5. Mild recurrent major depression (H)  F33.0    6. Morbid obesity (H)  E66.01       1. Hair loss and rash  Given continued hair loss and scalp irritation despite ketoconazole shampoo, dermatology referral provided. Will do lab work to assess for rheum/inflammatory etiology given hypoalbuminemia, recurrent rash, hair loss, and swelling. Palmar rash has not responded to past steroid treatment, so will wait on further treatment until sees derm. Will do syphilis screening.    2. Recurrent nose bleeds  Patient appears to have dry mucosa. Advised to place Vaseline or bacitracin in nares. Consider using a humidifier at night if apartment continues to be hot and dry.     3. Birth control  Patient reports cramping and pain with nexplanon and issues with an IUD and depo shot in the past. She is sure she does not want any more children. She has 3 children with the youngest age 14. She was previously seen by the OBGYN, advised to make an appointment to discuss options. Possibly consider tubal ligation for definitive contraceptive option.    Patient also wanted to get her COVID-19 booster today, but second dose was given last December, we discussed it is  too early, will plan  "on doing that in late April,  early May.    H/O depression, but no recurrence, will continue to monitor.  CHIEF COMPLAINT   Nose Bleeds and Sinus Problem       HPI   Armidadov Quiñonez is a 36 year old female who presents with hair loss. The patient reports years of scalp pain and itch with hair loss. She notes that her right scalp feels swollen and her right eye feels \"heavy\" without vision change. She has continued to use prescribed ketoconazole shampoo which has not helped. No rash on face. There is a rash on her bilateral palms that has not improved with steroid creme and has been pustular and bleeds. Her hands and feet sometimes swell as well. She also notes morning and night nose bleeds with dryness and pain in her nose and throat. She notes using the heat a lot in her apartment, but tries to drink a lot of water to stay hydrated. The nosebleeds are happening multiple times a week for the past 3 weeks. She also has trouble and painful cramping with her nexplanon implant. She did not have menstrual bleeding last cycle but she had severe cramping. She does not want any more children and has tried multiple BC in the past have been a problem for her including an IUD and the depo shot. She is open to definitive management with tubal ligation.       Review of Systems As per HPI, otherwise negative.    OBJECTIVE   /75   Pulse 65   Temp 98.2  F (36.8  C)   Wt 117.5 kg (259 lb)   SpO2 100%   BMI 45.88 kg/m    Physical Exam  Constitutional:       General: She is not in acute distress.     Appearance: Normal appearance.   HENT:      Head: Atraumatic.      Comments: Hair loss noted on anterior scalp and some other dispersed areas on the head. No rash noted     Right Ear: External ear normal.      Left Ear: External ear normal.      Nose:      Comments: Dryness in nares bilaterally. R nostril with irritation and erythema     Mouth/Throat:      Pharynx: Oropharynx is clear. No oropharyngeal exudate or posterior " oropharyngeal erythema.   Eyes:      Extraocular Movements: Extraocular movements intact.      Conjunctiva/sclera: Conjunctivae normal.   Neck:      Comments: No thyroid enlargement or nodules  Cardiovascular:      Rate and Rhythm: Normal rate and regular rhythm.      Heart sounds: Normal heart sounds.   Pulmonary:      Effort: Pulmonary effort is normal.      Breath sounds: Normal breath sounds.   Abdominal:      General: There is no distension.      Palpations: Abdomen is soft.   Musculoskeletal:         General: No swelling. Normal range of motion.      Cervical back: Normal range of motion and neck supple. No tenderness.   Lymphadenopathy:      Cervical: No cervical adenopathy.   Skin:     General: Skin is warm.      Comments: Bilateral palmar maculopapular rash   Neurological:      General: No focal deficit present.      Mental Status: She is oriented to person, place, and time.      Motor: No weakness.   Psychiatric:         Mood and Affect: Mood normal.         Behavior: Behavior normal.         PFSH     Family History   Problem Relation Age of Onset     Cancer Mother      Cancer Maternal Grandmother      Unknown/Adopted No family hx of      Diabetes No family hx of      Hypertension No family hx of      Cerebrovascular Disease No family hx of      Thyroid Disease No family hx of      Glaucoma No family hx of      Macular Degeneration No family hx of      Social History     Socioeconomic History     Marital status:      Spouse name: partner- Rex     Number of children: 3     Years of education: Not on file     Highest education level: Not on file   Occupational History     Occupation: homemaker   Tobacco Use     Smoking status: Never Smoker     Smokeless tobacco: Never Used     Tobacco comment: 7 cigs a week   Substance and Sexual Activity     Alcohol use: Yes     Alcohol/week: 0.0 standard drinks     Comment: Alcoholic Drinks/day: 3 drinks per month     Drug use: Yes     Types: Marijuana      Sexual activity: Not Currently     Partners: Male     Birth control/protection: None   Other Topics Concern     Parent/sibling w/ CABG, MI or angioplasty before 65F 55M? Not Asked   Social History Narrative     Not on file     Social Determinants of Health     Financial Resource Strain: Not on file   Food Insecurity: Not on file   Transportation Needs: Not on file   Physical Activity: Not on file   Stress: Not on file   Social Connections: Not on file   Intimate Partner Violence: Not on file   Housing Stability: Not on file       PMSH   [unfilled]  Past Surgical History:   Procedure Laterality Date     DILATION AND CURETTAGE SUCTION, TREAT INCOMPLETE        HC COLP CERVIX/UPPER VAGINA W BX CERVIX      neg     HC INSERTION INTRAUTERINE DEVICE      Mirena     HC LAPAROSCOPY, SURGICAL; APPENDECTOMY       HC REMOVE INTRAUTERINE DEVICE         RESULTS/CONSULTS (Lab/Rad)     Recent Results (from the past 168 hour(s))   Cyclic Citrullinated Peptide Antibody IgG   Result Value Ref Range    Cyclic Citrullinated Peptide Antibody IgG <0.5 <=4.9 U/mL   CRP inflammation   Result Value Ref Range    CRP 0.5 0.0-<0.8 mg/dL   Erythrocyte sedimentation rate auto   Result Value Ref Range    Erythrocyte Sedimentation Rate 48 (H) 0 - 20 mm/hr   Treponema Abs w Reflex to RPR and Titer   Result Value Ref Range    Treponema Antibody Total Nonreactive Nonreactive   **Comprehensive metabolic panel FUTURE 2mo   Result Value Ref Range    Sodium 138 136 - 145 mmol/L    Potassium 4.3 3.5 - 5.0 mmol/L    Chloride 105 98 - 107 mmol/L    Carbon Dioxide (CO2) 21 (L) 22 - 31 mmol/L    Anion Gap 12 5 - 18 mmol/L    Urea Nitrogen 10 8 - 22 mg/dL    Creatinine 0.68 0.60 - 1.10 mg/dL    Calcium 9.1 8.5 - 10.5 mg/dL    Glucose 92 70 - 125 mg/dL    Alkaline Phosphatase 83 45 - 120 U/L    AST 13 0 - 40 U/L    ALT 10 0 - 45 U/L    Protein Total 7.2 6.0 - 8.0 g/dL    Albumin 3.4 (L) 3.5 - 5.0 g/dL    Bilirubin Total 0.5 0.0 - 1.0  mg/dL    GFR Estimate >90 >60 mL/min/1.73m2        [unfilled]    HEALTH MAINTENANCE / SCREENING   [unfilled], [unfilled],[unfilled]  Immunization History   Administered Date(s) Administered     COVID-19,PF,Pfizer (12+ Yrs) 11/24/2021, 12/15/2021     DTaP, Unspecified 05/02/2012     HPV 03/13/2010, 06/17/2010, 05/02/2012     HPV Quadrivalent 03/13/2010, 06/17/2010, 05/02/2012     HepB-Adult 12/08/2020, 01/19/2021     Influenza (IIV3) PF 10/15/2009, 06/25/2011     Influenza Vaccine IM > 6 months Valent IIV4 (Alfuria,Fluzone) 10/07/2013, 11/07/2016, 10/22/2018     TDAP Vaccine (Adacel) 05/02/2012     Health Maintenance   Topic     ADVANCE CARE PLANNING      Pneumococcal Vaccine: Pediatrics (0 to 5 Years) and At-Risk Patients (6 to 64 Years) (1 of 2 - PPSV23)     EYE EXAM      ASTHMA ACTION PLAN      INFLUENZA VACCINE (1)     DTAP/TDAP/TD IMMUNIZATION (2 - Tdap)     COVID-19 Vaccine (3 - Booster for Pfizer series)     ASTHMA CONTROL TEST      PREVENTIVE CARE VISIT      ANNUAL REVIEW OF HM ORDERS      HPV TEST      PAP      HEPATITIS C SCREENING      HIV SCREENING      IPV IMMUNIZATION      MENINGITIS IMMUNIZATION      HEPATITIS B IMMUNIZATION      Review of external notes as documented elsewhere in note  25 minutes spent on the date of the encounter doing chart review, review of outside records, review of test results, interpretation of tests, patient visit and documentation        I was present with the medical student (Layo Sloan, MS3) who participated in the service and in the documentation of the note. I have verified the history and personally performed the physical exam and medical decision making. I agree with the assessment and plan of care as documented in the note above.    Leon Meyers MD,       Family Medicine, [facility], [place]   This transcription uses voice recognition software, which may contain typographical errors.

## 2022-03-04 ENCOUNTER — TELEPHONE (OUTPATIENT)
Dept: MULTI SPECIALTY CLINIC | Facility: CLINIC | Age: 37
End: 2022-03-04
Payer: COMMERCIAL

## 2022-03-04 LAB
ANA PAT SER IF-IMP: ABNORMAL
ANA PAT SER IF-IMP: ABNORMAL
ANA SER QL IF: POSITIVE
ANA TITR SER IF: ABNORMAL {TITER}
ANA TITR SER IF: ABNORMAL {TITER}
T PALLIDUM AB SER QL: NONREACTIVE

## 2022-03-04 NOTE — TELEPHONE ENCOUNTER
[facility] Call Center    Phone Message    May a detailed message be left on voicemail: yes     Reason for Call: Appointment Intake    Ref[facility] Provider [facility]  Diagnosis and/or Symptoms: Hair loss [L65.9]  Rash [R21]  Elevated antinuclear antibody (DEAN) level   Priority: 1-2 weeks    Patient with priority referral (to be seen within 1-2 weeks), no available appointments.   Please advise if/when she could be fit into Dr. Tinoco's schedule?    FYAGUSTIN-pt believes that the reason her referral was done as a priority referral is because of the rate with which her hair is falling out. Is coming out in big clumps, has started developing bald spots, scalp is beru     Action Taken: Message routed to:  Other: RHEUMATOLOGY SUPPORT POOL    Travel Screening: Not Applicable

## 2022-03-07 NOTE — TELEPHONE ENCOUNTER
Message received from care team advising that pt could be seen for virtual visit today, or could be seen on 3/29 at 12PM using two 'return' spots--as long as those spots were still available.     No more virtual visits available on Dr. Tinoco's schedule for today, called pt and she agreed to come in on 3/29. She would still like to be seen even earlier than that if possible (her doctor gave her the results of her recent lab tests, and she is even more concerned now than she was before).     Pt has been added to Dr. Tinoco's waitlist, pt informed we will let her know if anything opens up before her appointment on 3/29.

## 2022-03-29 ENCOUNTER — TRANSFERRED RECORDS (OUTPATIENT)
Dept: HEALTH INFORMATION MANAGEMENT | Facility: CLINIC | Age: 37
End: 2022-03-29

## 2022-03-29 ENCOUNTER — OFFICE VISIT (OUTPATIENT)
Dept: RHEUMATOLOGY | Facility: CLINIC | Age: 37
End: 2022-03-29
Attending: FAMILY MEDICINE
Payer: COMMERCIAL

## 2022-03-29 ENCOUNTER — LAB (OUTPATIENT)
Dept: LAB | Facility: CLINIC | Age: 37
End: 2022-03-29

## 2022-03-29 VITALS
DIASTOLIC BLOOD PRESSURE: 74 MMHG | BODY MASS INDEX: 46.09 KG/M2 | SYSTOLIC BLOOD PRESSURE: 114 MMHG | WEIGHT: 260.1 LBS | HEIGHT: 63 IN | HEART RATE: 72 BPM

## 2022-03-29 DIAGNOSIS — M25.50 POLYARTHRALGIA: ICD-10-CM

## 2022-03-29 DIAGNOSIS — R76.8 ELEVATED ANTINUCLEAR ANTIBODY (ANA) LEVEL: Primary | ICD-10-CM

## 2022-03-29 DIAGNOSIS — L65.9 HAIR LOSS: ICD-10-CM

## 2022-03-29 DIAGNOSIS — R76.8 ELEVATED ANTINUCLEAR ANTIBODY (ANA) LEVEL: ICD-10-CM

## 2022-03-29 DIAGNOSIS — R21 RASH: ICD-10-CM

## 2022-03-29 LAB
BASOPHILS # BLD AUTO: 0 10E3/UL (ref 0–0.2)
BASOPHILS NFR BLD AUTO: 0 %
C REACTIVE PROTEIN LHE: 1.1 MG/DL (ref 0–0.8)
C3 SERPL-MCNC: 155 MG/DL (ref 83–177)
C4 SERPL-MCNC: 29 MG/DL (ref 19–59)
EOSINOPHIL # BLD AUTO: 0.1 10E3/UL (ref 0–0.7)
EOSINOPHIL NFR BLD AUTO: 1 %
ERYTHROCYTE [DISTWIDTH] IN BLOOD BY AUTOMATED COUNT: 13 % (ref 10–15)
ERYTHROCYTE [SEDIMENTATION RATE] IN BLOOD BY WESTERGREN METHOD: 43 MM/HR (ref 0–20)
HCT VFR BLD AUTO: 38.1 % (ref 35–47)
HGB BLD-MCNC: 12.4 G/DL (ref 11.7–15.7)
IMM GRANULOCYTES # BLD: 0 10E3/UL
IMM GRANULOCYTES NFR BLD: 0 %
LYMPHOCYTES # BLD AUTO: 3.3 10E3/UL (ref 0.8–5.3)
LYMPHOCYTES NFR BLD AUTO: 56 %
MCH RBC QN AUTO: 28.4 PG (ref 26.5–33)
MCHC RBC AUTO-ENTMCNC: 32.5 G/DL (ref 31.5–36.5)
MCV RBC AUTO: 87 FL (ref 78–100)
MONOCYTES # BLD AUTO: 0.5 10E3/UL (ref 0–1.3)
MONOCYTES NFR BLD AUTO: 8 %
NEUTROPHILS # BLD AUTO: 2 10E3/UL (ref 1.6–8.3)
NEUTROPHILS NFR BLD AUTO: 35 %
NRBC # BLD AUTO: 0 10E3/UL
NRBC BLD AUTO-RTO: 0 /100
PLATELET # BLD AUTO: 277 10E3/UL (ref 150–450)
RBC # BLD AUTO: 4.36 10E6/UL (ref 3.8–5.2)
WBC # BLD AUTO: 5.9 10E3/UL (ref 4–11)

## 2022-03-29 PROCEDURE — 99204 OFFICE O/P NEW MOD 45 MIN: CPT | Performed by: INTERNAL MEDICINE

## 2022-03-29 PROCEDURE — 86803 HEPATITIS C AB TEST: CPT

## 2022-03-29 PROCEDURE — 85652 RBC SED RATE AUTOMATED: CPT

## 2022-03-29 PROCEDURE — 85025 COMPLETE CBC W/AUTO DIFF WBC: CPT

## 2022-03-29 PROCEDURE — 86140 C-REACTIVE PROTEIN: CPT

## 2022-03-29 PROCEDURE — 86225 DNA ANTIBODY NATIVE: CPT

## 2022-03-29 PROCEDURE — 86160 COMPLEMENT ANTIGEN: CPT

## 2022-03-29 PROCEDURE — 36415 COLL VENOUS BLD VENIPUNCTURE: CPT

## 2022-03-29 PROCEDURE — 86235 NUCLEAR ANTIGEN ANTIBODY: CPT
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
## 2022-03-30 LAB — HCV AB SERPL QL IA: NEGATIVE

## 2022-03-31 LAB
DSDNA AB SER-ACNC: 1.4 IU/ML
ENA SM IGG SER IA-ACNC: 1.9 U/ML
ENA SM IGG SER IA-ACNC: NEGATIVE
ENA SS-A AB SER IA-ACNC: 0.6 U/ML
ENA SS-A AB SER IA-ACNC: NEGATIVE
ENA SS-B IGG SER IA-ACNC: <0.6 U/ML
ENA SS-B IGG SER IA-ACNC: NEGATIVE
U1 SNRNP IGG SER IA-ACNC: 1.2 U/ML
U1 SNRNP IGG SER IA-ACNC: NEGATIVE

## 2022-04-11 ENCOUNTER — OFFICE VISIT (OUTPATIENT)
Dept: FAMILY MEDICINE | Facility: CLINIC | Age: 37
End: 2022-04-11
Payer: COMMERCIAL

## 2022-04-11 VITALS
BODY MASS INDEX: 46.59 KG/M2 | RESPIRATION RATE: 16 BRPM | HEART RATE: 87 BPM | WEIGHT: 263 LBS | OXYGEN SATURATION: 99 % | DIASTOLIC BLOOD PRESSURE: 79 MMHG | SYSTOLIC BLOOD PRESSURE: 127 MMHG

## 2022-04-11 DIAGNOSIS — R10.2 PELVIC PAIN IN FEMALE: ICD-10-CM

## 2022-04-11 DIAGNOSIS — N93.8 DUB (DYSFUNCTIONAL UTERINE BLEEDING): Primary | ICD-10-CM

## 2022-04-11 PROCEDURE — 87491 CHLMYD TRACH DNA AMP PROBE: CPT | Performed by: FAMILY MEDICINE

## 2022-04-11 PROCEDURE — 99214 OFFICE O/P EST MOD 30 MIN: CPT | Performed by: FAMILY MEDICINE

## 2022-04-11 PROCEDURE — 87591 N.GONORRHOEAE DNA AMP PROB: CPT | Performed by: FAMILY MEDICINE

## 2022-04-12 LAB
C TRACH DNA SPEC QL PROBE+SIG AMP: NEGATIVE
N GONORRHOEA DNA SPEC QL NAA+PROBE: NEGATIVE

## 2022-04-12 NOTE — PROGRESS NOTES
OFFICE VISIT - FAMILY MEDICINE     ASSESSMENT AND PLAN       ICD-10-CM    1. DUB (dysfunctional uterine bleeding)  N93.8 US Pelvic Complete with Transvaginal     Chlamydia & Gonorrhea by PCR, [facility] - Clinic Collect   2. Pelvic pain in female  R10.2 US Pelvic Complete with Transvaginal     Chlamydia & Gonorrhea by PCR, [facility] - Clinic Collect   Dysfunctional uterine bleeding, speculum exam showed a little bit of blood pooling from the cervical os, but no sign of tear or abrasion.  GC chlamydia test was collected.  Pelvic ultrasound ordered, management option discussed with patient, further condition based on pelvic ultrasound, STI results and symptoms.     CHIEF COMPLAINT   Vaginal Bleeding       HPI   Armida Quiñonez is a 36 year old female.  No Patient Care Coordination Note on file.    She still having a bit of spotting after the last day of her menstrual period  About 3 days ago, mild pelvic pain, no vaginal discharge.  She did have  sexual encounter 3 times with a new male partners without using protection about a week ago.  She is wondering about a possible tear in her vagina.  No fever chills.  No burning urination.    Review of Systems As per HPI, otherwise negative.    OBJECTIVE   /79 (BP Location: Left arm, Patient Position: Sitting, Cuff Size: Adult Regular)   Pulse 87   Resp 16   Wt 119.3 kg (263 lb)   SpO2 99%   BMI 46.59 kg/m    Physical Exam  Chaperone present: Exam chaperoned by Jimmyfemale MIHAI.   Constitutional:       Appearance: Normal appearance.   HENT:      Head: Normocephalic and atraumatic.   Cardiovascular:      Rate and Rhythm: Normal rate and regular rhythm.   Pulmonary:      Effort: Pulmonary effort is normal.      Breath sounds: Normal breath sounds.   Genitourinary:     General: Normal vulva.      Vagina: No vaginal discharge.      Cervix: No discharge or erythema.   Musculoskeletal:         General: No swelling.      Cervical back: Normal range of motion.    Neurological:      General: No focal deficit present.      Mental Status: She is alert.   Psychiatric:         Behavior: Behavior normal.         Thought Content: Thought content normal.         Judgment: Judgment normal.         PFSH     Family History   Problem Relation Age of Onset     Cancer Mother      Cancer Maternal Grandmother      Unknown/Adopted No family hx of      Diabetes No family hx of      Hypertension No family hx of      Cerebrovascular Disease No family hx of      Thyroid Disease No family hx of      Glaucoma No family hx of      Macular Degeneration No family hx of      Social History     Socioeconomic History     Marital status:      Spouse name: partner- Rex     Number of children: 3     Years of education: Not on file     Highest education level: Not on file   Occupational History     Occupation: homemaker   Tobacco Use     Smoking status: Never Smoker     Smokeless tobacco: Never Used     Tobacco comment: 7 cigs a week   Substance and Sexual Activity     Alcohol use: Yes     Alcohol/week: 0.0 standard drinks     Comment: Alcoholic Drinks/day: 3 drinks per month     Drug use: Yes     Types: Marijuana     Sexual activity: Not Currently     Partners: Male     Birth control/protection: None   Other Topics Concern     Parent/sibling w/ CABG, MI or angioplasty before 65F 55M? Not Asked   Social History Narrative     Not on file     Social Determinants of Health     Financial Resource Strain: Not on file   Food Insecurity: Not on file   Transportation Needs: Not on file   Physical Activity: Not on file   Stress: Not on file   Social Connections: Not on file   Intimate Partner Violence: Not on file   Housing Stability: Not on file       PMS   [unfilled]  Past Surgical History:   Procedure Laterality Date     DILATION AND CURETTAGE SUCTION, TREAT INCOMPLETE   2015     HC COLP CERVIX/UPPER VAGINA W BX CERVIX  2012    neg     HC INSERTION INTRAUTERINE DEVICE  2010    Mirena     HC  LAPAROSCOPY, SURGICAL; APPENDECTOMY  2012     HC REMOVE INTRAUTERINE DEVICE  2015       RESULTS/CONSULTS (Lab/Rad)   No results found for this or any previous visit (from the past 168 hour(s)).     [unfilled]    HEALTH MAINTENANCE / SCREENING   [unfilled], [unfilled],[unfilled]  Immunization History   Administered Date(s) Administered     COVID-19,PF,Pfizer (12+ Yrs) 11/24/2021, 12/15/2021     DTaP, Unspecified 05/02/2012     HPV 03/13/2010, 06/17/2010, 05/02/2012     HPV Quadrivalent 03/13/2010, 06/17/2010, 05/02/2012     HepB-Adult 12/08/2020, 01/19/2021     Influenza (IIV3) PF 10/15/2009, 06/25/2011     Influenza Vaccine IM > 6 months Valent IIV4 (Alfuria,Fluzone) 10/07/2013, 11/07/2016, 10/22/2018     TDAP Vaccine (Adacel) 05/02/2012     Health Maintenance   Topic     ADVANCE CARE PLANNING      Pneumococcal Vaccine: Pediatrics (0 to 5 Years) and At-Risk Patients (6 to 64 Years) (1 of 2 - PPSV23)     EYE EXAM      ASTHMA ACTION PLAN      INFLUENZA VACCINE (1)     DTAP/TDAP/TD IMMUNIZATION (2 - Tdap)     COVID-19 Vaccine (3 - Booster for Pfizer series)     ASTHMA CONTROL TEST      PREVENTIVE CARE VISIT      ANNUAL REVIEW OF HM ORDERS      HPV TEST      PAP      HEPATITIS C SCREENING      HIV SCREENING      IPV IMMUNIZATION      MENINGITIS IMMUNIZATION      HEPATITIS B IMMUNIZATION      Review of external notes as documented elsewhere in note  25 minutes spent on the date of the encounter doing chart review, review of outside records, review of test results, interpretation of tests, patient visit and documentation          Leon Meyers MD  [facility]   This transcription uses voice recognition software, which may contain typographical errors.

## 2022-04-14 ENCOUNTER — LAB (OUTPATIENT)
Dept: LAB | Facility: CLINIC | Age: 37
End: 2022-04-14
Payer: COMMERCIAL

## 2022-04-14 DIAGNOSIS — Z11.1 SCREENING EXAMINATION FOR PULMONARY TUBERCULOSIS: Primary | ICD-10-CM

## 2022-04-14 PROCEDURE — 36415 COLL VENOUS BLD VENIPUNCTURE: CPT

## 2022-04-14 PROCEDURE — 86481 TB AG RESPONSE T-CELL SUSP: CPT

## 2022-04-16 LAB
GAMMA INTERFERON BACKGROUND BLD IA-ACNC: 0.04 IU/ML
M TB IFN-G BLD-IMP: NEGATIVE
M TB IFN-G CD4+ BCKGRND COR BLD-ACNC: 9.96 IU/ML
MITOGEN IGNF BCKGRD COR BLD-ACNC: 0 IU/ML
MITOGEN IGNF BCKGRD COR BLD-ACNC: 0.01 IU/ML
QUANTIFERON MITOGEN: 10 IU/ML
QUANTIFERON NIL TUBE: 0.04 IU/ML
QUANTIFERON TB1 TUBE: 0.04 IU/ML
QUANTIFERON TB2 TUBE: 0.05

## 2022-04-18 ENCOUNTER — TELEPHONE (OUTPATIENT)
Dept: FAMILY MEDICINE | Facility: CLINIC | Age: 37
End: 2022-04-18
Payer: COMMERCIAL

## 2022-04-18 NOTE — TELEPHONE ENCOUNTER
See message below from Dr Meyers  Contacted patient that Quantiferon TB test is negative and her work form filled out.    Patient will  form and TB results at the [facility] .    No further action needed.    Sumaya Hernandez RN  [facility]        Negative screening TB test.   Written by Leon Meyers MD on 4/18/2022  8:38 AM CDT

## 2022-04-19 ENCOUNTER — OFFICE VISIT (OUTPATIENT)
Dept: FAMILY MEDICINE | Facility: CLINIC | Age: 37
End: 2022-04-19
Payer: COMMERCIAL

## 2022-04-19 VITALS
SYSTOLIC BLOOD PRESSURE: 114 MMHG | OXYGEN SATURATION: 100 % | HEART RATE: 81 BPM | DIASTOLIC BLOOD PRESSURE: 70 MMHG | RESPIRATION RATE: 18 BRPM | TEMPERATURE: 98.1 F | WEIGHT: 263 LBS | BODY MASS INDEX: 46.59 KG/M2

## 2022-04-19 DIAGNOSIS — N93.8 DYSFUNCTIONAL UTERINE BLEEDING: ICD-10-CM

## 2022-04-19 DIAGNOSIS — R10.2 SUPRAPUBIC ABDOMINAL PAIN: Primary | ICD-10-CM

## 2022-04-19 PROCEDURE — 99214 OFFICE O/P EST MOD 30 MIN: CPT | Mod: 25 | Performed by: PHYSICIAN ASSISTANT

## 2022-04-19 PROCEDURE — 96372 THER/PROPH/DIAG INJ SC/IM: CPT | Performed by: PHYSICIAN ASSISTANT

## 2022-04-19 RX ORDER — KETOROLAC TROMETHAMINE 30 MG/ML
30 INJECTION, SOLUTION INTRAMUSCULAR; INTRAVENOUS ONCE
Status: COMPLETED | OUTPATIENT
Start: 2022-04-19 | End: 2022-04-19

## 2022-04-19 RX ORDER — IBUPROFEN 600 MG/1
600 TABLET, FILM COATED ORAL EVERY 6 HOURS PRN
Qty: 30 TABLET | Refills: 0 | Status: SHIPPED | OUTPATIENT
Start: 2022-04-19 | End: 2022-04-26

## 2022-04-19 RX ADMIN — KETOROLAC TROMETHAMINE 30 MG: 30 INJECTION, SOLUTION INTRAMUSCULAR; INTRAVENOUS at 17:16

## 2022-04-19 NOTE — PATIENT INSTRUCTIONS
Conservative measures discussed including warm packs to lower abdomen. Make sure you get the ultrasound ordered by your PCP done. See your primary care provider within 7 days for further management. Seek emergency care if you develop worsening abdominal pain or fever over 103.
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
## 2022-04-20 ENCOUNTER — NURSE TRIAGE (OUTPATIENT)
Dept: NURSING | Facility: CLINIC | Age: 37
End: 2022-04-20
Payer: COMMERCIAL

## 2022-04-20 ENCOUNTER — APPOINTMENT (OUTPATIENT)
Dept: ULTRASOUND IMAGING | Facility: HOSPITAL | Age: 37
End: 2022-04-20
Attending: EMERGENCY MEDICINE
Payer: COMMERCIAL

## 2022-04-20 ENCOUNTER — HOSPITAL ENCOUNTER (EMERGENCY)
Facility: HOSPITAL | Age: 37
Discharge: HOME | End: 2022-04-20
Admitting: PHYSICIAN ASSISTANT
Payer: COMMERCIAL

## 2022-04-20 VITALS
SYSTOLIC BLOOD PRESSURE: 119 MMHG | HEART RATE: 79 BPM | HEIGHT: 63 IN | DIASTOLIC BLOOD PRESSURE: 62 MMHG | BODY MASS INDEX: 46.95 KG/M2 | WEIGHT: 265 LBS | TEMPERATURE: 98.4 F | RESPIRATION RATE: 14 BRPM | OXYGEN SATURATION: 100 %

## 2022-04-20 DIAGNOSIS — N93.9 VAGINAL BLEEDING: ICD-10-CM

## 2022-04-20 DIAGNOSIS — R10.9 ABDOMINAL CRAMPING: ICD-10-CM

## 2022-04-20 DIAGNOSIS — D25.9 FIBROID UTERUS: ICD-10-CM

## 2022-04-20 LAB
ANION GAP SERPL CALCULATED.3IONS-SCNC: 11 MMOL/L (ref 5–18)
BASOPHILS # BLD AUTO: 0 10E3/UL (ref 0–0.2)
BASOPHILS NFR BLD AUTO: 0 %
BUN SERPL-MCNC: 9 MG/DL (ref 8–22)
CALCIUM SERPL-MCNC: 9.1 MG/DL (ref 8.5–10.5)
CHLORIDE BLD-SCNC: 107 MMOL/L (ref 98–107)
CO2 SERPL-SCNC: 22 MMOL/L (ref 22–31)
CREAT SERPL-MCNC: 0.72 MG/DL (ref 0.6–1.1)
EOSINOPHIL # BLD AUTO: 0 10E3/UL (ref 0–0.7)
EOSINOPHIL NFR BLD AUTO: 1 %
ERYTHROCYTE [DISTWIDTH] IN BLOOD BY AUTOMATED COUNT: 13 % (ref 10–15)
GFR SERPL CREATININE-BSD FRML MDRD: >90 ML/MIN/1.73M2
GLUCOSE BLD-MCNC: 119 MG/DL (ref 70–125)
HCG SERPL QL: NEGATIVE
HCT VFR BLD AUTO: 41.1 % (ref 35–47)
HGB BLD-MCNC: 13.5 G/DL (ref 11.7–15.7)
IMM GRANULOCYTES # BLD: 0 10E3/UL
IMM GRANULOCYTES NFR BLD: 0 %
LYMPHOCYTES # BLD AUTO: 2.6 10E3/UL (ref 0.8–5.3)
LYMPHOCYTES NFR BLD AUTO: 56 %
MCH RBC QN AUTO: 28.3 PG (ref 26.5–33)
MCHC RBC AUTO-ENTMCNC: 32.8 G/DL (ref 31.5–36.5)
MCV RBC AUTO: 86 FL (ref 78–100)
MONOCYTES # BLD AUTO: 0.2 10E3/UL (ref 0–1.3)
MONOCYTES NFR BLD AUTO: 4 %
NEUTROPHILS # BLD AUTO: 1.8 10E3/UL (ref 1.6–8.3)
NEUTROPHILS NFR BLD AUTO: 39 %
NRBC # BLD AUTO: 0 10E3/UL
NRBC BLD AUTO-RTO: 0 /100
PLATELET # BLD AUTO: 271 10E3/UL (ref 150–450)
POTASSIUM BLD-SCNC: 4.2 MMOL/L (ref 3.5–5)
RBC # BLD AUTO: 4.77 10E6/UL (ref 3.8–5.2)
SODIUM SERPL-SCNC: 140 MMOL/L (ref 136–145)
WBC # BLD AUTO: 4.6 10E3/UL (ref 4–11)

## 2022-04-20 PROCEDURE — 250N000011 HC RX IP 250 OP 636: Performed by: EMERGENCY MEDICINE

## 2022-04-20 PROCEDURE — 99284 EMERGENCY DEPT VISIT MOD MDM: CPT | Mod: 25

## 2022-04-20 PROCEDURE — 258N000003 HC RX IP 258 OP 636: Performed by: EMERGENCY MEDICINE

## 2022-04-20 PROCEDURE — 84703 CHORIONIC GONADOTROPIN ASSAY: CPT | Performed by: EMERGENCY MEDICINE

## 2022-04-20 PROCEDURE — 36415 COLL VENOUS BLD VENIPUNCTURE: CPT | Performed by: EMERGENCY MEDICINE

## 2022-04-20 PROCEDURE — 80048 BASIC METABOLIC PNL TOTAL CA: CPT | Performed by: EMERGENCY MEDICINE

## 2022-04-20 PROCEDURE — 76856 US EXAM PELVIC COMPLETE: CPT

## 2022-04-20 PROCEDURE — 96374 THER/PROPH/DIAG INJ IV PUSH: CPT

## 2022-04-20 PROCEDURE — 85025 COMPLETE CBC W/AUTO DIFF WBC: CPT | Performed by: EMERGENCY MEDICINE

## 2022-04-20 PROCEDURE — 96361 HYDRATE IV INFUSION ADD-ON: CPT

## 2022-04-20 PROCEDURE — 99285 EMERGENCY DEPT VISIT HI MDM: CPT | Mod: 25

## 2022-04-20 RX ORDER — KETOROLAC TROMETHAMINE 30 MG/ML
15 INJECTION, SOLUTION INTRAMUSCULAR; INTRAVENOUS ONCE
Status: COMPLETED | OUTPATIENT
Start: 2022-04-20 | End: 2022-04-20

## 2022-04-20 RX ADMIN — KETOROLAC TROMETHAMINE 15 MG: 30 INJECTION, SOLUTION INTRAMUSCULAR at 17:28

## 2022-04-20 RX ADMIN — SODIUM CHLORIDE 1000 ML: 9 INJECTION, SOLUTION INTRAVENOUS at 14:48

## 2022-04-20 ASSESSMENT — ENCOUNTER SYMPTOMS
HEMATURIA: 0
DYSURIA: 0
VOMITING: 0
NAUSEA: 0
ABDOMINAL PAIN: 0
DIZZINESS: 0
FEVER: 0

## 2022-04-20 NOTE — ED PROVIDER NOTES
ED Triage Provider Note  [facility]  Encounter Date: Apr 20, 2022      The patient was seen in triage to expedite ED workup.     History:  Chief Complaint   Patient presents with     vaginal bleeding/pain     Armida Quiñonez is a 36 year old female who presents to the ED with one month pelvic cramps with vaginal bleeding despite nexplanon implant, saw PMD for this and recommended US but hasn't had US yet, went to ED yesterday and prescribed analgesics, US appointment scheduled for tomorrow. She isn't sure how long Nexplanon has been in place but thinks it is less than 3 years, no analgesic taken prior to presenting the ER today.    Review of Systems:  No fever      Brief Exam:  Constitutional: Awake, alert, no acute distress apparent  HEENT: Atraumatic, normocephalic  PSYCH: Alert, oriented and good historian    Medical Decision Making:  Patient arriving to the ED with problem as above. A medical screening exam was performed. Orders initiated from triage  to expedite ED workup.             Jayne Gonsalves MD  04/20/22  Emergency Medicine  [facility] EMERGENCY DEPARTMENT  [address] [place] [id number]  [phone]  Dept: [phone]       Jayne Gonsalves MD  04/20/22 1428       Jayne Gonsalves MD  04/20/22 [id number]

## 2022-04-20 NOTE — ED TRIAGE NOTES
Patient comes to ED for evaluation of vaginal pain and bleeding. Stated has birth control implant in her arm. Stated bleeding started one month ago.

## 2022-04-20 NOTE — Clinical Note
Armida Quiñonez was seen and treated in our emergency department on 4/20/2022.  She may return to work on 04/22/2022.       If you have any questions or concerns, please don't hesitate to call.      Christiana Rodriguez PA-C

## 2022-04-20 NOTE — TELEPHONE ENCOUNTER
"Pt calling for pain and significant vaginal bleeding     States she has been bleeding from her vagina for over a month now that contains large blood clots \"and tissues from my body\"    Gets a cramping contraction like feeling and then can feel a blood clot coming out. Rates pain 10/10     Unable to use pads or tampons but using \"really thick paper towels\" that she has to change every hour     Seen in [facility] yesterday and given a pain shot that did not help     Was suppose to have an US done today but when she went to [facility] yesterday they cancelled it because she has a doctors appointment tomorrow     Denies dizziness or lightheadedness    Denies possibility of being pregnant     Has the nexplanon implant in her arm Pain     ED advised and pt is agreeable to this        Reason for Disposition    SEVERE abdominal pain (e.g., excruciating)    Protocols used: VAGINAL BLEEDING - [id number]    COVID 19 Nurse Triage Plan/Patient Instructions    Please be aware that novel coronavirus (COVID-19) may be circulating in the community. If you develop symptoms such as fever, cough, or SOB or if you have concerns about the presence of another infection including coronavirus (COVID-19), please contact your health care provider or visit [email or web address].     Disposition/Instructions    ED Visit recommended. Follow protocol based instructions.     Bring Your Own Device:  Please also bring your smart device(s) (smart phones, tablets, laptops) and their charging cables for your personal use and to communicate with your care team during your visit.    Thank you for taking steps to prevent the spread of this virus.  o Limit your contact with others.  o Wear a simple mask to cover your cough.  o Wash your hands well and often.    Resources    [facility]: About COVID-19: [email or web address]    CDC: What to Do If You're Sick: www.cdc.gov/coronavirus/2019-ncov/about/steps-when-sick.html    CDC: Ending Home " Isolation: www.cdc.gov/coronavirus/2019-ncov/hcp/disposition-in-home-patients.html     CDC: Caring for Someone: www.cdc.gov/coronavirus/2019-ncov/if-you-are-sick/care-for-someone.html     [facility]: Interim Guidance for Hospital Discharge to Home: www.[facility]placed [facility].[place]/diseases/coronavirus/hcp/hospdischarge.pdf    [facility] clinical trials (COVID-19 research studies): clinicalaffairs.[facility]-clinical-trials     Below are the COVID-19 hotlines at the [place] [facility] ([facility]). Interpreters are available.   o For health questions: Call 929-055-4734 or 1-847.828.7053 (7 a.m. to 7 p.m.)  o For questions about schools and childcare: Call 526-234-2043 or 1-576.241.8022 (7 a.m. to 7 p.m.)           Belen Edgar RN [facility] April 20, 2022 1:07 PM
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
## 2022-04-20 NOTE — ED NOTES
"Pt. reports pain 10/10. Pt. States she is on birthcontrol and is not supposed to get her periods. She is unsure when her last period was and if the current vaginal bleed is a \"real period\" or something else.   "

## 2022-04-20 NOTE — DISCHARGE INSTRUCTIONS
Use Tylenol, ibuprofen, and heat packs as needed for abdominal cramping.  Continue to monitor your bleeding.    You do have a small fibroid on ultrasound which could be causing your symptoms.  This also could be because of your Nexplanon.    Follow-up with OB/GYN for further evaluation and management.  Return to the emergency department if you develop fevers, worsening abdominal pain, new discharge, vaginal bleeding (more than 2 pads per hour for 2 consecutive hours), or any other concerning symptoms. We would be happy to see you.

## 2022-04-21 ENCOUNTER — OFFICE VISIT (OUTPATIENT)
Dept: RHEUMATOLOGY | Facility: CLINIC | Age: 37
End: 2022-04-21
Payer: COMMERCIAL

## 2022-04-21 VITALS
HEART RATE: 80 BPM | SYSTOLIC BLOOD PRESSURE: 138 MMHG | BODY MASS INDEX: 46.59 KG/M2 | DIASTOLIC BLOOD PRESSURE: 80 MMHG | WEIGHT: 263 LBS

## 2022-04-21 DIAGNOSIS — R76.8 ELEVATED ANTINUCLEAR ANTIBODY (ANA) LEVEL: ICD-10-CM

## 2022-04-21 DIAGNOSIS — M25.50 POLYARTHRALGIA: Primary | ICD-10-CM

## 2022-04-21 PROCEDURE — 99214 OFFICE O/P EST MOD 30 MIN: CPT | Performed by: INTERNAL MEDICINE
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
## 2022-05-03 ENCOUNTER — OFFICE VISIT (OUTPATIENT)
Dept: OBGYN | Facility: CLINIC | Age: 37
End: 2022-05-03
Payer: COMMERCIAL

## 2022-05-03 VITALS — BODY MASS INDEX: 46.94 KG/M2 | DIASTOLIC BLOOD PRESSURE: 84 MMHG | WEIGHT: 265 LBS | SYSTOLIC BLOOD PRESSURE: 124 MMHG

## 2022-05-03 DIAGNOSIS — N92.1 BREAKTHROUGH BLEEDING ON NEXPLANON: Primary | ICD-10-CM

## 2022-05-03 DIAGNOSIS — R10.9 ABDOMINAL CRAMPING: ICD-10-CM

## 2022-05-03 DIAGNOSIS — Z97.5 BREAKTHROUGH BLEEDING ON NEXPLANON: Primary | ICD-10-CM

## 2022-05-03 DIAGNOSIS — D25.2 SUBSEROUS LEIOMYOMA OF UTERUS: ICD-10-CM

## 2022-05-03 PROCEDURE — 99214 OFFICE O/P EST MOD 30 MIN: CPT | Performed by: OBSTETRICS & GYNECOLOGY
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
## 2022-05-08 ENCOUNTER — HEALTH MAINTENANCE LETTER (OUTPATIENT)
Age: 37
End: 2022-05-08

## 2022-11-22 ENCOUNTER — NURSE TRIAGE (OUTPATIENT)
Dept: NURSING | Facility: CLINIC | Age: 37
End: 2022-11-22

## 2022-11-22 NOTE — TELEPHONE ENCOUNTER
Nurse Triage SBAR    Situation:   -Feet swelling    Background:   -Patient calling.    Assessment:   -Ongoing Swelling in ankles and feet (mostly ankles)  -This comes an goes, but now it has been here since Thursday  -The swelling is Bilateral   -Feet are same temperature and color  -Pain 10/10  -Not pregnant (on birth control)  -Pain with walking (especially up stairs  -Numbness of toes (bilateral)      Recommendation:   -Seen in office today >> go to UC  -Patient declines this disposition.  -Warm transferred to scheduling on there request, she will go to [facility]/ if there is nothing available or if symptoms get worse.    TRINITY BANSAL RN on 11/22/2022 at 11:50 AM       Reason for Disposition    Swelling of both ankles (i.e., pedal edema)    MODERATE swelling of both ankles (e.g., swelling extends up to the knees) AND new-onset or worsening    Additional Information    Negative: Chest pain    Negative: Followed an ankle injury    Negative: Ankle pain is main symptom    Negative: Followed an insect bite and has localized swelling (e.g., small area of puffy or swollen skin)    Negative: Followed a bee sting and has localized swelling (e.g., small area of puffy or swollen skin)    Negative: Swelling of calf or leg is main symptom    Negative: Sounds like a life-threatening emergency to the triager    Negative: Chest pain    Negative: Small area of swelling and followed an insect bite to the area    Negative: Followed a knee injury    Negative: Ankle or foot injury    Negative: Pregnant with leg swelling or edema    Negative: Difficulty breathing at rest    Negative: Entire foot is cool or blue in comparison to other side    Negative: SEVERE swelling (e.g., swelling extends above knee, entire leg is swollen, weeping fluid)    Negative: Thigh or calf pain and only 1 side and present > 1 hour    Negative: Thigh, calf, or ankle swelling in only one leg    Negative: Thigh, calf, or ankle swelling in both legs, but one  side is definitely more swollen (Exception: longstanding difference between legs)    Negative: Cast on leg or ankle and has increasing pain    Negative: Can't walk or can barely stand (new-onset)    Negative: Fever and red area (or area very tender to touch)    Negative: Patient sounds very sick or weak to the triager    Negative: Pregnant 20 or more weeks and sudden weight gain (i.e., > 2 lbs, 1 kg in one week)    Negative: Swelling of face, arm or hands  (Exception: Slight puffiness of fingers during hot weather.)    Protocols used: ANKLE SWELLING-A-OH, LEG SWELLING AND EDEMA-A-OH       09-Mar-2021

## 2022-11-23 ENCOUNTER — OFFICE VISIT (OUTPATIENT)
Dept: FAMILY MEDICINE | Facility: CLINIC | Age: 37
End: 2022-11-23
Payer: COMMERCIAL

## 2022-11-23 VITALS
WEIGHT: 273 LBS | BODY MASS INDEX: 48.37 KG/M2 | SYSTOLIC BLOOD PRESSURE: 128 MMHG | RESPIRATION RATE: 14 BRPM | OXYGEN SATURATION: 98 % | DIASTOLIC BLOOD PRESSURE: 83 MMHG | TEMPERATURE: 99.7 F | HEART RATE: 86 BPM | HEIGHT: 63 IN

## 2022-11-23 DIAGNOSIS — R60.0 LOCALIZED EDEMA: Primary | ICD-10-CM

## 2022-11-23 LAB
ANION GAP SERPL CALCULATED.3IONS-SCNC: 10 MMOL/L (ref 7–15)
BUN SERPL-MCNC: 4 MG/DL (ref 6–20)
CALCIUM SERPL-MCNC: 8.6 MG/DL (ref 8.6–10)
CHLORIDE SERPL-SCNC: 106 MMOL/L (ref 98–107)
CREAT SERPL-MCNC: 0.6 MG/DL (ref 0.51–0.95)
DEPRECATED HCO3 PLAS-SCNC: 23 MMOL/L (ref 22–29)
GFR SERPL CREATININE-BSD FRML MDRD: >90 ML/MIN/1.73M2
GLUCOSE SERPL-MCNC: 91 MG/DL (ref 70–99)
NT-PROBNP SERPL-MCNC: 409 PG/ML (ref 0–450)
POTASSIUM SERPL-SCNC: 3.8 MMOL/L (ref 3.4–5.3)
SODIUM SERPL-SCNC: 139 MMOL/L (ref 136–145)

## 2022-11-23 PROCEDURE — 80048 BASIC METABOLIC PNL TOTAL CA: CPT | Performed by: NURSE PRACTITIONER

## 2022-11-23 PROCEDURE — 36415 COLL VENOUS BLD VENIPUNCTURE: CPT | Performed by: NURSE PRACTITIONER

## 2022-11-23 PROCEDURE — 83880 ASSAY OF NATRIURETIC PEPTIDE: CPT | Performed by: NURSE PRACTITIONER

## 2022-11-23 PROCEDURE — 99214 OFFICE O/P EST MOD 30 MIN: CPT | Performed by: NURSE PRACTITIONER

## 2022-11-23 RX ORDER — FUROSEMIDE 20 MG
20 TABLET ORAL DAILY
Qty: 4 TABLET | Refills: 0 | Status: SHIPPED | OUTPATIENT
Start: 2022-11-23 | End: 2023-06-12

## 2022-11-23 ASSESSMENT — PATIENT HEALTH QUESTIONNAIRE - PHQ9
SUM OF ALL RESPONSES TO PHQ QUESTIONS 1-9: 10
SUM OF ALL RESPONSES TO PHQ QUESTIONS 1-9: 10
10. IF YOU CHECKED OFF ANY PROBLEMS, HOW DIFFICULT HAVE THESE PROBLEMS MADE IT FOR YOU TO DO YOUR WORK, TAKE CARE OF THINGS AT HOME, OR GET ALONG WITH OTHER PEOPLE: SOMEWHAT DIFFICULT

## 2022-11-23 ASSESSMENT — ASTHMA QUESTIONNAIRES
ACT_TOTALSCORE: 20
QUESTION_2 LAST FOUR WEEKS HOW OFTEN HAVE YOU HAD SHORTNESS OF BREATH: ONCE OR TWICE A WEEK
ACT_TOTALSCORE: 20
QUESTION_3 LAST FOUR WEEKS HOW OFTEN DID YOUR ASTHMA SYMPTOMS (WHEEZING, COUGHING, SHORTNESS OF BREATH, CHEST TIGHTNESS OR PAIN) WAKE YOU UP AT NIGHT OR EARLIER THAN USUAL IN THE MORNING: ONCE OR TWICE
QUESTION_1 LAST FOUR WEEKS HOW MUCH OF THE TIME DID YOUR ASTHMA KEEP YOU FROM GETTING AS MUCH DONE AT WORK, SCHOOL OR AT HOME: A LITTLE OF THE TIME
QUESTION_5 LAST FOUR WEEKS HOW WOULD YOU RATE YOUR ASTHMA CONTROL: WELL CONTROLLED
QUESTION_4 LAST FOUR WEEKS HOW OFTEN HAVE YOU USED YOUR RESCUE INHALER OR NEBULIZER MEDICATION (SUCH AS ALBUTEROL): ONCE A WEEK OR LESS

## 2022-11-23 ASSESSMENT — PAIN SCALES - GENERAL: PAINLEVEL: NO PAIN (0)

## 2022-11-23 NOTE — PROGRESS NOTES
"  Assessment & Plan     Localized edema  ddx broad; ?r/t increase sodium intake; weight, kidney fx, inactivity; cardiac, venous insuff; lymphedema  Will check basic function, recommend compression socks; BNP and imaging; follow up with pcp if no improvement or worsening condition  - Basic metabolic panel  (Ca, Cl, CO2, Creat, Gluc, K, Na, BUN)  - furosemide (LASIX) 20 MG tablet  Dispense: 4 tablet; Refill: 0  - Orthotics and Prosthetics DME Compression; Leg; Knee; Bilateral; 15/20 mmHg; 12 Pair  - BNP-N terminal pro  - Basic metabolic panel  (Ca, Cl, CO2, Creat, Gluc, K, Na, BUN)  - BNP-N terminal pro  - US Venous Competency Bilateral       BMI:   Estimated body mass index is 48.36 kg/m  as calculated from the following:    Height as of this encounter: 1.6 m (5' 3\").    Weight as of this encounter: 123.8 kg (273 lb).   Weight management plan: Discussed healthy diet and exercise guidelines    FUTURE APPOINTMENTS:       - Follow-up visit in UC/ED w/dyspnea, ble pain     No follow-ups on file.    RENO Ontiveros CNP  [facility]    Mandi Huffman is a 37 year old presenting for the following health issues: Musculoskeletal Problem (Swollen foot/ Ankle )    Recent trip to [facility] developed swollen feet and ankles while visiting and on return home continues to be swollen; denies pain, redness or swelling to calf; has hx of this happening before. Denies shortness of breath, orthopnea, PND; BP stable. Tried diuretic type herbal treatment with mild improvement. Not wearing compression sock; BMI 48      Musculoskeletal Problem    History of Present Illness       Reason for visit:  Swelling  Symptom onset:  1-3 days ago  Symptom intensity:  Severe  Symptom progression:  Staying the same  Had these symptoms before:  Yes  Has tried/received treatment for these symptoms:  No  What makes it worse:  No  What makes it better:  No    She eats 0-1 servings of fruits and vegetables daily.She consumes " "0 sweetened beverage(s) daily.She exercises with enough effort to increase her heart rate 9 or less minutes per day.  She exercises with enough effort to increase her heart rate 3 or less days per week. She is missing 3 dose(s) of medications per week.  She is not taking prescribed medications regularly due to remembering to take.    Today's PHQ-9         PHQ-9 Total Score: 10    PHQ-9 Q9 Thoughts of better off dead/self-harm past 2 weeks :   Not at all    How difficult have these problems made it for you to do your work, take care of things at home, or get along with other people: Somewhat difficult             Review of Systems   See above      Objective    /83 (BP Location: Left arm, Patient Position: Sitting, Cuff Size: Adult Large)   Pulse 86   Temp 99.7  F (37.6  C) (Tympanic)   Resp 14   Ht 1.6 m (5' 3\")   Wt 123.8 kg (273 lb)   SpO2 98%   BMI 48.36 kg/m    Body mass index is 48.36 kg/m .       Physical Exam   GENERAL: healthy, alert and no distress  NECK: no adenopathy, no asymmetry, masses, or scars and thyroid normal to palpation  RESP: lungs clear to auscultation - no rales, rhonchi or wheezes  CV: regular rate and rhythm, normal S1 S2, no S3 or S4, no murmur, click or rub, no peripheral edema and peripheral pulses strong  ABDOMEN: soft, nontender, no hepatosplenomegaly, no masses and bowel sounds normal  MS: bilateral feet with nonpitting edema and peripheral pulses normal; negative jolynn sign    Results for orders placed or performed in visit on 11/23/22   Basic metabolic panel  (Ca, Cl, CO2, Creat, Gluc, K, Na, BUN)     Status: Abnormal   Result Value Ref Range    Sodium 139 136 - 145 mmol/L    Potassium 3.8 3.4 - 5.3 mmol/L    Chloride 106 98 - 107 mmol/L    Carbon Dioxide (CO2) 23 22 - 29 mmol/L    Anion Gap 10 7 - 15 mmol/L    Urea Nitrogen 4.0 (L) 6.0 - 20.0 mg/dL    Creatinine 0.60 0.51 - 0.95 mg/dL    Calcium 8.6 8.6 - 10.0 mg/dL    Glucose 91 70 - 99 mg/dL    GFR Estimate >90 >60 " mL/min/1.73m2   BNP-N terminal pro     Status: Normal   Result Value Ref Range    N Terminal Pro BNP Outpatient 409 0 - 450 pg/mL                   DME (Durable Medical Equipment) Orders and Documentation  Orders Placed This Encounter   Procedures     Orthotics and Prosthetics DME Compression; Leg; Knee; Bilateral; 15/20 mmHg; 12 Pair      The patient was assessed and it was determined the patient is in need of the following listed DME Supplies/Equipment. Please complete supporting documentation below to demonstrate medical necessity.      DME All Other Item(s) Documentation    List reason for need and supporting documentation for medical necessity below for each DME item.     1.

## 2022-11-25 ENCOUNTER — TELEPHONE (OUTPATIENT)
Dept: FAMILY MEDICINE | Facility: CLINIC | Age: 37
End: 2022-11-25

## 2022-11-25 NOTE — TELEPHONE ENCOUNTER
Maria Antonia from [facility] calling, [phone]    Order is for US of lower extremity venous duplex bilateral  (this would be for blood clot) . This was ordered stat    Dx for exam is bilateral edema, ? Venous insufficiency       Were you worried about a blood clot? Or if worried about valve competency it should be ordered as venous insufficiency study.    Suyapa Alejandro, RN, BSN  [facility]

## 2022-11-28 ENCOUNTER — ANCILLARY PROCEDURE (OUTPATIENT)
Dept: VASCULAR ULTRASOUND | Facility: CLINIC | Age: 37
End: 2022-11-28
Attending: NURSE PRACTITIONER
Payer: COMMERCIAL

## 2022-11-28 DIAGNOSIS — R60.0 LOCALIZED EDEMA: ICD-10-CM

## 2022-11-28 PROCEDURE — 93970 EXTREMITY STUDY: CPT | Mod: 26 | Performed by: SURGERY

## 2022-11-28 PROCEDURE — 93970 EXTREMITY STUDY: CPT

## 2022-11-30 NOTE — RESULT ENCOUNTER NOTE
Hi Armida,    Your recent results are normal. Any results slightly above or below the normal range have been evaluated as clinically stable.     Let me know if you have any questions or concerns.    Sincerely,  RENO Ontiveros CNP

## 2023-01-14 ENCOUNTER — HEALTH MAINTENANCE LETTER (OUTPATIENT)
Age: 38
End: 2023-01-14

## 2023-04-23 ENCOUNTER — HEALTH MAINTENANCE LETTER (OUTPATIENT)
Age: 38
End: 2023-04-23

## 2023-06-12 ENCOUNTER — OFFICE VISIT (OUTPATIENT)
Dept: FAMILY MEDICINE | Facility: CLINIC | Age: 38
End: 2023-06-12
Payer: COMMERCIAL

## 2023-06-12 VITALS
SYSTOLIC BLOOD PRESSURE: 116 MMHG | TEMPERATURE: 98.1 F | OXYGEN SATURATION: 100 % | WEIGHT: 274 LBS | HEART RATE: 78 BPM | HEIGHT: 65 IN | RESPIRATION RATE: 24 BRPM | BODY MASS INDEX: 45.65 KG/M2 | DIASTOLIC BLOOD PRESSURE: 72 MMHG

## 2023-06-12 DIAGNOSIS — E66.01 MORBID OBESITY (H): ICD-10-CM

## 2023-06-12 DIAGNOSIS — R60.0 BILATERAL LOWER EXTREMITY EDEMA: ICD-10-CM

## 2023-06-12 DIAGNOSIS — R53.82 CHRONIC FATIGUE: ICD-10-CM

## 2023-06-12 DIAGNOSIS — E55.9 VITAMIN D DEFICIENCY: ICD-10-CM

## 2023-06-12 DIAGNOSIS — F33.0 MILD RECURRENT MAJOR DEPRESSION (H): ICD-10-CM

## 2023-06-12 DIAGNOSIS — L65.9 HAIR LOSS: ICD-10-CM

## 2023-06-12 DIAGNOSIS — Z11.3 SCREEN FOR STD (SEXUALLY TRANSMITTED DISEASE): Primary | ICD-10-CM

## 2023-06-12 DIAGNOSIS — Z23 NEED FOR PNEUMOCOCCAL 20-VALENT CONJUGATE VACCINATION: ICD-10-CM

## 2023-06-12 DIAGNOSIS — R30.0 DYSURIA: ICD-10-CM

## 2023-06-12 DIAGNOSIS — Z23 NEED FOR PROPHYLACTIC VACCINATION AGAINST DIPHTHERIA AND TETANUS: ICD-10-CM

## 2023-06-12 LAB
ALBUMIN SERPL BCG-MCNC: 3.8 G/DL (ref 3.5–5.2)
ALBUMIN UR-MCNC: NEGATIVE MG/DL
ALP SERPL-CCNC: 73 U/L (ref 35–104)
ALT SERPL W P-5'-P-CCNC: 10 U/L (ref 10–35)
ANION GAP SERPL CALCULATED.3IONS-SCNC: 11 MMOL/L (ref 7–15)
APPEARANCE UR: CLEAR
AST SERPL W P-5'-P-CCNC: 20 U/L (ref 10–35)
BACTERIA #/AREA URNS HPF: ABNORMAL /HPF
BASOPHILS # BLD AUTO: 0 10E3/UL (ref 0–0.2)
BASOPHILS NFR BLD AUTO: 1 %
BILIRUB SERPL-MCNC: 0.4 MG/DL
BILIRUB UR QL STRIP: NEGATIVE
BUN SERPL-MCNC: 6.9 MG/DL (ref 6–20)
CALCIUM SERPL-MCNC: 8.9 MG/DL (ref 8.6–10)
CHLORIDE SERPL-SCNC: 103 MMOL/L (ref 98–107)
CLUE CELLS: ABNORMAL
COLOR UR AUTO: YELLOW
CREAT SERPL-MCNC: 0.62 MG/DL (ref 0.51–0.95)
DEPRECATED CALCIDIOL+CALCIFEROL SERPL-MC: 13 UG/L (ref 20–75)
DEPRECATED HCO3 PLAS-SCNC: 24 MMOL/L (ref 22–29)
EOSINOPHIL # BLD AUTO: 0.1 10E3/UL (ref 0–0.7)
EOSINOPHIL NFR BLD AUTO: 1 %
ERYTHROCYTE [DISTWIDTH] IN BLOOD BY AUTOMATED COUNT: 12.7 % (ref 10–15)
GFR SERPL CREATININE-BSD FRML MDRD: >90 ML/MIN/1.73M2
GLUCOSE SERPL-MCNC: 93 MG/DL (ref 70–99)
GLUCOSE UR STRIP-MCNC: NEGATIVE MG/DL
HCT VFR BLD AUTO: 37.2 % (ref 35–47)
HCV AB SERPL QL IA: NONREACTIVE
HGB BLD-MCNC: 11.9 G/DL (ref 11.7–15.7)
HGB UR QL STRIP: NEGATIVE
HIV 1+2 AB+HIV1 P24 AG SERPL QL IA: NONREACTIVE
IMM GRANULOCYTES # BLD: 0 10E3/UL
IMM GRANULOCYTES NFR BLD: 0 %
KETONES UR STRIP-MCNC: NEGATIVE MG/DL
LEUKOCYTE ESTERASE UR QL STRIP: NEGATIVE
LYMPHOCYTES # BLD AUTO: 2.9 10E3/UL (ref 0.8–5.3)
LYMPHOCYTES NFR BLD AUTO: 52 %
MCH RBC QN AUTO: 28.1 PG (ref 26.5–33)
MCHC RBC AUTO-ENTMCNC: 32 G/DL (ref 31.5–36.5)
MCV RBC AUTO: 88 FL (ref 78–100)
MONOCYTES # BLD AUTO: 0.4 10E3/UL (ref 0–1.3)
MONOCYTES NFR BLD AUTO: 7 %
NEUTROPHILS # BLD AUTO: 2.2 10E3/UL (ref 1.6–8.3)
NEUTROPHILS NFR BLD AUTO: 39 %
NITRATE UR QL: NEGATIVE
NRBC # BLD AUTO: 0 10E3/UL
NRBC BLD AUTO-RTO: 0 /100
PH UR STRIP: 6 [PH] (ref 5–8)
PLATELET # BLD AUTO: 297 10E3/UL (ref 150–450)
POTASSIUM SERPL-SCNC: 3.9 MMOL/L (ref 3.4–5.3)
PROT SERPL-MCNC: 7.1 G/DL (ref 6.4–8.3)
RBC # BLD AUTO: 4.23 10E6/UL (ref 3.8–5.2)
RBC #/AREA URNS AUTO: ABNORMAL /HPF
SODIUM SERPL-SCNC: 138 MMOL/L (ref 136–145)
SP GR UR STRIP: 1.02 (ref 1–1.03)
SQUAMOUS #/AREA URNS AUTO: ABNORMAL /LPF
TRICHOMONAS, WET PREP: ABNORMAL
TSH SERPL DL<=0.005 MIU/L-ACNC: 1.34 UIU/ML (ref 0.3–4.2)
UROBILINOGEN UR STRIP-ACNC: 0.2 E.U./DL
WBC # BLD AUTO: 5.7 10E3/UL (ref 4–11)
WBC #/AREA URNS AUTO: ABNORMAL /HPF
WBC'S/HIGH POWER FIELD, WET PREP: ABNORMAL
YEAST, WET PREP: ABNORMAL

## 2023-06-12 PROCEDURE — 36415 COLL VENOUS BLD VENIPUNCTURE: CPT | Performed by: FAMILY MEDICINE

## 2023-06-12 PROCEDURE — 87389 HIV-1 AG W/HIV-1&-2 AB AG IA: CPT | Performed by: FAMILY MEDICINE

## 2023-06-12 PROCEDURE — 87210 SMEAR WET MOUNT SALINE/INK: CPT | Performed by: FAMILY MEDICINE

## 2023-06-12 PROCEDURE — 90715 TDAP VACCINE 7 YRS/> IM: CPT | Performed by: FAMILY MEDICINE

## 2023-06-12 PROCEDURE — 90471 IMMUNIZATION ADMIN: CPT | Performed by: FAMILY MEDICINE

## 2023-06-12 PROCEDURE — 87491 CHLMYD TRACH DNA AMP PROBE: CPT | Performed by: FAMILY MEDICINE

## 2023-06-12 PROCEDURE — 87591 N.GONORRHOEAE DNA AMP PROB: CPT | Performed by: FAMILY MEDICINE

## 2023-06-12 PROCEDURE — 81001 URINALYSIS AUTO W/SCOPE: CPT | Performed by: FAMILY MEDICINE

## 2023-06-12 PROCEDURE — 80050 GENERAL HEALTH PANEL: CPT | Performed by: FAMILY MEDICINE

## 2023-06-12 PROCEDURE — 99214 OFFICE O/P EST MOD 30 MIN: CPT | Mod: 25 | Performed by: FAMILY MEDICINE

## 2023-06-12 PROCEDURE — 86803 HEPATITIS C AB TEST: CPT | Performed by: FAMILY MEDICINE

## 2023-06-12 PROCEDURE — 90472 IMMUNIZATION ADMIN EACH ADD: CPT | Performed by: FAMILY MEDICINE

## 2023-06-12 PROCEDURE — 87086 URINE CULTURE/COLONY COUNT: CPT | Performed by: FAMILY MEDICINE

## 2023-06-12 PROCEDURE — 90677 PCV20 VACCINE IM: CPT | Performed by: FAMILY MEDICINE

## 2023-06-12 PROCEDURE — 82306 VITAMIN D 25 HYDROXY: CPT | Performed by: FAMILY MEDICINE

## 2023-06-12 RX ORDER — EPINEPHRINE 0.3 MG/.3ML
INJECTION SUBCUTANEOUS
COMMUNITY
Start: 2023-03-26

## 2023-06-12 RX ORDER — IBUPROFEN 600 MG/1
600 TABLET, FILM COATED ORAL EVERY 6 HOURS PRN
COMMUNITY
Start: 2023-03-02 | End: 2023-08-03

## 2023-06-12 ASSESSMENT — ASTHMA QUESTIONNAIRES
QUESTION_4 LAST FOUR WEEKS HOW OFTEN HAVE YOU USED YOUR RESCUE INHALER OR NEBULIZER MEDICATION (SUCH AS ALBUTEROL): TWO OR THREE TIMES PER WEEK
ACT_TOTALSCORE: 18
QUESTION_3 LAST FOUR WEEKS HOW OFTEN DID YOUR ASTHMA SYMPTOMS (WHEEZING, COUGHING, SHORTNESS OF BREATH, CHEST TIGHTNESS OR PAIN) WAKE YOU UP AT NIGHT OR EARLIER THAN USUAL IN THE MORNING: ONCE OR TWICE
QUESTION_1 LAST FOUR WEEKS HOW MUCH OF THE TIME DID YOUR ASTHMA KEEP YOU FROM GETTING AS MUCH DONE AT WORK, SCHOOL OR AT HOME: A LITTLE OF THE TIME
QUESTION_2 LAST FOUR WEEKS HOW OFTEN HAVE YOU HAD SHORTNESS OF BREATH: ONCE OR TWICE A WEEK
ACT_TOTALSCORE: 18
QUESTION_5 LAST FOUR WEEKS HOW WOULD YOU RATE YOUR ASTHMA CONTROL: SOMEWHAT CONTROLLED

## 2023-06-12 ASSESSMENT — ENCOUNTER SYMPTOMS: BACK PAIN: 1

## 2023-06-12 ASSESSMENT — ANXIETY QUESTIONNAIRES
2. NOT BEING ABLE TO STOP OR CONTROL WORRYING: NEARLY EVERY DAY
GAD7 TOTAL SCORE: 14
3. WORRYING TOO MUCH ABOUT DIFFERENT THINGS: NEARLY EVERY DAY
6. BECOMING EASILY ANNOYED OR IRRITABLE: SEVERAL DAYS
1. FEELING NERVOUS, ANXIOUS, OR ON EDGE: MORE THAN HALF THE DAYS
5. BEING SO RESTLESS THAT IT IS HARD TO SIT STILL: SEVERAL DAYS
7. FEELING AFRAID AS IF SOMETHING AWFUL MIGHT HAPPEN: SEVERAL DAYS
GAD7 TOTAL SCORE: 14
8. IF YOU CHECKED OFF ANY PROBLEMS, HOW DIFFICULT HAVE THESE MADE IT FOR YOU TO DO YOUR WORK, TAKE CARE OF THINGS AT HOME, OR GET ALONG WITH OTHER PEOPLE?: NOT DIFFICULT AT ALL
GAD7 TOTAL SCORE: 14
7. FEELING AFRAID AS IF SOMETHING AWFUL MIGHT HAPPEN: SEVERAL DAYS
IF YOU CHECKED OFF ANY PROBLEMS ON THIS QUESTIONNAIRE, HOW DIFFICULT HAVE THESE PROBLEMS MADE IT FOR YOU TO DO YOUR WORK, TAKE CARE OF THINGS AT HOME, OR GET ALONG WITH OTHER PEOPLE: NOT DIFFICULT AT ALL
4. TROUBLE RELAXING: NEARLY EVERY DAY

## 2023-06-12 ASSESSMENT — PATIENT HEALTH QUESTIONNAIRE - PHQ9
SUM OF ALL RESPONSES TO PHQ QUESTIONS 1-9: 14
SUM OF ALL RESPONSES TO PHQ QUESTIONS 1-9: 14
10. IF YOU CHECKED OFF ANY PROBLEMS, HOW DIFFICULT HAVE THESE PROBLEMS MADE IT FOR YOU TO DO YOUR WORK, TAKE CARE OF THINGS AT HOME, OR GET ALONG WITH OTHER PEOPLE: SOMEWHAT DIFFICULT

## 2023-06-12 NOTE — PROGRESS NOTES
1. Screen for STD (sexually transmitted disease)  Patient desires STD screening - discussed options - see orders  - HIV Antigen Antibody Combo; Future  - Hepatitis C antibody; Future  - Chlamydia trachomatis/Neisseria gonorrhoeae by PCR - Clinic Collect  - Wet prep - Clinic Collect  - HIV Antigen Antibody Combo  - Hepatitis C antibody    2. Dysuria  Complains of dysuria as well - check UA/UC - treat only if culture positive  - UA with Microscopic - lab collect; Future  - Urine Culture Aerobic Bacterial - lab collect; Future  - UA with Microscopic - lab collect  - Urine Culture Aerobic Bacterial - lab collect  - Urine Microscopic Exam    3. Mild recurrent major depression (H)  Patient has mild depression - no significant change      11/24/2021     7:14 AM 11/23/2022     3:19 PM 6/12/2023     8:30 AM   PHQ   PHQ-9 Total Score 18 10 14   Q9: Thoughts of better off dead/self-harm past 2 weeks Not at all Not at all Not at all         4. Morbid obesity (H)  Morbid obesity - discussed diet/exercise/options - will refer to weight management   - Adult Comprehensive Weight Management  Referral; Future  - TSH; Future  - TSH    5. Need for prophylactic vaccination against diphtheria and tetanus  Due for tetanus booster - discussed - ordered   - TDAP VACCINE (Adacel, Boostrix)    6. Need for pneumococcal 20-valent conjugate vaccination  Due for pneumococcal vaccine  -discussed - ordered   - Pneumococcal 20 Valent Conjugate (Prevnar 20)    7. Bilateral lower extremity edema  Patient has chronic bilateral lower extremity edema - referral made to Lymphedema therapy   - Lymphedema Therapy Referral; Future  - Comprehensive metabolic panel (BMP + Alb, Alk Phos, ALT, AST, Total. Bili, TP); Future  - Comprehensive metabolic panel (BMP + Alb, Alk Phos, ALT, AST, Total. Bili, TP)    8. Chronic fatigue  Patient complains of chronic fatigue - likely multifactorial - check hemogram as well   - CBC with Platelets & Differential;  Future  - CBC with Platelets & Differential    9. Hair loss  Patient has had significant hair loss - and has had previous workup -     10. Vitamin D deficiency  H/o low vitamin D - check levels -   - Vitamin D Deficiency; Future  - Vitamin D Deficiency      Mandi Huffman is a 37 year old, presenting for the following health issues:  Spider Veins/legs (Sharp pains in the legs- ), Weight Loss (Concerned for something in blood work/ not fasting), Back Pain, Hair Loss, and STD        2023     8:40 AM   Additional Questions   Roomed by Nicolle TYLER     Pain in legs, thighs  Worries about this  Changes way she eats -   Detoxes/juices    Varicose veins spread -   Feels like she's going to fall     Always has back pain     Didn't get compression stockings - got lost on way  Bought some at [employer]    Patient is adopted - in system most of life; adopted age 16; met her mom when she was in 20s  Mom - drugs -  in her 50s   Hepatitis -    Maternal aunt - had swelling in legs-  age 35; used drugs  MGM -  in 40s     Works as  -   No medications - occ. Ibuprofen  Alcohol - not always daily   Daily marijuana -   No other drugs    Wants STD testing - sexually active -     Has 3 children :  14, 15, 16      Back Pain   Pertinent negatives include no fever, no abdominal pain and no weakness.   History of Present Illness       Back Pain:  She presents for follow up of back pain. Patient's back pain is a recurring problem.  Location of back pain:  Other  Description of back pain: fullness  Back pain spreads: right buttocks, right thigh, right knee, right foot, right shoulder and right side of neck    Since patient first noticed back pain, pain is: rapidly worsening  Does back pain interfere with her job:  Yes      Mental Health Follow-up:  Patient presents to follow-up on Depression & Anxiety.Patient's depression since last visit has been:  Medium  The patient is not having other symptoms associated with  "depression.  Patient's anxiety since last visit has been:  Bad  The patient is not having other symptoms associated with anxiety.  Any significant life events: job concerns, financial concerns, housing concerns and grief or loss  Patient is feeling anxious or having panic attacks.  Patient has concerns about alcohol or drug use.    She eats 0-1 servings of fruits and vegetables daily.She consumes 1 sweetened beverage(s) daily.She exercises with enough effort to increase her heart rate 9 or less minutes per day.  She exercises with enough effort to increase her heart rate 3 or less days per week. She is missing 7 dose(s) of medications per week.    Today's PHQ-9         PHQ-9 Total Score: 14    PHQ-9 Q9 Thoughts of better off dead/self-harm past 2 weeks :   Not at all    How difficult have these problems made it for you to do your work, take care of things at home, or get along with other people: Somewhat difficult  Today's DEMETRI-7 Score: 14               Review of Systems   Constitutional: Negative.  Negative for chills and fever.   HENT: Negative.    Eyes: Negative for visual disturbance.   Respiratory: Negative.  Negative for cough and shortness of breath.    Cardiovascular: Positive for peripheral edema.   Gastrointestinal: Negative for abdominal pain.   Endocrine: Negative for polydipsia and polyuria.   Genitourinary: Positive for vaginal discharge.   Musculoskeletal: Positive for back pain.   Skin:        Hair loss     Allergic/Immunologic: Negative.    Neurological: Negative.  Negative for weakness.   Hematological: Does not bruise/bleed easily.   Psychiatric/Behavioral: Positive for mood changes. The patient is nervous/anxious.    All other systems reviewed and are negative.           Objective    /72 (BP Location: Right arm, Patient Position: Sitting, Cuff Size: Adult Large)   Pulse 78   Temp 98.1  F (36.7  C)   Resp 24   Ht 1.64 m (5' 4.57\")   Wt 124.3 kg (274 lb)   LMP 05/29/2023 (Approximate)   " SpO2 100%   BMI 46.21 kg/m    Body mass index is 46.21 kg/m .  Physical Exam  Vitals reviewed.   Constitutional:       General: She is not in acute distress.     Appearance: Normal appearance.   HENT:      Head: Normocephalic.      Right Ear: Tympanic membrane, ear canal and external ear normal.      Left Ear: Tympanic membrane, ear canal and external ear normal.      Nose: Nose normal.      Mouth/Throat:      Mouth: Mucous membranes are moist.      Pharynx: No posterior oropharyngeal erythema.   Eyes:      Extraocular Movements: Extraocular movements intact.      Conjunctiva/sclera: Conjunctivae normal.      Pupils: Pupils are equal, round, and reactive to light.   Cardiovascular:      Rate and Rhythm: Normal rate and regular rhythm.      Pulses: Normal pulses.      Heart sounds: Normal heart sounds. No murmur heard.  Pulmonary:      Effort: Pulmonary effort is normal.      Breath sounds: Normal breath sounds.   Abdominal:      Palpations: Abdomen is soft. There is no mass.      Tenderness: There is no abdominal tenderness. There is no guarding or rebound.   Genitourinary:     Comments: PELVIC EXAM:External genitalia: normal  Vaginal mucosa normal  Vaginal discharge: yellow/white  Speculum exam shows a normal appearing cervix .   Bimanual exam: Cervix closed, firm, non tender  to motion.     Musculoskeletal:         General: No deformity. Normal range of motion.      Cervical back: Normal range of motion and neck supple.   Lymphadenopathy:      Cervical: No cervical adenopathy.   Skin:     General: Skin is warm and dry.   Neurological:      General: No focal deficit present.      Mental Status: She is alert.   Psychiatric:         Mood and Affect: Mood normal.         Behavior: Behavior normal.            Results for orders placed or performed in visit on 06/12/23   HIV Antigen Antibody Combo     Status: Normal   Result Value Ref Range    HIV Antigen Antibody Combo Nonreactive Nonreactive   Hepatitis C antibody      Status: Normal   Result Value Ref Range    Hepatitis C Antibody Nonreactive Nonreactive    Narrative    Assay performance characteristics have not been established for newborns, infants, and children.   Comprehensive metabolic panel (BMP + Alb, Alk Phos, ALT, AST, Total. Bili, TP)     Status: Normal   Result Value Ref Range    Sodium 138 136 - 145 mmol/L    Potassium 3.9 3.4 - 5.3 mmol/L    Chloride 103 98 - 107 mmol/L    Carbon Dioxide (CO2) 24 22 - 29 mmol/L    Anion Gap 11 7 - 15 mmol/L    Urea Nitrogen 6.9 6.0 - 20.0 mg/dL    Creatinine 0.62 0.51 - 0.95 mg/dL    Calcium 8.9 8.6 - 10.0 mg/dL    Glucose 93 70 - 99 mg/dL    Alkaline Phosphatase 73 35 - 104 U/L    AST 20 10 - 35 U/L    ALT 10 10 - 35 U/L    Protein Total 7.1 6.4 - 8.3 g/dL    Albumin 3.8 3.5 - 5.2 g/dL    Bilirubin Total 0.4 <=1.2 mg/dL    GFR Estimate >90 >60 mL/min/1.73m2   TSH     Status: Normal   Result Value Ref Range    TSH 1.34 0.30 - 4.20 uIU/mL   Vitamin D Deficiency     Status: Abnormal   Result Value Ref Range    Vitamin D, Total (25-Hydroxy) 13 (L) 20 - 75 ug/L    Narrative    Season, race, dietary intake, and treatment affect the concentration of 25-hydroxy-Vitamin D. Values may decrease during winter months and increase during summer months. Values 20-29 ug/L may indicate Vitamin D insufficiency and values <20 ug/L may indicate Vitamin D deficiency.    Vitamin D determination is routinely performed by an immunoassay specific for 25 hydroxyvitamin D3.  If an individual is on vitamin D2(ergocalciferol) supplementation, please specify 25 OH vitamin D2 and D3 level determination by LCMSMS test VITD23.     CBC with platelets and differential     Status: None   Result Value Ref Range    WBC Count 5.7 4.0 - 11.0 10e3/uL    RBC Count 4.23 3.80 - 5.20 10e6/uL    Hemoglobin 11.9 11.7 - 15.7 g/dL    Hematocrit 37.2 35.0 - 47.0 %    MCV 88 78 - 100 fL    MCH 28.1 26.5 - 33.0 pg    MCHC 32.0 31.5 - 36.5 g/dL    RDW 12.7 10.0 - 15.0 %    Platelet  Count 297 150 - 450 10e3/uL    % Neutrophils 39 %    % Lymphocytes 52 %    % Monocytes 7 %    % Eosinophils 1 %    % Basophils 1 %    % Immature Granulocytes 0 %    NRBCs per 100 WBC 0 <1 /100    Absolute Neutrophils 2.2 1.6 - 8.3 10e3/uL    Absolute Lymphocytes 2.9 0.8 - 5.3 10e3/uL    Absolute Monocytes 0.4 0.0 - 1.3 10e3/uL    Absolute Eosinophils 0.1 0.0 - 0.7 10e3/uL    Absolute Basophils 0.0 0.0 - 0.2 10e3/uL    Absolute Immature Granulocytes 0.0 <=0.4 10e3/uL    Absolute NRBCs 0.0 10e3/uL   UA with Microscopic - lab collect     Status: Normal   Result Value Ref Range    Color Urine Yellow Colorless, Straw, Light Yellow, Yellow    Appearance Urine Clear Clear    Glucose Urine Negative Negative mg/dL    Bilirubin Urine Negative Negative    Ketones Urine Negative Negative mg/dL    Specific Gravity Urine 1.020 1.005 - 1.030    Blood Urine Negative Negative    pH Urine 6.0 5.0 - 8.0    Protein Albumin Urine Negative Negative mg/dL    Urobilinogen Urine 0.2 0.2, 1.0 E.U./dL    Nitrite Urine Negative Negative    Leukocyte Esterase Urine Negative Negative   Urine Microscopic Exam     Status: Abnormal   Result Value Ref Range    Bacteria Urine Few (A) None Seen /HPF    RBC Urine 0-2 0-2 /HPF /HPF    WBC Urine 0-5 0-5 /HPF /HPF    Squamous Epithelials Urine Few (A) None Seen /LPF   Chlamydia trachomatis/Neisseria gonorrhoeae by PCR - Clinic Collect     Status: Abnormal    Specimen: Cervix; Swab   Result Value Ref Range    Chlamydia Trachomatis Positive (A) Negative    Neisseria gonorrhoeae Negative Negative   Wet prep - Clinic Collect     Status: Abnormal    Specimen: Vagina; Swab   Result Value Ref Range    Trichomonas Absent Absent    Yeast Absent Absent    Clue Cells Absent Absent    WBCs/high power field 1+ (A) None   Urine Culture Aerobic Bacterial - lab collect     Status: None    Specimen: Urine, Midstream   Result Value Ref Range    Culture No Growth    CBC with Platelets & Differential     Status: None     Narrative    The following orders were created for panel order CBC with Platelets & Differential.  Procedure                               Abnormality         Status                     ---------                               -----------         ------                     CBC with platelets and d...[[id number]]                      Final result                 Please view results for these tests on the individual orders.

## 2023-06-13 LAB
C TRACH DNA SPEC QL PROBE+SIG AMP: POSITIVE
N GONORRHOEA DNA SPEC QL NAA+PROBE: NEGATIVE

## 2023-06-14 LAB — BACTERIA UR CULT: NO GROWTH

## 2023-06-15 ENCOUNTER — TELEPHONE (OUTPATIENT)
Dept: FAMILY MEDICINE | Facility: CLINIC | Age: 38
End: 2023-06-15
Payer: COMMERCIAL

## 2023-06-15 DIAGNOSIS — A74.9 CHLAMYDIA: Primary | ICD-10-CM

## 2023-06-15 RX ORDER — DOXYCYCLINE HYCLATE 100 MG/1
1 TABLET, DELAYED RELEASE ORAL 2 TIMES DAILY
Qty: 14 TABLET | Refills: 0 | Status: SHIPPED | OUTPATIENT
Start: 2023-06-15 | End: 2023-06-15 | Stop reason: ALTCHOICE

## 2023-06-15 RX ORDER — AZITHROMYCIN 250 MG/1
1000 TABLET, FILM COATED ORAL ONCE
Qty: 4 TABLET | Refills: 0 | Status: CANCELLED | OUTPATIENT
Start: 2023-06-15 | End: 2023-06-15

## 2023-06-15 RX ORDER — AZITHROMYCIN 500 MG/1
1000 TABLET, FILM COATED ORAL ONCE
Qty: 2 TABLET | Refills: 0 | Status: SHIPPED | OUTPATIENT
Start: 2023-06-15 | End: 2023-06-15

## 2023-06-15 NOTE — TELEPHONE ENCOUNTER
Called patient and discussed results - she reportedly has had chlamydia in the past that has been treated with a one-time dose of IM antibiotics. She is requesting this instead as it is difficult for her to remain compliant with PO meds. Please let us know if this is an option and we can get her scheduled for an injection today or tomorrow.       ----- Message -----  From: Cody Oh MD  Sent: 6/15/2023   9:20 AM CDT  To: [facility] Family Medicine/Ob Support Pool    Please contact her and let her know that she tested positive for chlamydia and should be treated with doxycycline.  So should her partner/recent partners  I will call in the medication

## 2023-06-15 NOTE — TELEPHONE ENCOUNTER
Unfort, there is not IM option for chlamydia- unlike gonorrhea- but we could do 4 pills of azithromycin x 1 dose instead- not quite as effective, but good enough.  Should I call that in instead?

## 2023-06-15 NOTE — TELEPHONE ENCOUNTER
Attempted to call patient - left message for her to return call. If she calls back, please relay message below from Dr. Oh and inquire if she is ok with one-time dose of oral antibiotics.

## 2023-06-15 NOTE — TELEPHONE ENCOUNTER
Patient returned call - she confirms she is ok with azithromycin. She is also wondering what time frame she should return to repeat testing after her single dose of treatment.

## 2023-06-15 NOTE — TELEPHONE ENCOUNTER
Called in azithromycin to top pharmacy on the list  No need to chlamydia unless pregnant- or problem with med

## 2023-06-18 ASSESSMENT — ENCOUNTER SYMPTOMS
BRUISES/BLEEDS EASILY: 0
WEAKNESS: 0
SHORTNESS OF BREATH: 0
NERVOUS/ANXIOUS: 1
NEUROLOGICAL NEGATIVE: 1
ABDOMINAL PAIN: 0
ALLERGIC/IMMUNOLOGIC NEGATIVE: 1
COUGH: 0
RESPIRATORY NEGATIVE: 1
CONSTITUTIONAL NEGATIVE: 1
ROS SKIN COMMENTS: HAIR LOSS
CHILLS: 0
POLYDIPSIA: 0
FEVER: 0

## 2023-07-27 ENCOUNTER — VIRTUAL VISIT (OUTPATIENT)
Dept: FAMILY MEDICINE | Facility: CLINIC | Age: 38
End: 2023-07-27
Payer: COMMERCIAL

## 2023-07-27 DIAGNOSIS — J45.40 MODERATE PERSISTENT ASTHMA WITHOUT COMPLICATION: ICD-10-CM

## 2023-07-27 DIAGNOSIS — F41.1 GAD (GENERALIZED ANXIETY DISORDER): Primary | Chronic | ICD-10-CM

## 2023-07-27 PROCEDURE — 99214 OFFICE O/P EST MOD 30 MIN: CPT | Mod: VID | Performed by: STUDENT IN AN ORGANIZED HEALTH CARE EDUCATION/TRAINING PROGRAM

## 2023-07-27 RX ORDER — GABAPENTIN 100 MG/1
100 CAPSULE ORAL 3 TIMES DAILY
Qty: 90 CAPSULE | Refills: 1 | Status: SHIPPED | OUTPATIENT
Start: 2023-07-27 | End: 2023-08-03 | Stop reason: SINTOL

## 2023-07-27 RX ORDER — ALBUTEROL SULFATE 90 UG/1
2 AEROSOL, METERED RESPIRATORY (INHALATION) EVERY 6 HOURS PRN
Qty: 18 G | Refills: 3 | Status: SHIPPED | OUTPATIENT
Start: 2023-07-27

## 2023-07-27 RX ORDER — FLUTICASONE PROPIONATE 110 UG/1
1 AEROSOL, METERED RESPIRATORY (INHALATION) 2 TIMES DAILY
Qty: 12 G | Refills: 3 | Status: SHIPPED | OUTPATIENT
Start: 2023-07-27

## 2023-07-27 ASSESSMENT — PATIENT HEALTH QUESTIONNAIRE - PHQ9
SUM OF ALL RESPONSES TO PHQ QUESTIONS 1-9: 6
SUM OF ALL RESPONSES TO PHQ QUESTIONS 1-9: 6
10. IF YOU CHECKED OFF ANY PROBLEMS, HOW DIFFICULT HAVE THESE PROBLEMS MADE IT FOR YOU TO DO YOUR WORK, TAKE CARE OF THINGS AT HOME, OR GET ALONG WITH OTHER PEOPLE: VERY DIFFICULT

## 2023-07-27 ASSESSMENT — ASTHMA QUESTIONNAIRES: ACT_TOTALSCORE: 10

## 2023-07-27 NOTE — PROGRESS NOTES
"Armida is a 38 year old who is being evaluated via a billable video visit.      How would you like to obtain your AVS? MyChart  If the video visit is dropped, the invitation should be resent by: Text to cell phone: [phone]  Will anyone else be joining your video visit? No          Assessment & Plan     DEMETRI (generalized anxiety disorder)  Chronic, uncontrolled.  Patient reports that she has been on multiple SSRIs reports that she was on gabapentin we will start her on low-dose 3 times daily.  - gabapentin (NEURONTIN) 100 MG capsule  Dispense: 90 capsule; Refill: 1  - PRIMARY CARE FOLLOW-UP SCHEDULING  -RTC in 1 month for reevaluation    Moderate persistent asthma without complication  Chronic, uncontrolled.  ACT score 10 today..  We will add Flovent twice daily  - albuterol (PROAIR HFA/PROVENTIL HFA/VENTOLIN HFA) 108 (90 Base) MCG/ACT inhaler  Dispense: 18 g; Refill: 3  - fluticasone (FLOVENT HFA) 110 MCG/ACT inhaler  Dispense: 12 g; Refill: 3  - Miscellaneous Order for DME - ONLY FOR DME  - PRIMARY CARE FOLLOW-UP SCHEDULING  -RTC in 1 month for reevaluation      Review of external notes as documented elsewhere in note       BMI:   Estimated body mass index is 46.21 kg/m  as calculated from the following:    Height as of 6/12/23: 1.64 m (5' 4.57\").    Weight as of 6/12/23: 124.3 kg (274 lb).       Maribell Ingram MD  [facility]    Subjective   Armida is a 38 year old, presenting for the following health issues:  Recheck Medication        7/27/2023     4:30 PM   Additional Questions   Roomed by Tia       History of Present Illness       Reason for visit:  Medications    She eats 2-3 servings of fruits and vegetables daily.She consumes 0 sweetened beverage(s) daily.   She is taking medications regularly.         2/10/2020    12:00 PM 1/21/2022     3:24 PM 6/12/2023     8:43 AM   DEMETRI-7 SCORE   Total Score   14 (moderate anxiety)   Total Score 21 21 14           11/23/2022     3:19 PM 6/12/2023 "     8:30 AM 7/27/2023     4:34 PM   PHQ   PHQ-9 Total Score 10 14 6   Q9: Thoughts of better off dead/self-harm past 2 weeks Not at all Not at all Not at all       ACT score 10    Smoking  - knows she needs to quit weed    Anxiety  - taking gabapentin from a prior prescription  - was on Prozac, Zoloft, Paxil, and didn't like them, made her suicidal      Review of Systems   Constitutional: Negative for fever and chills.   Respiratory: Negative for cough or shortness of breath.    Cardiovascular: Negative for chest pain or chest pressure.   Gastrointestinal: Negative for nausea, vomiting, diarrhea or abdominal pain.        Objective           Vitals:  No vitals were obtained today due to virtual visit.    Physical Exam   GENERAL: Healthy, alert and no distress  EYES: Eyes grossly normal to inspection.  No discharge or erythema, or obvious scleral/conjunctival abnormalities.  RESP: No audible wheeze, cough, or visible cyanosis.  No visible retractions or increased work of breathing.    SKIN: Visible skin clear. No significant rash, abnormal pigmentation or lesions.  NEURO: Cranial nerves grossly intact.  Mentation and speech appropriate for age.  PSYCH: Mentation appears normal, affect normal/bright, judgement and insight intact, normal speech and appearance well-groomed.          Video-Visit Details    Type of service:  Video Visit   Video Start Time: 5pm  Video End Time:5:15pm    Originating Location (pt. Location): Home    Distant Location (provider location):  On-site  Platform used for Video Visit: Jose

## 2023-08-02 ENCOUNTER — TELEPHONE (OUTPATIENT)
Dept: FAMILY MEDICINE | Facility: CLINIC | Age: 38
End: 2023-08-02
Payer: COMMERCIAL

## 2023-08-02 NOTE — TELEPHONE ENCOUNTER
RN received call from patient inquiring why she was started on gabapentin.     Per virtual visit with Dr. Ingram 7/27/23: Patient reports that she has been on multiple SSRIs reports that she was on gabapentin we will start her on low-dose 3 times daily.     RN relayed information from 7/27 visit note. Patient states she has been reading about gabapentin since her visit and doesn't feel it is appropriate treatment for her PTSD/anxiety. Her symptoms have worsened recently and she would like to discuss alternate treatment options.    Scheduled for virtual visit with Dr. Ingram 8/3/23.

## 2023-08-03 ENCOUNTER — VIRTUAL VISIT (OUTPATIENT)
Dept: FAMILY MEDICINE | Facility: CLINIC | Age: 38
End: 2023-08-03
Payer: COMMERCIAL

## 2023-08-03 DIAGNOSIS — F31.81 BIPOLAR 2 DISORDER (H): Chronic | ICD-10-CM

## 2023-08-03 DIAGNOSIS — J45.40 MODERATE PERSISTENT ASTHMA WITHOUT COMPLICATION: Primary | ICD-10-CM

## 2023-08-03 DIAGNOSIS — F10.91 ALCOHOL USE DISORDER IN REMISSION: ICD-10-CM

## 2023-08-03 DIAGNOSIS — F43.10 POSTTRAUMATIC STRESS DISORDER: ICD-10-CM

## 2023-08-03 DIAGNOSIS — F41.1 GAD (GENERALIZED ANXIETY DISORDER): ICD-10-CM

## 2023-08-03 PROCEDURE — 99214 OFFICE O/P EST MOD 30 MIN: CPT | Mod: VID | Performed by: STUDENT IN AN ORGANIZED HEALTH CARE EDUCATION/TRAINING PROGRAM

## 2023-08-03 RX ORDER — ESCITALOPRAM OXALATE 10 MG/1
10 TABLET ORAL DAILY
Qty: 30 TABLET | Refills: 1 | Status: SHIPPED | OUTPATIENT
Start: 2023-08-03 | End: 2023-08-03

## 2023-08-03 RX ORDER — HYDROXYZINE HYDROCHLORIDE 10 MG/1
10 TABLET, FILM COATED ORAL 3 TIMES DAILY PRN
Qty: 30 TABLET | Refills: 1 | Status: SHIPPED | OUTPATIENT
Start: 2023-08-03 | End: 2023-11-15

## 2023-08-03 ASSESSMENT — ANXIETY QUESTIONNAIRES
1. FEELING NERVOUS, ANXIOUS, OR ON EDGE: SEVERAL DAYS
5. BEING SO RESTLESS THAT IT IS HARD TO SIT STILL: SEVERAL DAYS
IF YOU CHECKED OFF ANY PROBLEMS ON THIS QUESTIONNAIRE, HOW DIFFICULT HAVE THESE PROBLEMS MADE IT FOR YOU TO DO YOUR WORK, TAKE CARE OF THINGS AT HOME, OR GET ALONG WITH OTHER PEOPLE: NOT DIFFICULT AT ALL
3. WORRYING TOO MUCH ABOUT DIFFERENT THINGS: NEARLY EVERY DAY
7. FEELING AFRAID AS IF SOMETHING AWFUL MIGHT HAPPEN: SEVERAL DAYS
GAD7 TOTAL SCORE: 13
4. TROUBLE RELAXING: NEARLY EVERY DAY
2. NOT BEING ABLE TO STOP OR CONTROL WORRYING: NEARLY EVERY DAY
6. BECOMING EASILY ANNOYED OR IRRITABLE: SEVERAL DAYS
GAD7 TOTAL SCORE: 13

## 2023-08-03 ASSESSMENT — PATIENT HEALTH QUESTIONNAIRE - PHQ9
SUM OF ALL RESPONSES TO PHQ QUESTIONS 1-9: 12
SUM OF ALL RESPONSES TO PHQ QUESTIONS 1-9: 12
10. IF YOU CHECKED OFF ANY PROBLEMS, HOW DIFFICULT HAVE THESE PROBLEMS MADE IT FOR YOU TO DO YOUR WORK, TAKE CARE OF THINGS AT HOME, OR GET ALONG WITH OTHER PEOPLE: SOMEWHAT DIFFICULT

## 2023-08-03 NOTE — PROGRESS NOTES
"Armida is a 38 year old who is being evaluated via a billable video visit.      How would you like to obtain your AVS? MyChart  If the video visit is dropped, the invitation should be resent by: Send to e-mail at: [email or web address]  Will anyone else be joining your video visit? No          Assessment & Plan     Moderate persistent asthma without complication  DEMETRI (generalized anxiety disorder)  Bipolar 2 disorder (H)  PTSD  On Paxil, Zoloft, Prozac in the past and felt that it worsened her self-harm thoughts. Trying to go back to therapy. Has PTSD, does not think that she has Bipolar, does not know why it on her list. However, does feel like she has stages that she wants to have sex often and spend a lot of money, has chronic difficulty sleeping. Will refer to therapy and psychiatry.  - hydrOXYzine (ATARAX) 10 MG tablet  Dispense: 30 tablet; Refill: 1  - Adult Mental Health  Referral  - Adult Mental Health  Referral  - will message pharmacist regarding possibly starting abilify vs depakote    Alcohol use disorder in remission  Was using Gabapentin TID, however, causing head pain. Instructed to stop.   - consider naltrexone      Review of external notes as documented elsewhere in note  35 mins spent chart reviewing, face to face, and documentation.     BMI:   Estimated body mass index is 46.21 kg/m  as calculated from the following:    Height as of 6/12/23: 1.64 m (5' 4.57\").    Weight as of 6/12/23: 124.3 kg (274 lb).     Maribell Ingram MD  [facility]    Subjective   Armida is a 38 year old, presenting for the following health issues:  Recheck Medication (Feels it's not working , has questions )      [facility]         6/12/2023     8:30 AM 7/27/2023     4:34 PM 8/3/2023     4:20 PM   PHQ   PHQ-9 Total Score 14 6 12   Q9: Thoughts of better off dead/self-harm past 2 weeks Not at all Not at all Not at all         1/21/2022     3:24 PM 6/12/2023     8:43 AM 8/3/2023     4:21 PM "   DEMETRI-7 SCORE   Total Score  14 (moderate anxiety) 13 (moderate anxiety)   Total Score 21 14 13     On Paxil and Zoloft before, Prozac   Trying to go back to therapy - missing appts and kids appts, has a lot of responsibilities  Has PTSD, does not think that she has Bipolar, does not know why it on her list. However, does feel like she has stages that she wants to have sex often and spend a lot of money, has chronic difficulty sleeping.         Review of Systems   Constitutional: Negative for fever and chills.   Respiratory: Negative for cough or shortness of breath.    Cardiovascular: Negative for chest pain or chest pressure.   Gastrointestinal: Negative for nausea, vomiting, diarrhea or abdominal pain.        Objective           Vitals:  No vitals were obtained today due to virtual visit.    Physical Exam   GENERAL: Healthy, alert and no distress  EYES: Eyes grossly normal to inspection.  No discharge or erythema, or obvious scleral/conjunctival abnormalities.  RESP: No audible wheeze, cough, or visible cyanosis.  No visible retractions or increased work of breathing.    SKIN: Visible skin clear. No significant rash, abnormal pigmentation or lesions.  NEURO: Cranial nerves grossly intact.  Mentation and speech appropriate for age.  PSYCH: Mentation appears normal, affect normal/bright, judgement and insight intact, normal speech and appearance well-groomed.            Video-Visit Details    Type of service:  Video Visit   Video Start Time: 5:10pm  Video End Time:5:40pm    Originating Location (pt. Location): Home    Distant Location (provider location):  On-site  Platform used for Video Visit: [employer]    Answers submitted by the patient for this visit:  Patient Health Questionnaire (Submitted on 8/3/2023)  If you checked off any problems, how difficult have these problems made it for you to do your work, take care of things at home, or get along with other people?: Somewhat difficult  PHQ9 TOTAL SCORE: 12  DEMETRI-7  (Submitted on 8/3/2023)  DEMETRI 7 TOTAL SCORE: 13

## 2023-08-03 NOTE — PROGRESS NOTES
DEMETRI (generalized anxiety disorder)  Chronic, uncontrolled.  Patient reports that she has been on multiple SSRIs reports that she was on gabapentin we will start her on low-dose 3 times daily.  - gabapentin (NEURONTIN) 100 MG capsule  Dispense: 90 capsule; Refill: 1  - PRIMARY CARE FOLLOW-UP SCHEDULING  -RTC in 1 month for reevaluation     Moderate persistent asthma without complication  Chronic, uncontrolled.  ACT score 10 today..  We will add Flovent twice daily  - albuterol (PROAIR HFA/PROVENTIL HFA/VENTOLIN HFA) 108 (90 Base) MCG/ACT inhaler  Dispense: 18 g; Refill: 3  - fluticasone (FLOVENT HFA) 110 MCG/ACT inhaler  Dispense: 12 g; Refill: 3  - Miscellaneous Order for DME - ONLY FOR DME  - PRIMARY CARE FOLLOW-UP SCHEDULING  -RTC in 1 month for reevaluation

## 2023-08-04 RX ORDER — LAMOTRIGINE 25 MG/1
TABLET ORAL
Qty: 98 TABLET | Refills: 0 | Status: SHIPPED | OUTPATIENT
Start: 2023-08-04 | End: 2023-09-15

## 2023-08-04 RX ORDER — LAMOTRIGINE 100 MG/1
100 TABLET ORAL 2 TIMES DAILY
Qty: 60 TABLET | Refills: 1 | Status: SHIPPED | OUTPATIENT
Start: 2023-08-04 | End: 2023-11-15

## 2023-08-04 NOTE — PROGRESS NOTES
Female of child bearing age, is on nexplanon, but for added safety, will avoid potentially teratogenic agents. Start Lamotrigine 25 mg daily x 2 weeks, then twice daily x 2 weeks, then 2 tabs (50 mg) x 2 weeks. Then switch to Lamotrigine 100 mg and use twice daily.     LVM for patient explaining directions.

## 2023-08-08 ENCOUNTER — VIRTUAL VISIT (OUTPATIENT)
Dept: PSYCHOLOGY | Facility: CLINIC | Age: 38
End: 2023-08-08
Attending: STUDENT IN AN ORGANIZED HEALTH CARE EDUCATION/TRAINING PROGRAM
Payer: COMMERCIAL

## 2023-08-08 ENCOUNTER — TELEPHONE (OUTPATIENT)
Dept: PSYCHOLOGY | Facility: CLINIC | Age: 38
End: 2023-08-08

## 2023-08-08 DIAGNOSIS — F43.10 POSTTRAUMATIC STRESS DISORDER: Primary | ICD-10-CM

## 2023-08-08 DIAGNOSIS — J45.40 MODERATE PERSISTENT ASTHMA WITHOUT COMPLICATION: ICD-10-CM

## 2023-08-08 PROCEDURE — 90834 PSYTX W PT 45 MINUTES: CPT | Mod: 95

## 2023-08-08 ASSESSMENT — COLUMBIA-SUICIDE SEVERITY RATING SCALE - C-SSRS
TOTAL  NUMBER OF INTERRUPTED ATTEMPTS LIFETIME: NO
2. HAVE YOU ACTUALLY HAD ANY THOUGHTS OF KILLING YOURSELF?: YES
3. HAVE YOU BEEN THINKING ABOUT HOW YOU MIGHT KILL YOURSELF?: YES
4. HAVE YOU HAD THESE THOUGHTS AND HAD SOME INTENTION OF ACTING ON THEM?: NO
REASONS FOR IDEATION LIFETIME: COMPLETELY TO END OR STOP THE PAIN (YOU COULDN'T GO ON LIVING WITH THE PAIN OR HOW YOU WERE FEELING)
ATTEMPT LIFETIME: NO
6. HAVE YOU EVER DONE ANYTHING, STARTED TO DO ANYTHING, OR PREPARED TO DO ANYTHING TO END YOUR LIFE?: NO
2. HAVE YOU ACTUALLY HAD ANY THOUGHTS OF KILLING YOURSELF?: NO
1. HAVE YOU WISHED YOU WERE DEAD OR WISHED YOU COULD GO TO SLEEP AND NOT WAKE UP?: YES
TOTAL  NUMBER OF ABORTED OR SELF INTERRUPTED ATTEMPTS LIFETIME: NO
5. HAVE YOU STARTED TO WORK OUT OR WORKED OUT THE DETAILS OF HOW TO KILL YOURSELF? DO YOU INTEND TO CARRY OUT THIS PLAN?: NO
1. IN THE PAST MONTH, HAVE YOU WISHED YOU WERE DEAD OR WISHED YOU COULD GO TO SLEEP AND NOT WAKE UP?: NO
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
## 2023-08-16 ENCOUNTER — TELEPHONE (OUTPATIENT)
Dept: PSYCHOLOGY | Facility: CLINIC | Age: 38
End: 2023-08-16

## 2023-08-16 NOTE — TELEPHONE ENCOUNTER
Provider called pt for a brief check in between appts but the pt had another obligation. Provider and pt planned to check in on 8/17/23. Next visit is scheduled for 8/30/23.

## 2023-08-17 ENCOUNTER — TELEPHONE (OUTPATIENT)
Dept: PSYCHOLOGY | Facility: CLINIC | Age: 38
End: 2023-08-17

## 2023-08-17 NOTE — TELEPHONE ENCOUNTER
"Provider called to check in with pt between visits. Pt reported her depression \"has been making it hard to get out of bed.\" Pt also reported she has still been able to fulfill her roles at home and work with some difficulty since the last visit. Provider confirmed the next visit with pt on 8/30/23.  "

## 2023-08-30 ENCOUNTER — VIRTUAL VISIT (OUTPATIENT)
Dept: PSYCHOLOGY | Facility: CLINIC | Age: 38
End: 2023-08-30
Payer: COMMERCIAL

## 2023-08-30 DIAGNOSIS — F41.1 GENERALIZED ANXIETY DISORDER: Primary | ICD-10-CM

## 2023-08-30 PROCEDURE — 90834 PSYTX W PT 45 MINUTES: CPT | Mod: 95

## 2023-08-30 NOTE — PROGRESS NOTES
[facility]                                     Progress Note    Patient Name: Armida Quiñonez  Date: 8/30/2023         Service Type: Individual      Session Start Time: 12:36 pm  Session End Time: 1:22 pm     Session Length: 46    Session #: 2    Attendees: Client    Service Modality:  Video Visit:      Provider verified identity through the following two step process.  Patient provided:  Patient photo    Telemedicine Visit: The patient's condition can be safely assessed and treated via synchronous audio and visual telemedicine encounter.      Reason for Telemedicine Visit: Patient has requested telehealth visit    Originating Site (Patient Location): Patient's home    Distant Site (Provider Location): [facility] HEALTH & [facility]DI[facility] [place] [facility]    Consent:  The patient/guardian has verbally consented to: the potential risks and benefits of telemedicine (video visit) versus in person care; bill my insurance or make self-payment for services provided; and responsibility for payment of non-covered services.     Patient would like the video invitation sent by:  Text to cell phone: [phone]    Mode of Communication:  Video Conference via Amwell    Distant Location (Provider):  Off-site    As the provider I attest to compliance with applicable laws and regulations related to telemedicine.    DATA  Interactive Complexity: No  Crisis: No        Progress Since Last Session (Related to Symptoms / Goals / Homework):   Symptoms: No change pt reports she is still experiencing     Homework: Did not complete. Pt would like more time to complete [place] ppw.      Episode of Care Goals: Satisfactory progress - CONTEMPLATION (Considering change and yet undecided); Intervened by assessing the negative and positive thinking (ambivalence) about behavior change     Current / Ongoing Stressors and Concerns:   Pt reported that her symptoms of anxiety are related to her work and finances.  Pt reports she needs two jobs to be able to afford her bills and that she is often behind on her bills. Pt also reports that her depression symptoms make it difficult to want to get out of bed for work. Pt reports she has been able to continue working but feels she is constantly struggling to fulfill her job roles, show up for her kids, and finish her GED classes.      Treatment Objective(s) Addressed in This Session:   identify at least 1 triggers for anxiety  Pt reported her finances are a trigger for her anxiety.     Intervention:   Solution Focused: therapist provided education on how mental and physical health are linked.       ASSESSMENT: Current Emotional / Mental Status (status of significant symptoms):   Risk status (Self / Other harm or suicidal ideation)   Patient reports the following current fears or concerns for personal safety: unsafe neighborhood.   Patient denies current or recent suicidal ideation or behaviors.   Patient denies current or recent homicidal ideation or behaviors.   Patient denies current or recent self injurious behavior or ideation.   Patient denies other safety concerns.   Patient reports there has been no change in risk factors since their last session.     Patient reports there has been no change in protective factors since their last session.     Recommended that patient call 911 or go to the local ED should there be a change in any of these risk factors.     Appearance:   Appropriate    Eye Contact:   Fair    Psychomotor Behavior: Restless    Attitude:   Cooperative  Guarded  Rehan   Orientation:   All   Speech    Rate / Production: Talkative    Volume:  Normal    Mood:    Ambivalence   Affect:    Appropriate    Thought Content:  Clear    Thought Form:  Coherent    Insight:    Good      Medication Review:   No changes to current psychiatric medication(s)     Medication Compliance:   No. Pt reports she does not like how Lamictal makes her feel and has not been taking it.  Provider encouraged pt to make an appointment with a PCP or psychiatry for a medication check as a way to increase medication compliance.     Changes in Health Issues:   None reported     Chemical Use Review:   Substance Use: Problem use continues with no change since last session, Stage of Change: Contemplation        Tobacco Use: No change in amount of tobacco use since last session.  Patient declined discussion at this time    Diagnosis:  1. Generalized anxiety disorder        Collateral Reports Completed:   Not Applicable    PLAN: (Patient Tasks / Therapist Tasks / Other)  Continue diagnostic assessment. Continue seeing this provider for therapy sessions. Pt plans to remain open to making an appointment with a PCP or psychiatry for a medication check in order to increase medication compliance.           Mavis Staples  August 30, 2023     This note has been reviewed and I agree with the plan of care. This note is co-signed by SHIRA Winkler, [facility], Supervisor, on: 9/6/23

## 2023-09-07 ENCOUNTER — OFFICE VISIT (OUTPATIENT)
Dept: FAMILY MEDICINE | Facility: CLINIC | Age: 38
End: 2023-09-07
Payer: COMMERCIAL

## 2023-09-07 VITALS
OXYGEN SATURATION: 100 % | HEART RATE: 83 BPM | WEIGHT: 268.3 LBS | DIASTOLIC BLOOD PRESSURE: 75 MMHG | BODY MASS INDEX: 45.25 KG/M2 | TEMPERATURE: 98.5 F | SYSTOLIC BLOOD PRESSURE: 119 MMHG | RESPIRATION RATE: 16 BRPM

## 2023-09-07 DIAGNOSIS — N89.8 VAGINAL DISCHARGE: ICD-10-CM

## 2023-09-07 DIAGNOSIS — Z11.3 ENCOUNTER FOR SCREENING EXAMINATION FOR SEXUALLY TRANSMITTED DISEASE: ICD-10-CM

## 2023-09-07 DIAGNOSIS — B96.89 BACTERIAL VAGINOSIS: Primary | ICD-10-CM

## 2023-09-07 DIAGNOSIS — R30.0 DYSURIA: ICD-10-CM

## 2023-09-07 DIAGNOSIS — N76.0 BACTERIAL VAGINOSIS: Primary | ICD-10-CM

## 2023-09-07 LAB
ALBUMIN UR-MCNC: NEGATIVE MG/DL
APPEARANCE UR: CLEAR
BILIRUB UR QL STRIP: NEGATIVE
CLUE CELLS: PRESENT
COLOR UR AUTO: YELLOW
GLUCOSE UR STRIP-MCNC: NEGATIVE MG/DL
HGB UR QL STRIP: NEGATIVE
KETONES UR STRIP-MCNC: ABNORMAL MG/DL
LEUKOCYTE ESTERASE UR QL STRIP: NEGATIVE
NITRATE UR QL: NEGATIVE
PH UR STRIP: 7 [PH] (ref 5–8)
SP GR UR STRIP: >=1.03 (ref 1–1.03)
TRICHOMONAS, WET PREP: ABNORMAL
UROBILINOGEN UR STRIP-ACNC: 1 E.U./DL
WBC'S/HIGH POWER FIELD, WET PREP: ABNORMAL
YEAST, WET PREP: ABNORMAL

## 2023-09-07 PROCEDURE — 87210 SMEAR WET MOUNT SALINE/INK: CPT | Performed by: PHYSICIAN ASSISTANT

## 2023-09-07 PROCEDURE — 87389 HIV-1 AG W/HIV-1&-2 AB AG IA: CPT | Performed by: PHYSICIAN ASSISTANT

## 2023-09-07 PROCEDURE — 87491 CHLMYD TRACH DNA AMP PROBE: CPT | Performed by: PHYSICIAN ASSISTANT

## 2023-09-07 PROCEDURE — 86803 HEPATITIS C AB TEST: CPT | Performed by: PHYSICIAN ASSISTANT

## 2023-09-07 PROCEDURE — 36415 COLL VENOUS BLD VENIPUNCTURE: CPT | Performed by: PHYSICIAN ASSISTANT

## 2023-09-07 PROCEDURE — 87340 HEPATITIS B SURFACE AG IA: CPT | Performed by: PHYSICIAN ASSISTANT

## 2023-09-07 PROCEDURE — 99214 OFFICE O/P EST MOD 30 MIN: CPT | Performed by: PHYSICIAN ASSISTANT

## 2023-09-07 PROCEDURE — 87591 N.GONORRHOEAE DNA AMP PROB: CPT | Performed by: PHYSICIAN ASSISTANT

## 2023-09-07 PROCEDURE — 86706 HEP B SURFACE ANTIBODY: CPT | Performed by: PHYSICIAN ASSISTANT

## 2023-09-07 PROCEDURE — 86780 TREPONEMA PALLIDUM: CPT | Performed by: PHYSICIAN ASSISTANT

## 2023-09-07 PROCEDURE — 81003 URINALYSIS AUTO W/O SCOPE: CPT | Performed by: PHYSICIAN ASSISTANT

## 2023-09-07 RX ORDER — METRONIDAZOLE 500 MG/1
500 TABLET ORAL 2 TIMES DAILY
Qty: 14 TABLET | Refills: 0 | Status: SHIPPED | OUTPATIENT
Start: 2023-09-07 | End: 2023-09-15
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
## 2023-09-08 ENCOUNTER — VIRTUAL VISIT (OUTPATIENT)
Dept: PSYCHOLOGY | Facility: CLINIC | Age: 38
End: 2023-09-08
Payer: COMMERCIAL

## 2023-09-08 DIAGNOSIS — F43.10 POSTTRAUMATIC STRESS DISORDER: ICD-10-CM

## 2023-09-08 DIAGNOSIS — F33.2 MAJOR DEPRESSIVE DISORDER, RECURRENT EPISODE, SEVERE WITH MIXED FEATURES (H): Primary | ICD-10-CM

## 2023-09-08 LAB
C TRACH DNA SPEC QL PROBE+SIG AMP: NEGATIVE
HBV SURFACE AB SERPL IA-ACNC: 3.39 M[IU]/ML
HBV SURFACE AB SERPL IA-ACNC: NONREACTIVE M[IU]/ML
HBV SURFACE AG SERPL QL IA: NONREACTIVE
HCV AB SERPL QL IA: NONREACTIVE
HIV 1+2 AB+HIV1 P24 AG SERPL QL IA: NONREACTIVE
N GONORRHOEA DNA SPEC QL NAA+PROBE: NEGATIVE
T PALLIDUM AB SER QL: NONREACTIVE

## 2023-09-08 PROCEDURE — 90791 PSYCH DIAGNOSTIC EVALUATION: CPT | Mod: 95

## 2023-09-08 NOTE — PATIENT INSTRUCTIONS
Safety Plan:  Adult Short Safety Plan:   Name: Armida Quiñonez  YOB: 1985  Date: September 7, 2023   My primary care provider: Fatemeh silva  My primary care clinic: [facility]   My prescriber: Maribell Ingram  Other care team support:  Moriah Adler Ali   My Triggers:  Relationship conflict in intimate relationships, Work  difficulties : stress from workload, School concerns : stress from workload, Financial concerns : trouble paying bills, Housing concerns : trouble paying rent, and Medical Health : coping with medical concerns     Additional People, Places, and Things that I can access for support: therapist, therapy sessions, and watching [email or web address]          What is important to me and makes life worth living: kids, motivation for future .         GREEN    Good Control  1. I feel good  2. No suicidal thoughts   3. Can work, sleep and play   4. Little to no struggle to get out of bed in the morning     Action Steps  1. Self-care: balanced meals, exercising, sleep practices, etc.  2. Take your medications as prescribed.  3. Continue meetings with therapist and prescriber.  4.  Do the healthy things that I enjoy.             YELLOW  Getting Worse  I have ANY of these:  1. I do not feel good  2. Difficulty Concentrating  3. Sleep is changing  4. Increase/Change in my thoughts to hurt self and/or others, but I can still manage and not act on it.   5. Not taking care of self.  6. Struggle greatly to get out of bed in the morning or not able to make it to obligations             Action Steps (in addition to the above):  1. Inform your therapist and psychiatric prescriber/PCP.  2. Keep taking your medications as prescribed.    3. Turn to people you can ask for help.  4. Use internal coping strategies -see below.  5. Create safe environment: notify friends/family of increase in symptoms  6. Spend time with  animals.           RED  Get Help  If I have ANY of these:  1. Current and  uncontrollable thoughts and/or behaviors to hurt self and/or others.      Actions to manage my safety  1. Contact your emergency person Naida Fermin  2. Call or Text [phone]  3. Call my crisis team- [facility] [phone] [facility]  3. Or Call [phone]   4. Or go to the emergency room at [facility] right away        My Internal Coping Strategies include the following:  play with my pet and watch [employer]

## 2023-09-08 NOTE — PATIENT INSTRUCTIONS
Avoid alcohol while on the antibiotic to prevent undesirable side effects  The STI labs will come back in a couple of days  We will call you if results are abnormal  Results will be available on [employer]

## 2023-09-09 NOTE — PROGRESS NOTES
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 associated with the trauma.      - Efforts to avoid activities, places, or people that arouse recollections of the trauma.      - Markedly diminished interest or participation in significant activities.      - Feeling of detachment or estrangement from others.   D. Persistent symptoms of increased arousal (not present before the trauma), as indicated by two (or more) of the following:     - Difficulty falling or staying asleep.      - Irritability or outbursts of anger.      - Difficulty concentrating.      - Hypervigilance.      - Exaggerated startle response.   E. Duration of the disturbance is more than 1 month.  F. The disturbance causes clinically significant distress or impairment in social, occupational, or other important areas of functioning.    - The aformentioned symptoms began 30 year(s) ago and occurs 7 days per week and is experienced as severe.    Functional Status:  Patient reports the following functional impairments:  academic performance, management of the household and or completion of tasks, relationship(s), self-care, and work / vocational responsibilities.     Nonprogrammatic care:  Patient is requesting basic services to address current mental health concerns.    Clinical  Summary:  1. Reason for assessment: establishing care with this provider.  2. Psychosocial, Cultural and Contextual Factors: lives in own home, housing is currently stable  .  3. Principal DSM5 Diagnoses  (Sustained by DSM5 Criteria Listed Above):   296.33 (F33.2) Major Depressive Disorder, Recurrent Episode, Severe _ and With mixed features  309.81 (F43.10) Posttraumatic Stress Disorder (includes Posttraumatic Stress Disorder for Children 6 Years and Younger)  With dissociative symptoms.  4. Prognosis: Expect Improvement and Relieve Acute Symptoms.  5. Likely consequences of symptoms if not treated: deterioration.  6. Client strengths include:  creative, employed, goal-focused, has a previous history of therapy, insightful, intelligent, motivated, open to learning, open to suggestions / feedback, and responsible parent .     Recommendations:     1. Plan for Safety and Risk Management:   Safety and Risk: A safety and risk management plan has been developed including: When the patient identifies the following:  Suicidal Ideation Without method, intent, plan, or behavior (Yes to C-SSRS Suicidal Ideation #1 or #2 and No to #3,4,5)    The following is recommended:   Complete/Review/Update Safety Plan    Safety Plan:  Adult Short Safety Plan:   Name: Armida Quiñonez  YOB: 1985  Date: September 7, 2023   My primary care provider: Fatemeh silva  My primary care clinic: [facility]   My prescriber: Maribell Ingram  Other care team support:  Moriah Adler Ali   My Triggers:  Relationship conflict in intimate relationships, Work  difficulties : stress from workload, School concerns : stress from workload, Financial concerns : trouble paying bills, Housing concerns : trouble paying rent, and Medical Health : coping with medical concerns     Additional People, Places, and Things that I can access for support: therapist, therapy sessions, and watching tik-tok          What is important to me and  makes life worth living: kids, motivation for future .         GREEN    Good Control  1. I feel good  2. No suicidal thoughts   3. Can work, sleep and play   4. Little to no struggle to get out of bed in the morning     Action Steps  1. Self-care: balanced meals, exercising, sleep practices, etc.  2. Take your medications as prescribed.  3. Continue meetings with therapist and prescriber.  4.  Do the healthy things that I enjoy.             YELLOW  Getting Worse  I have ANY of these:  1. I do not feel good  2. Difficulty Concentrating  3. Sleep is changing  4. Increase/Change in my thoughts to hurt self and/or others, but I can still manage and not act on it.   5. Not taking care of self.  6. Struggle greatly to get out of bed in the morning or not able to make it to obligations             Action Steps (in addition to the above):  1. Inform your therapist and psychiatric prescriber/PCP.  2. Keep taking your medications as prescribed.    3. Turn to people you can ask for help.  4. Use internal coping strategies -see below.  5. Create safe environment: notify friends/family of increase in symptoms  6. Spend time with  animals.           RED  Get Help  If I have ANY of these:  1. Current and uncontrollable thoughts and/or behaviors to hurt self and/or others.      Actions to manage my safety  1. Contact your emergency person Naida Fermin  2. Call or Text [phone]  3. Call my crisis team- [facility] [phone] [facility]  3. Or Call 911   4. Or go to the emergency room at [facility] right away        My Internal Coping Strategies include the following:  play with my pet and watch tiktok    [End for Brief Safety Plan]     Safety Concerns  How To Identify Situations That Make Your Mental Health Worse:  Triggers are things that make your mental health worse.  Look at the list below to help you find your triggers and what you can do about them.     1. Identify Early Warning  Signs:    Sometimes symptoms return, even when people do their best to stay well. Symptoms can develop over a short period of time with little or no warning, but most of the time they emerge gradually over several weeks.  Early warning signs are changes that people experience when a relapse is starting. Some early warning signs are common and others are not as common.   Common Early Warning Signs:    Feeling tense or nervous, Eating less or eating more, Trouble sleeping -either too much or too little sleep, Feeling depressed or low, Feeling irritable, Feeling like not being around other people, Trouble concentrating, Urges to harm self, and Urges to use drugs or alcohol     2. Identify action steps to take when warning signs are noticed:    Taking Action- It is important to take action if you are experiencing early warning signs of a relapse.  The faster you act, the more likely it is that you can avoid a full relapse.  It is helpful to identify several specific ways to cope with symptoms.      The following is my list of symptoms and coping strategies that I can use when they are present:    Symptom Coping Strategies   Anxiety -Talk with someone in your support system and let him or her know how you are feeling.  -Use relaxation techniques such as deep breathing or imagery.  -Use positive affirmations to counteract negative self-talk such as  I am learning to let go of worry.    Depression - Schedule your day; include activities you have to do and activities you enjoy doing.  - Get some exercise - walk, run, bike, or swim.  - Give yourself credit for even the smallest things you get done.   Sleep Difficulties   - Go to sleep at the same time every day.  - Do something relaxing before bed, such as drinking herbal tea or listening to music.  - Avoid having discussions about upsetting topics before going to bed.   Delusions   - Distract yourself from the disturbing thought by doing something that requires your  attention such as a puzzle.  - Check out your beliefs by talking to someone you trust.    Hallucinations   - Use headphones to listen to music.  - Tell voices to  stop  or say to yourself,  I am safe.   - Ignore the hallucinations as much as possible; focus on other things.   Concentration Difficulties - Minimize distractions so there is only one thing for you to focus on at a time.    - Ask the person you are having a conversation with to slow down or repeat things you are unsure of.         .  Patient consented to co-developed safety plan.  Safety and risk management plan was completed.  Patient agreed to use safety plan should any safety concerns arise.  A copy was given to the patient..          Report to child / adult protection services was NA.     3. Initial Treatment will focus on:    Depressed Mood - increasing interest and pleasure in activities .   PTSD symptoms - use of grounding techniques in order to increase functioning in times of high stress.     4. Resources/Service Plan:    services are not indicated.   Modifications to assist communication are not indicated.   Additional disability accommodations are not indicated.    7. RACHELLE:    RACHELLE:  Discussed the general effects of drugs and alcohol on health and well-being. Provider gave patient printed information about the  effects of chemical use on their health and well being. Recommendations:  Provider recommended the client try community support such as virtual SMART recovery groups. Provider will continue to assess for substance use concerns with this patient in future sessions.     8. Records:   These were reviewed at time of assessment.   Information in this assessment was obtained from the medical record and  provided by patient who is a good historian.    Patient will have open access to their mental health medical record.    9.   Interactive Complexity: No    Provider Name/ Credentials:  SHIRA Whittaker, TAMMY  August 8, 2023  This note  has been reviewed and I agree with the plan of care. This note is co-signed by Jayne Leary, [facility] Center[facility] Supervisor, on: September 18, 2023

## 2023-09-13 ENCOUNTER — VIRTUAL VISIT (OUTPATIENT)
Dept: PSYCHIATRY | Facility: CLINIC | Age: 38
End: 2023-09-13
Attending: STUDENT IN AN ORGANIZED HEALTH CARE EDUCATION/TRAINING PROGRAM
Payer: COMMERCIAL

## 2023-09-13 DIAGNOSIS — F31.81 BIPOLAR 2 DISORDER (H): Chronic | ICD-10-CM

## 2023-09-13 DIAGNOSIS — J45.40 MODERATE PERSISTENT ASTHMA WITHOUT COMPLICATION: ICD-10-CM

## 2023-09-13 DIAGNOSIS — F41.1 GAD (GENERALIZED ANXIETY DISORDER): ICD-10-CM

## 2023-09-13 PROCEDURE — 99204 OFFICE O/P NEW MOD 45 MIN: CPT | Mod: VID | Performed by: PSYCHIATRY & NEUROLOGY

## 2023-09-13 ASSESSMENT — ANXIETY QUESTIONNAIRES
2. NOT BEING ABLE TO STOP OR CONTROL WORRYING: NEARLY EVERY DAY
GAD7 TOTAL SCORE: 16
1. FEELING NERVOUS, ANXIOUS, OR ON EDGE: NEARLY EVERY DAY
GAD7 TOTAL SCORE: 16
7. FEELING AFRAID AS IF SOMETHING AWFUL MIGHT HAPPEN: SEVERAL DAYS
IF YOU CHECKED OFF ANY PROBLEMS ON THIS QUESTIONNAIRE, HOW DIFFICULT HAVE THESE PROBLEMS MADE IT FOR YOU TO DO YOUR WORK, TAKE CARE OF THINGS AT HOME, OR GET ALONG WITH OTHER PEOPLE: VERY DIFFICULT
4. TROUBLE RELAXING: NEARLY EVERY DAY
5. BEING SO RESTLESS THAT IT IS HARD TO SIT STILL: NEARLY EVERY DAY
6. BECOMING EASILY ANNOYED OR IRRITABLE: NOT AT ALL
3. WORRYING TOO MUCH ABOUT DIFFERENT THINGS: NEARLY EVERY DAY

## 2023-09-13 ASSESSMENT — PATIENT HEALTH QUESTIONNAIRE - PHQ9
SUM OF ALL RESPONSES TO PHQ QUESTIONS 1-9: 7
SUM OF ALL RESPONSES TO PHQ QUESTIONS 1-9: 7
10. IF YOU CHECKED OFF ANY PROBLEMS, HOW DIFFICULT HAVE THESE PROBLEMS MADE IT FOR YOU TO DO YOUR WORK, TAKE CARE OF THINGS AT HOME, OR GET ALONG WITH OTHER PEOPLE: SOMEWHAT DIFFICULT

## 2023-09-13 NOTE — PATIENT INSTRUCTIONS
"The medication is atomoxetine - I would usually start 25 mg, but if we want to go more cautiously we can start at 18mg. Send a medical message if you would like to try it.     Patient Education   Collaborative Care Psychiatry Service  What to Expect  Here's what to expect from your Collaborative Care Psychiatry Service (CCPS).   About CCPS  CCPS means 2 people work together to help you get better. You'll meet with a behavioral health clinician and a psychiatric doctor. A behavioral health clinician helps people with mental health problems by talking with them. A psychiatric doctor helps people by giving them medicine.  How it works  At every visit, you'll see the behavioral health clinician (BHC) first. They'll talk with you about how you're doing and teach you how to feel better.   Then you'll see the psychiatric doctor. This doctor can help you deal with troubling thoughts and feelings by giving you medicine. They'll make sure you know the plan for your care.   CCPS usually takes 3 to 6 visits. If you need more visits, we may have you start seeing a different psychiatric doctor for ongoing care.  If you have any questions or concerns, we'll be glad to talk with you.  About visits  Be open  At your visits, please talk openly about your problems. It may feel hard, but it's the best way for us to help you.  Cancelling visits  If you can't come to your visit, please call us right away at [phone]. If you don't cancel at least 24 hours (1 full day) before your visit, that's \"late cancellation.\"  Being late to visits  Being very late is the same as not showing up. You will be a \"no show\" if:  Your appointment starts with a BHC, and you're more than 15 minutes late for a 30-minute (half hour) visit. This will also cancel your appointment with the psychiatric doctor.  Your appointment is with a psychiatric doctor only, and you're more than 15 minutes late for a 30-minute (half hour) visit.  Your appointment is with " a psychiatric doctor only, and you're more than 30 minutes late for a 60-minute (full hour) visit.  If you cancel late or don't show up 2 times within 6 months, we may end your care.   Getting help between visits  If you need help between visits, you can call us Monday to Friday from 8 a.m. to 4:30 p.m. at [phone].  Emergency care  Call 911 or go to the nearest emergency department if your life or someone else's life is in danger.  Call 988 anytime to reach the national Suicide and Crisis hotline.  Medicine refills  To refill your medicine, call your pharmacy. You can also call [facility]'s Behavioral Access at [phone], Monday to Friday, 8 a.m. to 4:30 p.m. It can take 1 to 3 business days to get a refill.   Forms, letters, and tests  You may have papers to fill out, like FMLA, short-term disability, and workability. We can help you with these forms at your visits, but you must have an appointment. You may need more than 1 visit for this, to be in an intensive therapy program, or both.  Before we can give you medicine for ADHD, we may refer you to get tested for it or confirm it another way.  We may not be able to give you an emotional support animal letter.  We don't do mental health checks ordered by the court.   We don't do mental health testing, but we can refer you to get tested.   Thank you for choosing us for your care.  For informational purposes only. Not to replace the advice of your health care provider. Copyright   2022 [facility]. All rights reserved. [employer] [id number] - 12/22.

## 2023-09-13 NOTE — NURSING NOTE
Is the patient currently in the state of [place]? YES    Visit mode:VIDEO    If the visit is dropped, the patient can be reconnected by: VIDEO VISIT: Text to cell phone:   Telephone Information:   Mobile [phone]       Will anyone else be joining the visit? NO  (If patient encounters technical issues they should call [phone])    How would you like to obtain your AVS? MyChart    Are changes needed to the allergy or medication list? No    Reason for visit: Consult    Kalli Carter F    Care team has reviewed attendance agreement with patient. Patient advised that two failed appointments within 6 months may lead to termination of current episode of care.      PHQ and DEMETRI complete, other qnrs incomplete due to time

## 2023-09-13 NOTE — Clinical Note
Dr. Ingram,  Thank you for your consult and care of the patient.  Patient is generally declining medication management but will consider ADHD medication atomoxetine.  Sincerely, Bruce Mueller M.D. Consultative Psychiatrist Program Medical Director, Lead Collaborative Care Psychiatry Service
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
## 2023-09-15 DIAGNOSIS — B96.89 BACTERIAL VAGINOSIS: ICD-10-CM

## 2023-09-15 DIAGNOSIS — N76.0 BACTERIAL VAGINOSIS: ICD-10-CM

## 2023-09-15 RX ORDER — METRONIDAZOLE 500 MG/1
500 TABLET ORAL 2 TIMES DAILY
Qty: 14 TABLET | Refills: 0 | Status: SHIPPED | OUTPATIENT
Start: 2023-09-15 | End: 2023-11-15

## 2023-09-15 NOTE — TELEPHONE ENCOUNTER
Medication Question or Refill    Contacts         Type Contact Phone/Fax    09/15/2023 10:42 AM CDT Phone (Incoming) Tamica Quiñonezdov NARAYANAN (Self) [phone] (M)            What medication are you calling about (include dose and sig)?: metroNIDAZOLE (FLAGYL) 500 MG tablet ()    Preferred Pharmacy:    [id number] [address] [address]  Phone: [phone] Fax: [phone]      Controlled Substance Agreement on file:   CSA -- Patient Level:    CSA: None found at the patient level.       Who prescribed the medication?: [place]    Do you need a refill? Yes    When did you use the medication last? N/A    Patient offered an appointment? No    Do you have any questions or concerns?  Yes: Patient picked up the medication listed above, but she losted at home and can't find it anywhere.      Could we send this information to you in [facility] or would you prefer to receive a phone call?:   Patient would prefer a phone call   Okay to leave a detailed message?: Yes at Home number on file [phone] (home)

## 2023-09-20 PROBLEM — F33.2 MAJOR DEPRESSIVE DISORDER, RECURRENT EPISODE, SEVERE WITH MIXED FEATURES (H): Status: ACTIVE | Noted: 2023-09-20

## 2023-09-22 NOTE — TELEPHONE ENCOUNTER
Pt called in regarding her medication for metroNIDAZOLE (FLAGYL) 500 MG tablets. She stated that she lost the medication and went to pharmacy and bought over the counter pills and stated that it did not help and that is has gotten worse. Pt is requesting a new prescription to get sent to her pharmacy along with the metroNIDAZOLE gel. Please advise. Pt would like a call back as well to let her know it was sent.  Thanks!  Routing to DOD

## 2023-09-26 ENCOUNTER — TELEPHONE (OUTPATIENT)
Dept: PSYCHOLOGY | Facility: CLINIC | Age: 38
End: 2023-09-26
Payer: COMMERCIAL

## 2023-09-26 NOTE — TELEPHONE ENCOUNTER
Provider left  for pt requesting for pt to join today's telehealth visit or to call scheduling line to reschedule her visit.

## 2023-10-18 ENCOUNTER — VIRTUAL VISIT (OUTPATIENT)
Dept: PSYCHIATRY | Facility: CLINIC | Age: 38
End: 2023-10-18
Payer: COMMERCIAL

## 2023-10-18 DIAGNOSIS — F31.81 BIPOLAR 2 DISORDER (H): Primary | ICD-10-CM

## 2023-10-18 PROCEDURE — 99207 PR NO BILLABLE SERVICE THIS VISIT: CPT | Mod: VID | Performed by: PSYCHIATRY & NEUROLOGY

## 2023-10-18 NOTE — NURSING NOTE
Is the patient currently in the state of [place]? YES    Visit mode:VIDEO    If the visit is dropped, the patient can be reconnected by: VIDEO VISIT: Text to cell phone:   Telephone Information:   Mobile [phone]       Will anyone else be joining the visit? NO  (If patient encounters technical issues they should call [phone]    How would you like to obtain your AVS? MyChart    Are changes needed to the allergy or medication list? Yes Pt stated they are not taking any medication and cannot afford them    Reason for visit: STEPHANIE VILLEGASF

## 2023-10-18 NOTE — PROGRESS NOTES
Provider had overbooked schedule and did not see patient. Did call patient x 2 and send links to connect. No response. No evidence of acute psychiatric issues. This will NOT count as a no show/failed appointment.  Submitting no charge COVID.

## 2023-11-09 ENCOUNTER — FCC EXTENDED DOCUMENTATION (OUTPATIENT)
Dept: PSYCHOLOGY | Facility: CLINIC | Age: 38
End: 2023-11-09
Payer: COMMERCIAL

## 2023-11-09 NOTE — PROGRESS NOTES
Discharge Summary  Multiple Sessions    Client Name: Armida Quiñonez MRN#: [id number] YOB: 1985      Intake / Discharge Date: 8/8/23 / 9/29/23      DSM5 Diagnoses: (Sustained by DSM5 Criteria Listed Above)  Diagnoses: 296.33 (F33.2) Major Depressive Disorder, Recurrent Episode, Severe With mixed features  309.81 (F43.10) Posttraumatic Stress Disorder (includes Posttraumatic Stress Disorder for Children 6 Years and Younger)  With dissociative symptoms  Psychosocial & Contextual Factors: lives in own home with two children, housing is currently stable, hx of trauma, hx of SI  .           Presenting Concern:  Concern about anxiety, depression, and PTSD symptoms.      Reason for Discharge:  Client did not return      Disposition at Time of Last Encounter:   Comments:   DA complete. Pt could benefit from ambulatory resources.     Risk Management:   Client has had a history of suicidal ideation: pt reported a hx of SI and denied current SI.  A safety and risk management plan has been developed including: Patient consented to co-developed safety plan on 9/8/23.  Safety and risk management plan was reviewed.   Patient agreed to use safety plan should any safety concerns arise.  A copy was made available to the patient.    Safety Plan:  Adult Short Safety Plan:   Name: Armida Quiñonez  YOB: 1985  Date: September 7, 2023   My primary care provider: Fatemeh silva  My primary care clinic:  [facility]   My prescriber: Maribell Ingram  Other care team support:  Moriah Adler Ali    My Triggers:  Relationship conflict in intimate relationships, Work  difficulties : stress from workload, School concerns : stress from workload, Financial concerns : trouble paying bills, Housing concerns : trouble paying rent, and Medical Health : coping with medical concerns       Additional People, Places, and Things that I can access for support: therapist, therapy sessions, and  watching tik-tok             What is important to me and makes life worth living: kids, motivation for future .            GREEN     Good Control  1. I feel good  2. No suicidal thoughts   3. Can work, sleep and play   4. Little to no struggle to get out of bed in the morning       Action Steps  1. Self-care: balanced meals, exercising, sleep practices, etc.  2. Take your medications as prescribed.  3. Continue meetings with therapist and prescriber.  4.  Do the healthy things that I enjoy.                YELLOW  Getting Worse  I have ANY of these:  1. I do not feel good  2. Difficulty Concentrating  3. Sleep is changing  4. Increase/Change in my thoughts to hurt self and/or others, but I can still manage and not act on it.   5. Not taking care of self.  6. Struggle greatly to get out of bed in the morning or not able to make it to obligations             Action Steps (in addition to the above):  1. Inform your therapist and psychiatric prescriber/PCP.  2. Keep taking your medications as prescribed.    3. Turn to people you can ask for help.  4. Use internal coping strategies -see below.  5. Create safe environment: notify friends/family of increase in symptoms  6. Spend time with  animals.             RED  Get Help  If I have ANY of these:  1. Current and uncontrollable thoughts and/or behaviors to hurt self and/or others.        Actions to manage my safety  1. Contact your emergency person Naida Fermin  2. Call or Text [phone]  3. Call my crisis team- [facility] [phone] [facility] Crisis [facility]  3. Or Call 911   4. Or go to the emergency room at [facility] right away         My Internal Coping Strategies include the following:  play with my pet and watch tiktok    Referred To:  N/a        Mavis Staples   11/9/2023

## 2023-11-15 ENCOUNTER — OFFICE VISIT (OUTPATIENT)
Dept: FAMILY MEDICINE | Facility: CLINIC | Age: 38
End: 2023-11-15
Payer: COMMERCIAL

## 2023-11-15 ENCOUNTER — NURSE TRIAGE (OUTPATIENT)
Dept: FAMILY MEDICINE | Facility: CLINIC | Age: 38
End: 2023-11-15

## 2023-11-15 VITALS
HEIGHT: 63 IN | HEART RATE: 83 BPM | DIASTOLIC BLOOD PRESSURE: 74 MMHG | WEIGHT: 268 LBS | OXYGEN SATURATION: 100 % | BODY MASS INDEX: 47.48 KG/M2 | SYSTOLIC BLOOD PRESSURE: 118 MMHG | TEMPERATURE: 98 F | RESPIRATION RATE: 18 BRPM

## 2023-11-15 DIAGNOSIS — N92.0 EXCESSIVE OR FREQUENT MENSTRUATION: Primary | ICD-10-CM

## 2023-11-15 PROCEDURE — 99213 OFFICE O/P EST LOW 20 MIN: CPT | Performed by: FAMILY MEDICINE

## 2023-11-15 RX ORDER — NORGESTIMATE AND ETHINYL ESTRADIOL 0.25-0.035
1 KIT ORAL DAILY
Qty: 84 TABLET | Refills: 0 | Status: SHIPPED | OUTPATIENT
Start: 2023-11-15

## 2023-11-15 ASSESSMENT — ASTHMA QUESTIONNAIRES: ACT_TOTALSCORE: 22

## 2023-11-15 ASSESSMENT — PATIENT HEALTH QUESTIONNAIRE - PHQ9
SUM OF ALL RESPONSES TO PHQ QUESTIONS 1-9: 6
SUM OF ALL RESPONSES TO PHQ QUESTIONS 1-9: 6
10. IF YOU CHECKED OFF ANY PROBLEMS, HOW DIFFICULT HAVE THESE PROBLEMS MADE IT FOR YOU TO DO YOUR WORK, TAKE CARE OF THINGS AT HOME, OR GET ALONG WITH OTHER PEOPLE: NOT DIFFICULT AT ALL

## 2023-11-15 NOTE — PROGRESS NOTES
"  Assessment & Plan     Excessive or frequent menstruation  We discussed that this is likely a effect of Nexplanon.  As below, contraceptive options discussed.  She may want to proceed with simply OCPs.  We discussed that OCPs could be instituted now to give her an estrogen dose that would help stabilize endometrium and decreased bleeding for a short time.  Patient discussed returning to clinic soon.  We discussed VTE risks of estrogen.    - norgestimate-ethinyl estradiol (ORTHO-CYCLEN) 0.25-35 MG-MCG tablet  Dispense: 84 tablet; Refill: 0     BMI:   Estimated body mass index is 47.1 kg/m  as calculated from the following:    Height as of this encounter: 1.607 m (5' 3.25\").    Weight as of this encounter: 121.6 kg (268 lb).     Kalin Engel MD  [facility]    Mandi Huffman is a 38 year old, presenting for the following health issues:  Abnormal Bleeding Problem (Peanut size blood clots on and off. On current cycle since 9/28/23)      11/15/2023     1:56 PM   Additional Questions   Roomed by aKthleen   Accompanied by self       Abnormal Bleeding Problem    History of Present Illness       Reason for visit:  Still on period or 3weeks  Symptom onset:  3-4 weeks ago  Symptom intensity:  Moderate  Symptom progression:  Staying the same  Had these symptoms before:  No  What makes it worse:  No  What makes it better:  No    She eats 2-3 servings of fruits and vegetables daily.She consumes 2 sweetened beverage(s) daily.She exercises with enough effort to increase her heart rate 9 or less minutes per day.  She exercises with enough effort to increase her heart rate 3 or less days per week. She is missing 7 dose(s) of medications per week.     Patient is a 38-year-old female who has had Nexplanon in for approximately 2 years.  She is noticing that she is having significant bleeding.  She has not had continuous blood passage for many days.  Not particularly heavy but she notes small clots most " "days.  She is distressed by this.  Does not feel like her volume of blood loss is high but does want to end the bleeding if possible.    Not dizzy.  Not short of breath.  Not extremely fatigued.    We discussed different contraceptive options.  Tubal ligation was discussed.  She is not sure she wants to go through with that and is not sure she wants to infertility indefinitely.  IUD discussed.  She has had those before and they were effective.  Previous partner did report feeling the IUD during intercourse.    Patient rarely uses tobacco.  More frequent smoker of THC.  She has not had a personal or family history of venous thromboembolism.    Review of Systems   Genitourinary:  Positive for menstrual problem.          Objective    /74 (BP Location: Left arm, Patient Position: Sitting, Cuff Size: Adult Large)   Pulse 83   Temp 98  F (36.7  C) (Temporal)   Resp 18   Ht 1.607 m (5' 3.25\")   Wt 121.6 kg (268 lb)   LMP  (LMP Unknown)   SpO2 100%   BMI 47.10 kg/m    Body mass index is 47.1 kg/m .  Physical Exam   GENERAL: alert, not distressed  CHEST: clear, no rales, rhonchi, or wheezes  CARDIAC: regular without murmur, gallop, or rub      Prior to immunization administration, verified patients identity using patient s name and date of birth. Please see Immunization Activity for additional information.     Screening Questionnaire for Adult Immunization    Are you sick today?   No   Do you have allergies to medications, food, a vaccine component or latex?   Yes   Have you ever had a serious reaction after receiving a vaccination?   YES   Do you have a long-term health problem with heart, lung, kidney, or metabolic disease (e.g., diabetes), asthma, a blood disorder, no spleen, complement component deficiency, a cochlear implant, or a spinal fluid leak?  Are you on long-term aspirin therapy?   Yes   Do you have cancer, leukemia, HIV/AIDS, or any other immune system problem?   No   Do you have a parent, " brother, or sister with an immune system problem?   Yes   In the past 3 months, have you taken medications that affect  your immune system, such as prednisone, other steroids, or anticancer drugs; drugs for the treatment of rheumatoid arthritis, Crohn s disease, or psoriasis; or have you had radiation treatments?   No   Have you had a seizure, or a brain or other nervous system problem?   No   During the past year, have you received a transfusion of blood or blood    products, or been given immune (gamma) globulin or antiviral drug?   No   For women: Are you pregnant or is there a chance you could become       pregnant during the next month?   No   Have you received any vaccinations in the past 4 weeks?   No     Immunization questionnaire was positive for at least one answer.  Notified Dr Soriano.      Patient instructed to remain in clinic for 15 minutes afterwards, and to report any adverse reactions.     Screening performed by Kathleen Buck MA on 11/15/2023 at 2:02 PM.

## 2023-11-15 NOTE — COMMUNITY RESOURCES LIST (ENGLISH)
11/15/2023   [facility]  N/A  For questions about this resource list or additional care needs, please contact your primary care clinic or care manager.  Phone: [phone]   Email: N/A   Address: [address] [place] [address]   Hours: N/A        Financial Stability       Rent and mortgage payment assistance  1  [facility] - Outpatient Addiction Treatment Distance: 1.17 miles      In-Person, Phone/Virtual   [address] [facility] [address] [place] [address]  Language: English, Belarusian  Hours: Mon - Fri 8:00 AM - 4:30 PM  Fees: Insurance   Phone: (823) 919-6162 Email: roots@[employer] Website: https://[employer]/roots/     2  [facility] Finance Agency - Multifamily Rental Assistance Program Distance: 1.64 miles      Phone/Virtual   [address] [facility] [address] [place] [address]  Language: English  Hours: Mon - Fri 8:00 AM - 5:00 PM Appt. Only  Fees: Free   Phone: (616) 564-4346 Email: m[place].WorldMate@[facility]. Website: https://www.[facility] Ridge [facility] CenterLeosphere.gov/index.html          Food and Nutrition       Food pantry  3  [facility] Distance: 0.51 miles      Delivery, [place] [address]  Language: English, Belarusian  Hours: Mon 9:00 AM - 6:00 PM Appt. Only, Tue - Fri 9:00 AM - 5:00 PM Appt. Only  Fees: Free   Phone: (312) 130-6303 Email: info@[employer]org Website: http://www.[employer]org/site     4  [employer] [facility] [place] - [employer] - Food pantry Distance: 1.4 miles      Delivery, [facility]   [address] [place] [address]  Language: English  Hours: Mon 1:00 PM - 6:00 PM , Tue 9:30 AM - 2:30 PM , Wed - Thu 9:30 AM - 2:00 PM  Fees: Free   Phone: (249) 740-4332 Email: info@[employer].org Website: http://interfaithaction.org/     SNAP application assistance  5  [employer] [place] Distance: 1.11 miles      Phone/Virtual   [address] [address]   Language: English, Hmong, Beninese, Yemeni, Tristanian  Hours: Mon - Fri 8:30 AM - 4:30 PM  Fees: Free   Phone: (703) 912-6035 Email: helpline@[facility].org Website: https://www.hungersoWallflowerions.org/programs/[place]-food-helpline/     6  [facility] ([facility] Distance: 1.32 miles      Phone/Virtual   [address] [facility] [address] [place] [address]  Language: English  Hours: Mon - Fri 8:00 AM - 4:30 PM  Fees: Free   Phone: (858) 315-4274 Email: info@capr.org Website: http://www.caprw.org/     Soup kitchen or free meals  7  [facility] [place] [facility] - Free Summer Meals Distance: 1.06 miles      In-Person   [address] [place] [address]  Language: English, Hmong, Tristanian  Hours: Mon - Fri 12:00 PM - 1:00 PM , Mon - Fri 3:00 PM - 4:00 PM  Fees: Free   Phone: (322) 188-3414 Email: Mikayla@[facility]. Website: https://www.[facility]/departments/biggs-recreation/[place]     8  [facility] [place] [facility] Distance: 1.88 miles      Pick   [address] [place] [address]  Language: English  Hours: Tue 2:00 PM - 4:00 PM , Thu 2:00 PM - 4:00 PM  Fees: Free   Phone: (375) 977-9809 Email: ramón@[facility]. Website: https://www.[facility]/facilities/dqxyek-ieadmjtge-toyvgt          Important Numbers & Websites       Emergency Services   911  City Services   311  Poison Control   (227) 676-5228  Suicide Prevention Lifeline   (224) 184-1453 (TALK)  Child Abuse Hotline   (523) 828-2749 (4-A-Child)  Sexual Assault Hotline   (343) 447-8416 (HOPE)  National Runaway Safeline   (255) 843-7676 (RUNAWAY)  All-Options Talkline   (892) 108-7041  Substance Abuse Referral   (924) 194-1871 (HELP)

## 2023-11-15 NOTE — ACP (ADVANCE CARE PLANNING)
Have you ever done Advance Care Planning? (For example, a Health Directive, POLST, or a discussion with a medical provider or your loved ones about your wishes): No, advance care planning information given to patient to review.  Patient plans to discuss their wishes with loved ones or provider.

## 2023-11-15 NOTE — COMMUNITY RESOURCES LIST (ENGLISH)
11/15/2023   [facility] - Outpatient Clinics  N/A  For additional resource needs, please contact your health insurance member services or your primary care team.  Phone: [phone]   Email: N/A   Address: [address] [place] [address]   Hours: N/A        Financial Stability       Rent and mortgage payment assistance  1  [facility] - [facility] Distance: 1.17 miles      In-Person, Phone/Virtual   [address] [facility] [address] [place] [address]  Language: English, Kazakh  Hours: Mon - Fri 8:00 AM - 4:30 PM  Fees: Insurance   Phone: (284) 727-4984 Email: gris@[employer] Website: https://[employer]/roots/     2  [facility] Finance Agency - Multifamily Rental Assistance Program Distance: 1.64 miles      Phone/Virtual   [address] [facility] [address] [place] [address]  Language: English  Hours: Mon - Fri 8:00 AM - 5:00 PM Appt. Only  Fees: Free   Phone: (451) 990-8663 Email: mn.Aristos Logic@[facility]. Website: https://www.[facility] SystemTeez.by.gov/index.html          Food and Nutrition       Food pantry  3  [facility] Distance: 0.51 miles      Delivery, [place] [address]  Language: English, Kazakh  Hours: Mon 9:00 AM - 6:00 PM Appt. Only, Tue - Fri 9:00 AM - 5:00 PM Appt. Only  Fees: Free   Phone: (635) 273-5207 Email: info@[employer].org Website: http://www.[employer].org/site     4  [employer] [facility] [place] - Department of [employer] Work - Food pantry Distance: 1.4 miles      Delivery, [facility]   [address] [facility] [address] [place] [address]  Language: English  Hours: Mon 1:00 PM - 6:00 PM , Tue 9:30 AM - 2:30 PM , Wed - Thu 9:30 AM - 2:00 PM  Fees: Free   Phone: (647) 480-3921 Email: info@[employer].org Website: http://interfaithaction.org/     SNAP application assistance  5  [employer] [place] Distance: 1.11 miles      Phone/Virtual   [address] [address]  Language:  English, Hmong, Indonesian, Kuwaiti, Syrian  Hours: Mon - Fri 8:30 AM - 4:30 PM  Fees: Free   Phone: (406) 592-6348 Email: helpline@[employer].org Website: https://www.hungersoMobileCauseions.org/programs/[place]-food-helpline/     6  [facility] ([facility] Distance: 1.32 miles      Phone/Virtual   [address] [facility] [place] [address]  Language: English  Hours: Mon - Fri 8:00 AM - 4:30 PM  Fees: Free   Phone: (464) 936-5132 Email: info@capr.org Website: http://www.caprw.org/     Soup kitchen or free meals  7  [facility] [place] - [facility] - Free Summer Meals Distance: 1.06 miles      In-Person   [address] [facility] [place] [address]  Language: English, Hmong, Syrian  Hours: Mon - Fri 12:00 PM - 1:00 PM , Mon - Fri 3:00 PM - 4:00 PM  Fees: Free   Phone: (505) 388-8899 Email: Mikayla@[facility]. Website: https://www.[facility]/departments/biggs-recreation/[place]-On license [facility]     8  [facility] [place] [facility] Distance: 1.88 miles      Pick   [address] [place] [address]  Language: English  Hours: Tue 2:00 PM - 4:00 PM , Thu 2:00 PM - 4:00 PM  Fees: Free   Phone: (713) 387-1003 Email: ramón@[facility]. Website: https://www.[facility]/facilities/qyugxu-yxzrpebie-eozofs          Important Numbers & Websites       [facility]   211 211itedway.org  Poison Control   (564) 981-4049 [place]po[place]  Suicide and Crisis Lifeline   988 98lifeline.org  Childhelp National Child Abuse Hotline   729.633.9840 Childhelphotline.org  National Sexual Assault Hotline   (183) 339-4323 (HOPE) Rainn.org  National Runaway Safeline   (374) 448-1106 (RUNAWAY) [facility].org  Pregnancy & Postpartum Support [place]   Call/text 804-983-9837 Ppsupportmn.org  Substance Abuse National Helpline ([facility]   419-450-HELP (6069) Findtreatment.gov  Emergency Services   916

## 2023-11-15 NOTE — TELEPHONE ENCOUNTER
Call received from patient:  Has appt on 12/13/23 but wondering if should be seen sooner  2. Menstrual period for 3 weeks, bleeding started at usual time for her cycle, but continues  3. Passing blood clots   A. Size: larger than a peanut sized M&M  4. Abdominal cramps-mild  5. Changes menstrual pad/tampon four times per day, sometimes more  6. Nexplanon in place and missed appointment to remove it  7. Not on blood thinning medication  8. Recently started OTC magnesium  9. No recent GYN surgery  10. No history of uterine fibroids but thinks had two small cysts or fibroids found on uterus previously via ultrasound exam-no further follow up was needed  11. Not feeling lightheaded  12. Sometimes feels ovulation pain    Writer recommended appt ideally today and offered appt with Dr. Engel at 1340.  Will keep 12/13/23 appt incase follow up recommended; otherwise it can be cancelled.    Patient verbalized understanding and in agreement with plan.    Visit date, time and location confirmed with patient.    Encouraged patient to call triage back with any questions/concerns, new, worsening or changing symptoms.  Seek ER evaluation if start passing blood clots the size of golf balls or larger, or if needing to change maxipad, menstrual cup or super/super plus tampon less than 1 hour x 2.    Patient verbalized understanding and in agreement with plan.    VAZQUEZ FriedN, RN-[facility]          Reason for Disposition   Patient wants to be seen    Additional Information   Negative: SEVERE vaginal bleeding (e.g., continuous red blood from vagina, or large blood clots) and very weak (can't stand)   Negative: Passed out (i.e., fainted, collapsed and was not responding)   Negative: Difficult to awaken or acting confused (e.g., disoriented, slurred speech)   Negative: Shock suspected (e.g., cold/pale/clammy skin, too weak to stand, low BP, rapid pulse)   Negative: Sounds like a life-threatening  emergency to the triager   Negative: Pregnant 20 or more weeks (5 months or more)   Negative: Pregnant < 20 weeks (less than 5 months)   Negative: Postpartum (from 0 to 6 weeks after delivery)   Negative: Vaginal discharge is main symptom and bleeding is slight   Negative: SEVERE abdominal pain (e.g., excruciating)   Negative: SEVERE dizziness (e.g., unable to stand, requires support to walk, feels like passing out now)   Negative: SEVERE vaginal bleeding (e.g., soaking 2 pads or tampons per hour and present 2 or more hours; 1 menstrual cup every 2 hours)   Negative: Patient sounds very sick or weak to the triager   Negative: MODERATE vaginal bleeding (i.e., soaking pad or tampon per hour and present > 6 hours; 1 menstrual cup every 6 hours)   Negative: Constant abdominal pain lasting > 2 hours   Negative: Pale skin (pallor) of new-onset or worsening   Negative: Taking Coumadin (warfarin) or other strong blood thinner, or known bleeding disorder (e.g., thrombocytopenia)   Negative: Skin bruises or nosebleed and not caused by an injury    Protocols used: Vaginal Bleeding - Xxiytkcu-C-NO

## 2023-11-17 ENCOUNTER — NURSE TRIAGE (OUTPATIENT)
Dept: NURSING | Facility: CLINIC | Age: 38
End: 2023-11-17
Payer: COMMERCIAL

## 2023-11-17 NOTE — TELEPHONE ENCOUNTER
Nurse Triage SBAR    Situation: Vaginal bleeding    Background: Patient calling. Patient went into urgent care yesterday for her excessive menstruation. Bleeding started on Oct 28th. She was started on birth control yesterday. No pelvic exam or imaging.     Assessment: Vomiting today. Feeling very weak. Bleeding blood clots still. Feeling jittery. Abdominal pain is an 8/10. Dizziness.     Protocol Recommended Disposition: Emergency Department    Recommendation: According to the protocol, Patient should go to the ED now. Advised Patient that the patient needs to go to the ED now. Care advice given. Patient verbalizes understanding and agrees with plan of care. Reviewed concerning symptoms and when to call 911.    Shana Shields RN Nursing Advisor 11/17/2023 6:04 PM     Reason for Disposition   SEVERE abdominal pain    Additional Information   Negative: Shock suspected (e.g., cold/pale/clammy skin, too weak to stand, low BP, rapid pulse)   Negative: Difficult to awaken or acting confused (e.g., disoriented, slurred speech)   Negative: Passed out (i.e., lost consciousness, collapsed and was not responding)   Negative: Sounds like a life-threatening emergency to the triager   Negative: Followed a genital area injury (e.g., vagina, vulva)   Negative: Pregnant 20 or more weeks   Negative: Pregnant < 20 weeks  (less than 5 months)   Negative: Postpartum (from 0 to 6 weeks after delivery)   Negative: Bleeding occurring > 12 months after menopause   Negative: Bleeding from sexual abuse or rape   Negative: [1] Vaginal discharge is main symptom AND [2] small amount of blood    Protocols used: Vaginal Bleeding - [id number]

## 2024-01-10 ENCOUNTER — TRANSFERRED RECORDS (OUTPATIENT)
Dept: MULTI SPECIALTY CLINIC | Facility: CLINIC | Age: 39
End: 2024-01-10
Payer: COMMERCIAL

## 2024-01-10 LAB — RETINOPATHY: NORMAL

## 2024-01-19 ENCOUNTER — TELEPHONE (OUTPATIENT)
Dept: FAMILY MEDICINE | Facility: CLINIC | Age: 39
End: 2024-01-19

## 2024-01-19 PROBLEM — N97.9 FEMALE INFERTILITY: Status: ACTIVE | Noted: 2021-01-19

## 2024-01-19 NOTE — TELEPHONE ENCOUNTER
LMTCB #1. If pt calls back please help pt schedule appt listed below. Postponing until Monday. Thanks!

## 2024-01-19 NOTE — TELEPHONE ENCOUNTER
Reason for Call:  Appointment Request    Patient requesting this type of appt: Procedure: NEXPLANON REMOVAL    Requested provider:  Andra    Reason patient unable to be scheduled: Not within requested timeframe    When does patient want to be seen/preferred time: 1-2 weeks    Comments: Requesting a work in for January    Could we send this information to you in [employer] or would you prefer to receive a phone call?:   Patient would prefer a phone call   Okay to leave a detailed message?: Yes at Cell number on file:    Telephone Information:   Mobile [phone]       Call taken on 1/19/2024 at 7:45 AM by Patricia Wilson

## 2024-01-22 NOTE — TELEPHONE ENCOUNTER
Future Appointments 1/22/2024 - 7/20/2024        Date Visit Type Length Department Provider     2/20/2024 11:40 AM NEXPLANON REMOVAL OR REPLACE 20 min [facility] Sandie Silverman PA-C     2/20/2024 11:40 AM NEXPLANON REMOVAL OR REPLACE 40 min [facility]/OB [facility] PRO RM    Location Instructions:     [facility] is located at [address] [place] [address] in [place], at the intersection of [facility]. This is one block south of the [facility] [employer] [facility]. Free parking is available in the lot directly north of the clinic across [facility]. The clinic is near stops along bus routes 3 and [address].                   No further action, completing task.

## 2024-01-24 ENCOUNTER — OFFICE VISIT (OUTPATIENT)
Dept: FAMILY MEDICINE | Facility: CLINIC | Age: 39
End: 2024-01-24
Payer: COMMERCIAL

## 2024-01-24 ENCOUNTER — PATIENT OUTREACH (OUTPATIENT)
Dept: CARE COORDINATION | Facility: CLINIC | Age: 39
End: 2024-01-24

## 2024-01-24 VITALS
WEIGHT: 267 LBS | HEART RATE: 82 BPM | RESPIRATION RATE: 16 BRPM | DIASTOLIC BLOOD PRESSURE: 76 MMHG | SYSTOLIC BLOOD PRESSURE: 132 MMHG | OXYGEN SATURATION: 100 % | TEMPERATURE: 97.8 F | HEIGHT: 63 IN | BODY MASS INDEX: 47.31 KG/M2

## 2024-01-24 DIAGNOSIS — Z11.3 SCREEN FOR STD (SEXUALLY TRANSMITTED DISEASE): ICD-10-CM

## 2024-01-24 DIAGNOSIS — Z13.9 ENCOUNTER FOR SCREENING INVOLVING SOCIAL DETERMINANTS OF HEALTH (SDOH): ICD-10-CM

## 2024-01-24 DIAGNOSIS — Z30.46 NEXPLANON REMOVAL: Primary | ICD-10-CM

## 2024-01-24 DIAGNOSIS — Z87.42 HISTORY OF OVARIAN CYST: ICD-10-CM

## 2024-01-24 DIAGNOSIS — Z30.46 ENCOUNTER FOR NEXPLANON REMOVAL: ICD-10-CM

## 2024-01-24 DIAGNOSIS — N92.6 IRREGULAR MENSES: ICD-10-CM

## 2024-01-24 LAB
CLUE CELLS: ABNORMAL
HCV AB SERPL QL IA: NONREACTIVE
HIV 1+2 AB+HIV1 P24 AG SERPL QL IA: NONREACTIVE
T PALLIDUM AB SER QL: NONREACTIVE
TRICHOMONAS, WET PREP: ABNORMAL
WBC'S/HIGH POWER FIELD, WET PREP: ABNORMAL
YEAST, WET PREP: ABNORMAL

## 2024-01-24 PROCEDURE — 87491 CHLMYD TRACH DNA AMP PROBE: CPT | Performed by: PHYSICIAN ASSISTANT

## 2024-01-24 PROCEDURE — 87591 N.GONORRHOEAE DNA AMP PROB: CPT | Performed by: PHYSICIAN ASSISTANT

## 2024-01-24 PROCEDURE — 86780 TREPONEMA PALLIDUM: CPT | Performed by: PHYSICIAN ASSISTANT

## 2024-01-24 PROCEDURE — 87389 HIV-1 AG W/HIV-1&-2 AB AG IA: CPT | Performed by: PHYSICIAN ASSISTANT

## 2024-01-24 PROCEDURE — 87210 SMEAR WET MOUNT SALINE/INK: CPT | Performed by: PHYSICIAN ASSISTANT

## 2024-01-24 PROCEDURE — 99212 OFFICE O/P EST SF 10 MIN: CPT | Mod: 25 | Performed by: PHYSICIAN ASSISTANT

## 2024-01-24 PROCEDURE — 36415 COLL VENOUS BLD VENIPUNCTURE: CPT | Performed by: PHYSICIAN ASSISTANT

## 2024-01-24 PROCEDURE — 11982 REMOVE DRUG IMPLANT DEVICE: CPT | Performed by: PHYSICIAN ASSISTANT

## 2024-01-24 PROCEDURE — 86803 HEPATITIS C AB TEST: CPT | Performed by: PHYSICIAN ASSISTANT

## 2024-01-24 RX ORDER — LIDOCAINE HYDROCHLORIDE AND EPINEPHRINE 10; 10 MG/ML; UG/ML
2.5 INJECTION, SOLUTION INFILTRATION; PERINEURAL ONCE
Status: ACTIVE | OUTPATIENT
Start: 2024-01-24

## 2024-01-24 ASSESSMENT — PATIENT HEALTH QUESTIONNAIRE - PHQ9
SUM OF ALL RESPONSES TO PHQ QUESTIONS 1-9: 4
SUM OF ALL RESPONSES TO PHQ QUESTIONS 1-9: 4
10. IF YOU CHECKED OFF ANY PROBLEMS, HOW DIFFICULT HAVE THESE PROBLEMS MADE IT FOR YOU TO DO YOUR WORK, TAKE CARE OF THINGS AT HOME, OR GET ALONG WITH OTHER PEOPLE: SOMEWHAT DIFFICULT

## 2024-01-24 NOTE — PROGRESS NOTES
1/24/2024  Clinic Care Coordination Contact  [facility]/Voicemail    Clinical Data: Care Coordinator Outreach    Outreach Documentation Number of Outreach Attempt   1/24/2024   3:48 PM 1   1/24/2024   3:49 PM 1       Referral SDOH Food and Housing     Clinical Data: Care Coordinator Outreach: Discuss CCC enrollment   Outreach attempted x 1.  Left message on patient's voicemail with call back information and requested return call.    Plan: Care Coordinator will try to reach patient again in 1-3 business days.    CHW Outreach: 2nd attempt 1-25-24    Usman Lorenzo  Community Health Worker  [facility] Care Coordination  [email or web address]   Office: [phone]  Fax: [phone]
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
## 2024-01-24 NOTE — COMMUNITY RESOURCES LIST (ENGLISH)
01/24/2024   [facility] [employer]  N/A  For questions about this resource list or additional care needs, please contact your primary care clinic or care manager.  Phone: [phone]   Email: N/A   Address: [address] [place] [address]   Hours: N/A        Financial Stability       Rent and mortgage payment assistance  1  [facility] Distance: 1.17 miles      In-Person, Phone/Virtual   [address] [facility] [address] [place] [address]  Language: English, Turkish  Hours: Mon - Fri 8:00 AM - 4:30 PM  Fees: Insurance   Phone: (251) 547-7097 Email: roots@[employer] Website: https://[employer]/roots/     2  [facility] [employer] - Multifamily Rental Assistance Program Distance: 1.64 miles      Phone/Virtual   [address] [facility] [address] [place] [address]  Language: English  Hours: Mon - Fri 8:00 AM - 5:00 PM Appt. Only  Fees: Free   Phone: (326) 590-5801 Email: [place].[employer]@[facility]. Website: https://www.[facility].gov/index.html          Food and Nutrition       Food pantry  3  [facility] Distance: 0.51 miles      Delivery, [place] [address]  Language: English, Turkish  Hours: Mon 9:00 AM - 6:00 PM Appt. Only, Tue - Fri 9:00 AM - 5:00 PM Appt. Only  Fees: Free   Phone: (569) 554-8792 Email: info@[employer].org Website: http://www.[employer].org/site     4  [employer] [facility] [place] [facility] [employer] - Food pantry Distance: 1.4 miles      Delivery, [facility]   [address] [facility] [address] [place] [address]  Language: English  Hours: Mon 1:00 PM - 6:00 PM , Tue 9:30 AM - 2:30 PM , Wed - Thu 9:30 AM - 2:00 PM  Fees: Free   Phone: (641) 149-4676 Email: info@[employer].org Website: http://interfaithaction.org/     SNAP application assistance  5  [employer] [place] Distance: 1.11 miles      Phone/Virtual   [address] [address]   Language: English, Hmong, Cayman Islander, Afghan, Venezuelan  Hours: Mon - Fri 8:30 AM - 4:30 PM  Fees: Free   Phone: (227) 505-6610 Email: helpline@[employer].org Website: https://www.hungersoGet[employer]Bids.org/programs/[place]-food-helpline/     6  [employer] ([facility] Distance: 1.32 miles      Phone/Virtual   [address] [facility] [address] [place] [address]  Language: English  Hours: Mon - Fri 8:00 AM - 4:30 PM  Fees: Free   Phone: (319) 305-8180 Email: info@capr.org Website: http://www.caprw.org/     Soup kitchen or free meals  7  [employer] [place] [facility] - Free Summer Meals Distance: 1.06 miles      In-Person   [address] [facility] [place] [address]  Language: English  Hours: Mon - Fri 12:00 PM - 1:00 PM , Mon - Fri 3:00 PM - 4:00 PM  Fees: Free   Phone: (539) 609-6928 Email: himanshu@[facility]. Website: https://www.[facility]/departments/biggs-recreation/[facility]     8  [facility] [employer] [place] [facility] Distance: 1.88 miles      Pick   [address] [place] [address]  Language: English  Hours: Tue 2:00 PM - 4:00 PM , Thu 2:00 PM - 4:00 PM  Fees: Free   Phone: (506) 937-3515 Email: himanshu@[facility]. Website: https://www.[facility]/facilities/hxezha-ehxsxyluv-ssosjw          Important Numbers & Websites       Emergency Services   911  City Services   311  Poison Control   (686) 546-7816  Suicide Prevention Lifeline   (919) 317-9022 (TALK)  Child Abuse Hotline   (138) 911-7516 (4-A-Child)  Sexual Assault Hotline   (555) 744-6918 (HOPE)  National Runaway Safeline   (678) 887-3502 (RUNAWAY)  All-Options Talkline   (470) 668-3661  Substance Abuse Referral   (871) 770-2689 (HELP)
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
## 2024-01-25 LAB
C TRACH DNA SPEC QL PROBE+SIG AMP: NEGATIVE
N GONORRHOEA DNA SPEC QL NAA+PROBE: NEGATIVE
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
## 2024-01-26 ENCOUNTER — PATIENT OUTREACH (OUTPATIENT)
Dept: CARE COORDINATION | Facility: CLINIC | Age: 39
End: 2024-01-26

## 2024-01-26 ENCOUNTER — PATIENT OUTREACH (OUTPATIENT)
Dept: NURSING | Facility: CLINIC | Age: 39
End: 2024-01-26
Payer: COMMERCIAL

## 2024-01-26 PROBLEM — Z30.017 NEXPLANON INSERTION: Status: RESOLVED | Noted: 2021-06-17 | Resolved: 2024-01-26

## 2024-01-26 NOTE — PROGRESS NOTES
Spoke with pt about food resource networks [place] [id number]. I also gave pt information abt [employer] and Basic Needs address and Saturday hours for pt. Pt has transportation and is very interested in going to get additional food supply for her household this weekend. She stated her Snaps is going into review with [facility] and wants to make sure her Snaps does not close. Provided additional support with making sure her paperwork was readily available and that she does not miss her interview appt to avoid hold. Pt states she will call [facility] on Monday 01/29/2024.     I enrolled pt with [employer] (spoke with her she can also use her Snap benefit with [employer] and [employer] All) along with monthly voucher, sending Food Resouce packet along with $40 Cub card. Will followup in 1 month.     Fransisco Mccollum  Community Advancement Food Resource Navigator  [employer] [facility] [employer]   Cell: [phone]

## 2024-01-26 NOTE — LETTER
[facility] CARE COORDINATION  No address on file    January 26, 2024    Armida Quiñonez  [address]  [place] [address]      Dear Armida,    I am a clinic care coordinator who works with Physician Sonja Nichole-Primary with the [facility]. I wanted to introduce myself and provide you with my contact information for you to be able to call me with any questions or concerns. Below is a description of clinic care coordination and how I can further assist you.       The clinic care coordination team is made up of a registered nurse, , financial resource worker and community health worker who understand the health care system. The goal of clinic care coordination is to help you manage your health and improve access to the health care system. Our team works alongside your provider to assist you in determining your health and social needs. We can help you obtain health care and community resources, providing you with necessary information and education. We can work with you through any barriers and develop a care plan that helps coordinate and strengthen the communication between you and your care team.  Our services are voluntary and are offered without charge to you personally.    Please feel free to contact me with any questions or concerns regarding care coordination and what we can offer.      We are focused on providing you with the highest-quality healthcare experience possible.    Sincerely,     Tracie Barnes RAYMOND   Social Work Care Coordinator   [facility]  [phone]

## 2024-01-26 NOTE — PROGRESS NOTES
Clinic Care Coordination Contact  [facility]/Voicemail    Clinical Data: Care Coordinator Outreach    Outreach Documentation Number of Outreach Attempt   1/24/2024   3:48 PM 1   1/24/2024   3:49 PM 1   1/26/2024   9:05 AM 1       Left message on patient's voicemail with call back information and requested return call.    Plan: Care Coordinator will send care coordination introduction letter with care coordinator contact information and explanation of care coordination services via [employer]. Care Coordinator will try to reach patient again in 3-5 business days.    OMID Polanco   Social Work Care Coordinator   [facility]  [phone]

## 2024-01-29 ENCOUNTER — PATIENT OUTREACH (OUTPATIENT)
Dept: CARE COORDINATION | Facility: CLINIC | Age: 39
End: 2024-01-29
Payer: COMMERCIAL

## 2024-01-29 ASSESSMENT — ACTIVITIES OF DAILY LIVING (ADL): DEPENDENT_IADLS:: INDEPENDENT

## 2024-01-29 NOTE — LETTER
[facility] CARE COORDINATION  No address on file    January 29, 2024    Armida Quiñonez  [address]  [place] [address]      Dear Armida,    I am a clinic care coordinator who works with Physician Sonja Ref-Primary with the [facility]. I wanted to thank you for spending the time to talk with me.  Below is a description of clinic care coordination and how I can further assist you.       The clinic care coordination team is made up of a registered nurse, , financial resource worker and community health worker who understand the health care system. The goal of clinic care coordination is to help you manage your health and improve access to the health care system. Our team works alongside your provider to assist you in determining your health and social needs. We can help you obtain health care and community resources, providing you with necessary information and education. We can work with you through any barriers and develop a care plan that helps coordinate and strengthen the communication between you and your care team.  Our services are voluntary and are offered without charge to you personally.    Please feel free to contact me with any questions or concerns regarding care coordination and what we can offer.      We are focused on providing you with the highest-quality healthcare experience possible.    Sincerely,     Tracie Barnes, [facility]   Social Work Care Coordinator   [facility]  [phone]

## 2024-01-29 NOTE — PROGRESS NOTES
1/29/2024  Clinic Care Coordination Contact  Care Team Conversations     Patient enrolled in [facility] as of  1-29-24  Reviewed assessment, goal     CC SW will reach out and connect with pt's MH  regarding housing concerns     CHW Follow up: Monthly  CHW Plan: Follow up on goal  CHW Next Follow Up: 2-29-24    Usman Lorenzo  Community Health Worker  [facility]  Clinic Care Coordination  [email or web address]   Office: [phone]  Fax: [phone]
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
## 2024-01-29 NOTE — PROGRESS NOTES
Clinic Care Coordination Contact  Clinic Care Coordination Contact  OUTREACH    Referral Information:  Referral Source: PCP    CC PIA called and connected with pt this afternoon. SW introduced herself, explained why she was calling, and discussed immediate needs. Pt notified CC PIA that she was contacted last week by someone offering to assist her with food resources already (Food Navigator reached out) and now her biggest concern was safety of housing. Pt reports that she is working with someone from long term housing assistance and that she has been assigned 3 different workers due to changes in staffing. Pt states that she has been trying to connect with the housing worker but that the are telling pt that she is missing information or is behind on various items, but pt gets help from her   with completing forms and does them with her. Pt reports her frustration with the lack of concern that her housing worker has and with the amount of drug use, violent crimes, and other unsafe events that have been happening in her apartment complex or outside has her very worried about her children. Pt passed SW her  name & number and was okay with SW reaching out to connect to get some information about the housing situation.       Chief Complaint   Patient presents with    Clinic Care Coordination - Initial        Universal Utilization: Yes  Clinic Utilization  Difficulty keeping appointments:: No  Compliance Concerns: No  No-Show Concerns: No  No PCP office visit in Past Year: No  Utilization      No Show Count (past year)  4             ED Visits  0             Hospital Admissions  0                    Current as of: 1/27/2024 11:22 AM                Clinical Concerns:  Current Medical Concerns:  Mild persistent asthma   Current Behavioral Concerns: DEMETRI    Education Provided to patient: Housing   Pain  Pain (GOAL):: No  Health Maintenance Reviewed: Due/Overdue     Overdue          OCT 22  2019  ASTHMA ACTION PLAN (Yearly)  Last completed: Oct 22, 2018     IESHA 8  2021 HEPATITIS B IMMUNIZATION (3 of 3 - 19+ 3-dose series)  Last completed: Jan 19, 2021 JAN 17 2023 YEARLY PREVENTIVE VISIT (Yearly)  Last completed: Jan 17, 2022     SEP 1  2023 INFLUENZA VACCINE (1)  Last completed: Oct 22, 2018     SEP 1  2023 COVID-19 Vaccine (3 - 2023-24 season)  Last completed: Dec 15, 2021     Clinical Pathway: None    Medication Management:  Medication review status: Not reviewed due to nature of conversation     Functional Status:  Dependent ADLs:: Independent  Dependent IADLs:: Independent  Bed or wheelchair confined:: No  Mobility Status: Independent  Fallen 2 or more times in the past year?: No  Any fall with injury in the past year?: No    Living Situation:  Current living arrangement:: I live in a private home with family (lives with 3 kids, temporarily custody of family member)  Type of residence:: Apartment    Lifestyle & Psychosocial Needs:    Social Determinants of Health     Food Insecurity: High Risk (1/24/2024)    Food Insecurity     Within the past 12 months, did you worry that your food would run out before you got money to buy more?: Yes     Within the past 12 months, did the food you bought just not last and you didn t have money to get more?: Yes   Depression: Not at risk (1/24/2024)    PHQ-2     PHQ-2 Score: 0   Housing Stability: High Risk (1/24/2024)    Housing Stability     Do you have housing? : Yes     Are you worried about losing your housing?: Yes   Tobacco Use: Medium Risk (1/24/2024)    Patient History     Smoking Tobacco Use: Former     Smokeless Tobacco Use: Never     Passive Exposure: Past   Financial Resource Strain: Low Risk  (1/24/2024)    Financial Resource Strain     Within the past 12 months, have you or your family members you live with been unable to get utilities (heat, electricity) when it was really needed?: No   Alcohol Use: Not on file   Transportation Needs: Low Risk   (1/24/2024)    Transportation Needs     Within the past 12 months, has lack of transportation kept you from medical appointments, getting your medicines, non-medical meetings or appointments, work, or from getting things that you need?: No   Physical Activity: Not on file   Interpersonal Safety: Low Risk  (11/15/2023)    Interpersonal Safety     Do you feel physically and emotionally safe where you currently live?: Yes     Within the past 12 months, have you been hit, slapped, kicked or otherwise physically hurt by someone?: No     Within the past 12 months, have you been humiliated or emotionally abused in other ways by your partner or ex-partner?: No   Stress: Not on file   Social Connections: Not on file     Diet:: Regular  Inadequate nutrition (GOAL):: No  Tube Feeding: No  Inadequate activity/exercise (GOAL):: No  Significant changes in sleep pattern (GOAL): No  Transportation means:: Regular car     Holiness or spiritual beliefs that impact treatment:: No  Mental health DX:: Yes  Mental health DX how managed:: Mental Health Targeted Care Manager  Mental health management concern (GOAL):: No  Chemical Dependency Status: No Current Concerns  Informal Support system:: Ritu based, None          Resources and Interventions:  Current Resources:      Community Resources: [facility] (long-term housing worker,  /[facility])  Supplies Currently Used at Home: None  Equipment Currently Used at Home: none            Advance Care Plan/Directive  Advanced Care Plans/Directives on file:: No    Referrals Placed: None       Care Plan:  Care Plan: Housing Safety       Problem: SDOH LACK OF STABLE HOUSING       Goal: Establish Stable Housing       Start Date: 1/29/2024    This Visit's Progress: 10%    Priority: Medium    Note:     Barriers: Social Support  Strengths:  case management support, comfortable navigating resources  Patient expressed understanding of goal: Yes  Action steps to achieve this  goal:  1. I will have CC SW follow up and connect with my MH  Parviz to discuss the concerns I have with my current housing and what we are working towards  2. I will continue to try and connect with my housing worker to see about being moved to a safer location for my family and I  3. I will connect with CCC team at least once a month per standard pt outreach and discuss any other needs that may arise during this time                               Patient/Caregiver understanding: Yes    Outreach Frequency: monthly, more frequently as needed  Future Appointments                In 1 month Moriah Adler MD [facility]            Plan: CC SW to reach out and connect with pt's MH  regarding housing concerns    OMID Polanco   Social Work Care Coordinator   [facility]  [phone]

## 2024-03-04 ENCOUNTER — PATIENT OUTREACH (OUTPATIENT)
Dept: CARE COORDINATION | Facility: CLINIC | Age: 39
End: 2024-03-04
Payer: COMMERCIAL

## 2024-03-04 NOTE — PROGRESS NOTES
3/4/2024  Clinic Care Coordination Contact  [facility]/Voicemail    Clinical Data: Care Coordinator Outreach    Outreach Documentation Number of Outreach Attempt   1/24/2024   3:48 PM 1   1/24/2024   3:49 PM 1   1/26/2024   9:05 AM 1   3/4/2024  11:30 AM 1     Phone went straight to .  Left message on patient's voicemail with call back information and requested return call.    Plan: Care Coordinator will try to reach patient again in 10 business days.    CHW follow up: 2nd attempt 3-18-24    Usman Lorenzo  Community Health Worker  [facility] Care Coordination  [email or web address]   Office: [phone]  Fax: [phone]

## 2024-03-12 ENCOUNTER — PATIENT OUTREACH (OUTPATIENT)
Dept: CARE COORDINATION | Facility: CLINIC | Age: 39
End: 2024-03-12
Payer: COMMERCIAL

## 2024-03-12 NOTE — PROGRESS NOTES
Care Coordination Clinician Chart Review    Situation: Patient chart reviewed by Care Coordinator.       Background: Care Coordination Program started: 1/24/2024. Initial assessment completed and patient-centered care plan(s) were developed with participation from patient. Lead CC handed patient off to CHW for continued outreaches.       Assessment: Per chart review, patient outreach completed by CC CHW on 3/4/2024.  Patient is actively working to accomplish goal(s). Patient's goal(s) appropriate and relevant at this time. Patient is not due for updated Plan of Care.  Assessments will be completed annually or as needed/with change of patient status.      Care Plan: Housing Safety       Problem: SDOH LACK OF STABLE HOUSING       Goal: Establish Stable Housing       Start Date: 1/29/2024    This Visit's Progress: 10%    Priority: Medium    Note:     Barriers: Social Support  Strengths:  case management support, comfortable navigating resources  Patient expressed understanding of goal: Yes  Action steps to achieve this goal:  1. I will have CC SW follow up and connect with my   Parviz to discuss the concerns I have with my current housing and what we are working towards  2. I will continue to try and connect with my housing worker to see about being moved to a safer location for my family and I  3. I will connect with CCC team at least once a month per standard pt outreach and discuss any other needs that may arise during this time                                  Plan/Recommendations: The patient will continue working with Care Coordination to achieve goal(s) as above. CHW will continue outreaches at minimum every 30 days and will involve Lead CC as needed or if patient is ready to move to Maintenance. Lead CC will continue to monitor CHW outreaches and patient's progress to goal(s) every 6 weeks.     Plan of Care updated and sent to patient: OMID Escalera   Social Work Care Coordinator   [facility]  [phone]

## 2024-03-19 ENCOUNTER — PATIENT OUTREACH (OUTPATIENT)
Dept: CARE COORDINATION | Facility: CLINIC | Age: 39
End: 2024-03-19
Payer: COMMERCIAL

## 2024-03-19 NOTE — PROGRESS NOTES
3/19/2024  Clinic Care Coordination Contact  [facility]/Voicemail    Clinical Data: Care Coordinator Outreach    Outreach Documentation Number of Outreach Attempt   3/4/2024  11:30 AM 1   3/19/2024   4:22 PM 2       Care Coordinator Outreach: follow up on goal(s)    Left message on patient's voicemail with call back information and requested return call.    Plan: Care Coordinator routed to CC SW to review chart before sending disenrollment letter with care coordinator contact information via mail.     CC SW to review chart if okay to change outreach frequency to 2 months due to waiting on housing   Care Coordinator will wait for CC SW to respond by 3-26-24    Usman Lorenzo  Community Health Worker  [facility] Care Coordination  [email or web address]  [email or web address]   Office: [phone]  Fax: [phone]

## 2024-03-19 NOTE — Clinical Note
2nd attempt unsuccessful Pt only has housing goal  Review chart If okay to change outreach frequency to 2 months due to waiting on housing

## 2024-03-25 NOTE — Clinical Note
Pt has uncontrolled anxiety, depression, PTSD and h/o bipolar. She didn't think she did, but I explained the symptoms to her and she said it sounds exactly like her. I was thinking instead of selective serotonin reuptake inhibitor's, maybe abilify or depakote. Any recommendations? Sent pt tried to contact letter on 3/25/24.

## 2024-04-02 ENCOUNTER — PATIENT OUTREACH (OUTPATIENT)
Dept: CARE COORDINATION | Facility: CLINIC | Age: 39
End: 2024-04-02
Payer: COMMERCIAL

## 2024-04-02 NOTE — PROGRESS NOTES
4/2/2024  Clinic Care Coordination Contact  Community Health Worker Follow Up    Care Gaps:     Health Maintenance Due   Topic Date Due    ASTHMA ACTION PLAN  10/22/2019    HEPATITIS B IMMUNIZATION (3 of 3 - 19+ 3-dose series) 06/08/2021    YEARLY PREVENTIVE VISIT  01/17/2023    INFLUENZA VACCINE (1) 09/01/2023    COVID-19 Vaccine (3 - 2023-24 season) 09/01/2023       Care Gaps Last addressed on 4-2-24 and Patient accepted scheduling phone number for [phone]  to schedule independently     Care Plan:   Care Plan: Housing Safety       Problem: SDOH LACK OF STABLE HOUSING       Goal: Establish Stable Housing       Start Date: 1/29/2024    Recent Progress: 10%    Priority: Medium    Note:     Barriers: Social Support  Strengths: MH case management support, comfortable navigating resources  Patient expressed understanding of goal: Yes  Action steps to achieve this goal:  1. I will talk to CC SW on 4-5-24 at 9am about housing. Updated 4-2-24 AL  2 I have CC SW follow up and connect with my   Parviz to discuss the concerns I have with my current housing and what we are working towards  2. I will continue to try and connect with my housing worker to see about being moved to a safer location for my family and I  3. I will connect with CCC team at least once a month per standard pt outreach and discuss any other needs that may arise during this time                             Clinic Care Coordination Contact  Community Health Worker Follow Up    Intervention and Education during outreach:  Received VM from patient requesting to talk to SW about housing and that she is not doing well with her depression and need help. Stated not feeling safe    Called and spoke with patient and follow up VM.  Patient reported:  -not feeling safe where she is staying and need with housing.  -not doing well with her depression and need help   Requested to talk to CC SW ASAP.  She preferred morning appt.  Scheduled telephone  appt with FEMI PALACIO 4-5-24 at 9am.    Routed to FEMI PALACIO as LIZBETH PALACIO 4-5-24 housing issues  CHW Follow up: Monthly  CHW Plan: Follow up on goal)  CHW Next Follow Up: 5-6-24    Usman Lorenzo  Community Health Worker  [facility] Care Coordination  [email or web address]   Office: [phone]  Fax: [phone]

## 2024-04-05 ENCOUNTER — PATIENT OUTREACH (OUTPATIENT)
Dept: NURSING | Facility: CLINIC | Age: 39
End: 2024-04-05
Payer: COMMERCIAL

## 2024-04-05 NOTE — LETTER
[facility] CARE COORDINATION  No address on file    April 5, 2024    Armida Quiñonez  [address]  [place] [address]      Dear Armida,    I am a clinic care coordinator who works with Physician Sonja Fischer with the [facility]. I recently tried to call and was unable to reach you. Below is a description of clinic care coordination and how I can further assist you.       The clinic care coordination team is made up of a registered nurse, , financial resource worker and community health worker who understand the health care system. The goal of clinic care coordination is to help you manage your health and improve access to the health care system. Our team works alongside your provider to assist you in determining your health and social needs. We can help you obtain health care and community resources, providing you with necessary information and education. We can work with you through any barriers and develop a care plan that helps coordinate and strengthen the communication between you and your care team.  Our services are voluntary and are offered without charge to you personally.    Please feel free to contact me with any questions or concerns regarding care coordination and what we can offer.      We are focused on providing you with the highest-quality healthcare experience possible.    Sincerely,     Tracie Barnes RAYMOND   Social Work Care Coordinator   [facility]  [phone]

## 2024-04-05 NOTE — PROGRESS NOTES
Clinic Care Coordination Contact  [facility]/Voicemail    Clinical Data: Care Coordinator Outreach    Outreach Documentation Number of Outreach Attempt   3/4/2024  11:30 AM 1   3/19/2024   4:22 PM 2   4/5/2024   9:11 AM 1       Left message on patient's voicemail with call back information and requested return call.    Plan: Care Coordinator will send unable to contact letter with care coordinator contact information via [employer]. Care Coordinator will try to reach patient again in 10 business days.    OMID Polanco   Social Work Care Coordinator   [facility]  [phone]

## 2024-04-24 ENCOUNTER — PATIENT OUTREACH (OUTPATIENT)
Dept: CARE COORDINATION | Facility: CLINIC | Age: 39
End: 2024-04-24
Payer: COMMERCIAL

## 2024-04-24 NOTE — PROGRESS NOTES
Care Coordination Clinician Chart Review    Situation: Patient chart reviewed by Care Coordinator.       Background: Care Coordination Program started: 1/24/2024. Initial assessment completed and patient-centered care plan(s) were developed with participation from patient. Lead CC handed patient off to CHW for continued outreaches.       Assessment: Per chart review, patient outreach completed by CC CHW on 4/2/2024.  Patient is actively working to accomplish goal(s). Patient's goal(s) appropriate and relevant at this time. Patient is not due for updated Plan of Care.  Assessments will be completed annually or as needed/with change of patient status.      Care Plan: Housing Safety       Problem: SDOH LACK OF STABLE HOUSING       Goal: Establish Stable Housing       Start Date: 1/29/2024    Recent Progress: 10%    Priority: Medium    Note:     Barriers: Social Support  Strengths: MH case management support, comfortable navigating resources  Patient expressed understanding of goal: Yes  Action steps to achieve this goal:  1. I will talk to CC SW on 4-5-24 at 9am about housing. Updated 4-2-24 AL  2 I have CC SW follow up and connect with my   Parviz to discuss the concerns I have with my current housing and what we are working towards  2. I will continue to try and connect with my housing worker to see about being moved to a safer location for my family and I  3. I will connect with CCC team at least once a month per standard pt outreach and discuss any other needs that may arise during this time                                  Plan/Recommendations: The patient will continue working with Care Coordination to achieve goal(s) as above. CHW will continue outreaches at minimum every 30 days and will involve Lead CC as needed or if patient is ready to move to Maintenance. Lead CC will continue to monitor CHW outreaches and patient's progress to goal(s) every 6 weeks.     Plan of Care updated and sent to patient:  No    Tracie Barnes, [facility]   Social Work Care Coordinator   [facility]  [phone]

## 2024-04-30 ENCOUNTER — PATIENT OUTREACH (OUTPATIENT)
Dept: CARE COORDINATION | Facility: CLINIC | Age: 39
End: 2024-04-30
Payer: COMMERCIAL

## 2024-04-30 NOTE — PROGRESS NOTES
4/30/2024  Clinic Care Coordination Contact  Community Health Worker Follow Up    Care Gaps:     Health Maintenance Due   Topic Date Due    ASTHMA ACTION PLAN  10/22/2019    HEPATITIS B IMMUNIZATION (3 of 3 - 19+ 3-dose series) 06/08/2021    YEARLY PREVENTIVE VISIT  01/17/2023    INFLUENZA VACCINE (1) 09/01/2023    COVID-19 Vaccine (3 - 2023-24 season) 09/01/2023    DEPRESSION 12 MO INDEX REPEAT PHQ-9  04/13/2024    ASTHMA CONTROL TEST  05/15/2024       Care Gaps Last addressed on 4-30-24, Care Gap Goal set: Yes, and Scheduled 6-3-24 at 2:30pm PCP      Care Plan:   Care Plan: Housing Safety       Problem: SDOH LACK OF STABLE HOUSING       Goal: Establish Stable Housing       Start Date: 1/29/2024 Expected End Date: 6/28/2024    This Visit's Progress: 80% Recent Progress: 10%    Priority: Medium    Note:     Barriers: Social Support  Strengths:  case management support, comfortable navigating resources  Patient expressed understanding of goal: Yes  Action steps to achieve this goal:  1. I will be working with my  and   Parviz to help search for a place that accept Section 8 Voucher  2. I will search on [email or web address] website for a place and connect with  for support with housing process.  Updated 4-30-24 AL                            Care Plan: Yearly Preventative Visit       Problem: HP GENERAL PROBLEM       Goal: I will attend and complete my Yearly Preventative Visit on 6-3-24 at 2:20pm.       Start Date: 4/30/2024 Expected End Date: 6/28/2024    This Visit's Progress: 90%    Note:     Barriers: lack info of next yearly check up  Strengths: motivate to attend appt, support from [facility] team  Patient expressed understanding of goal: yes  Action steps to achieve this goal:  1. I will attend my Yearly Preventative Visit on 6-3-24 at 2:20pm at [facility] with ROHITH Saenz                          Clinic Care Coordination Contact  [facility]  Worker Follow Up    Intervention and Education during outreach:  Called and spoke with patient  and follow up on goal(s).  Patient reported:  -doing well  -approved for Section 8 Housing Voucher.  -she has given 60 days notice to current apartment  -she is in the process of looking and searching for a place that accept Section 8 Housing Voucher.  -has support from  and   to find housing.  Encouraged patient to continue to seek support from .  Provided patient website to [employer] to assist with searching for Section 8 house or apartment.  Patient preferred to email her the website forgot login info to access [employer]    She would resources with getting furniture when she moves to new place.  Will schedule appt with CC SW regarding referral to [employer].    Reviewed care gaps due for AWV  Patient agreed to schedule today.  Conference call with patient and connected with  to schedule AWV appt.  Appt scheduled for 6-3-24 at 2:20pm with ROHITH Saenz     Routed to CC SW review goals/action steps    Email [email or web address]  Has / for support  Waiting [place] housing  to help search for Section 8 housing    CHW Follow up: Monthly  CHW Plan: Follow up on goal (s)  CHW Next Follow Up: 5-30-24    Usman Lorenzo  Community Health Worker  [facility] Care Coordination  [email or web address]  [email or web address]   Office: [phone]  Fax: [phone]

## 2024-05-29 ENCOUNTER — PATIENT OUTREACH (OUTPATIENT)
Dept: CARE COORDINATION | Facility: CLINIC | Age: 39
End: 2024-05-29
Payer: COMMERCIAL
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
## 2024-05-29 NOTE — PROGRESS NOTES
Clinic Care Coordination Contact      CC SW sent updated care plan to pt via mail/Dakimhart as pt was due for an updated one to be sent as it reached 90 days or there was a change in pt condition.    OMID Polanco   Social Work Care Coordinator   [facility]    [phone]

## 2024-05-30 ENCOUNTER — PATIENT OUTREACH (OUTPATIENT)
Dept: CARE COORDINATION | Facility: CLINIC | Age: 39
End: 2024-05-30
Payer: COMMERCIAL

## 2024-05-30 NOTE — PROGRESS NOTES
5/30/2024  Clinic Care Coordination Contact  [facility]/Voicemail    Clinical Data: Care Coordinator Outreach    Outreach Documentation Number of Outreach Attempt   5/30/2024   2:46 PM 1       Left message on patient's voicemail with call back information and requested return call.  Reminded of AWV appt on 6-3-24 at 2:20pm  Plan: Care Coordinator will try to reach patient again in 10 business days.    CHW follow up: 2nd attempt 6-12-24    Usman Lorenzo  Community Health Worker  [facility]  Clinic Care Coordination  [email or web address]   Office: [phone]  Fax: [phone]

## 2024-06-10 ENCOUNTER — PATIENT OUTREACH (OUTPATIENT)
Dept: CARE COORDINATION | Facility: CLINIC | Age: 39
End: 2024-06-10
Payer: COMMERCIAL

## 2024-06-10 NOTE — Clinical Note
Review chart 2nd attempt unsuccessful Only housing goal left Pt rescheduled AWV to 7-5-24 goal completed

## 2024-06-10 NOTE — PROGRESS NOTES
6/10/2024  Clinic Care Coordination Contact  [facility]/Voicemail    Clinical Data: Care Coordinator Outreach    Outreach Documentation Number of Outreach Attempt   5/30/2024   2:46 PM 1   6/10/2024  11:26 AM 2       Care Coordinator Outreach: follow up on goal(s)    Left message on patient's voicemail with call back information and requested return call.    Plan: Care Coordinator routed to CC SW to review chart before sending disenrollment letter with care coordinator contact information via mail.     Pt re scheduled AWV to 7-5-24 at 8am. Goal completed    Care Coordinator will wait for CC SW to respond by 6-17-24    Usman Lorenzo  Community Health Worker  [facility] Care Coordination  [email or web address]  [email or web address]   Office: [phone]  Fax: [phone]

## 2024-06-10 NOTE — PROGRESS NOTES
6/10/2024  Clinic Care Coordination Contact  Community Health Worker Follow Up    Care Gaps:     Health Maintenance Due   Topic Date Due    ASTHMA ACTION PLAN  10/22/2019    HEPATITIS B IMMUNIZATION (3 of 3 - 19+ 3-dose series) 06/08/2021    YEARLY PREVENTIVE VISIT  01/17/2023    COVID-19 Vaccine (3 - 2023-24 season) 09/01/2023    DEPRESSION 12 MO INDEX REPEAT PHQ-9  04/13/2024    ASTHMA CONTROL TEST  05/15/2024       Care Gap Goal set: Yes and Scheduled 7-5-24 at 8am      Care Plan:   Care Plan: Housing Safety       Problem: SDOH LACK OF STABLE HOUSING       Goal: Establish Stable Housing       Start Date: 1/29/2024 Expected End Date: 7/31/2024    This Visit's Progress: 70% Recent Progress: 80%    Priority: Medium    Note:     Barriers: Social Support  Strengths:  case management support, comfortable navigating resources  Patient expressed understanding of goal: Yes  Action steps to achieve this goal:  1. I will wait to get a call from   [employer] [phone] for support to help search for a place that accept Section 8 Voucher  2. I will search on [employer] website for a place and connect with  for support with housing process.  Updated 6-10-24 AL                            Care Plan: In Home Services and support       Problem: HP GENERAL PROBLEM       Goal: I would like support to connect with [facility] services or programs for in home services and support within 1-3 months.       Start Date: 6/10/2024 Expected End Date: 8/30/2024    This Visit's Progress: 20%    Note:     Barriers: access to infor regarding services   Strengths: support from children  Patient expressed understanding of goal: yes  Action steps to achieve this goal:  1. I  will talk to  PIA on 6-12-24 at 10am for support regarding getting ILS worker and homemaker to support in the home.                                  Clinic Care Coordination Contact  Community Health Worker Follow Up    Intervention and  Education during outreach:   Called and spoke with patient and follow up on goal(s).  Patient reported:  -still no housing. She has been calling to her  but she never calls back  -she received the food packet from [employer] with the food resources info to the [employer] mobile, but did not get the $40 [employer] food gift card  -will get approved for SNAP benefit    Conference call with patient to connect with  [employer] [phone]  Left VM for  to call patient back regarding housing status.  She has MH   Stated she needs help with going to grocery  Patient stated she would an ARMHS worker and need a PCA.  Clarify role of PCA  Patient would like someone to take her to grocery and clean and cook.  Informed pt homemaker and ILS worker support with those services but that she needs a CADI waiver.  Patient stated she would to obtain CADI waiver services  Scheduled CC  appt 6-12-24 at 10am referral for waiver     Routed to [facility] as I   CHW sent message to [facility] [employer] that pt did not get the $40 gift card to [employer] food    CHW Follow up: Monthly  CHW Plan: Follow up on goal (s)  CHW Next Follow Up: 7-16-24    Usman Lorenzo  Community Health Worker  [facility] Care Coordination  [email or web address]  [email or web address]   Office: [phone]  Fax: [phone]

## 2024-06-12 ENCOUNTER — PATIENT OUTREACH (OUTPATIENT)
Dept: NURSING | Facility: CLINIC | Age: 39
End: 2024-06-12
Payer: COMMERCIAL

## 2024-06-12 NOTE — PROGRESS NOTES
Clinic Care Coordination Contact  [facility]/Voicemail    Clinical Data: Care Coordinator Outreach    Outreach Documentation Number of Outreach Attempt   5/30/2024   2:46 PM 1   6/10/2024  11:26 AM 2   6/12/2024  10:06 AM 1       Left message on patient's voicemail with call back information and requested return call.    Plan: Care Coordinator will try to reach patient again in 10 business days.    OMID Polanco   Social Work Care Coordinator   [facility]  [phone]

## 2024-06-12 NOTE — PROGRESS NOTES
Clinic Care Coordination Contact  Follow Up Progress Note      Assessment: FEMI PALACIO received call back from pt    Care Gaps:    Health Maintenance Due   Topic Date Due    ASTHMA ACTION PLAN  10/22/2019    HEPATITIS B IMMUNIZATION (3 of 3 - 19+ 3-dose series) 06/08/2021    YEARLY PREVENTIVE VISIT  01/17/2023    COVID-19 Vaccine (3 - 2023-24 season) 09/01/2023    DEPRESSION 12 MO INDEX REPEAT PHQ-9  04/13/2024    ASTHMA CONTROL TEST  05/15/2024       Postponed to a later date due to nature of conversation     Care Plans  Care Plan: Housing Safety       Problem: SDOH LACK OF STABLE HOUSING       Goal: Establish Stable Housing       Start Date: 1/29/2024 Expected End Date: 7/31/2024    This Visit's Progress: 70% Recent Progress: 80%    Priority: Medium    Note:     Barriers: Social Support  Strengths:  case management support, comfortable navigating resources  Patient expressed understanding of goal: Yes  Action steps to achieve this goal:  1. I will wait to get a call from   [employer] [phone] for support to help search for a place that accept Section 8 Voucher  2. I will search on [employer] website for a place and connect with  for support with housing process.  Updated 6-10-24 AL                            Care Plan: In Home Services and support       Problem: HP GENERAL PROBLEM       Goal: I would like support to connect with [facility] services or programs for in home services and support within 1-3 months.       Start Date: 6/10/2024 Expected End Date: 8/30/2024    This Visit's Progress: 20%    Note:     Barriers: access to infor regarding services   Strengths: support from children  Patient expressed understanding of goal: yes  Action steps to achieve this goal:  1. I  will talk to FEMI PALACIO on 6-12-24 at 10am for support regarding getting ILS worker and homemaker to support in the home.                                FEMI PALACIO received a callback from pt this afternoon. SW explained why  she was calling and discussed immediate needs/concerns. Pt reported being interested in applying for CADI waiver due to identified needs for additional support. Pt states that she feels like she could benefit from an ILS worker. She already has an ARMHS and a MH , but she doesn't feel like that is adequate enough support. SW discussed therapy/counseling services with pt. Pt stated that she had psychiatry/counseling in the past but stopped it as she identified having difficulty with attending the appointments due to forgetting. SW and pt discussed changing/transitioning mh case management agencies and pt confirmed being interested to see what else is out there.    Intervention/Education provided during outreach: Case management/ARMHS, therapy, psychiatry,     Plan: Pt to connect with [facility] and connect with current MH agency about potentially changing where to receive MH support    Care Coordinator will follow up in 4 weeks per standard pt outreach    OMID Polanco   Social Work Care Coordinator   [facility]  [phone]

## 2024-06-20 ENCOUNTER — PATIENT OUTREACH (OUTPATIENT)
Dept: CARE COORDINATION | Facility: CLINIC | Age: 39
End: 2024-06-20
Payer: COMMERCIAL

## 2024-06-20 NOTE — PROGRESS NOTES
Care Coordination Clinician Chart Review    Situation: Patient chart reviewed by Care Coordinator.       Background: Care Coordination Program started: 1/24/2024. Initial assessment completed and patient-centered care plan(s) were developed with participation from patient. Lead CC handed patient off to CHW for continued outreaches.       Assessment: Per chart review, patient outreach completed by CC CHW on 6/10/2024.  Patient is actively working to accomplish goal(s). Patient's goal(s) appropriate and relevant at this time. Patient is not due for updated Plan of Care.  Assessments will be completed annually or as needed/with change of patient status.      Care Plan: Housing Safety       Problem: SDOH LACK OF STABLE HOUSING       Goal: Establish Stable Housing       Start Date: 1/29/2024 Expected End Date: 7/31/2024    This Visit's Progress: 70% Recent Progress: 80%    Priority: Medium    Note:     Barriers: Social Support  Strengths:  case management support, comfortable navigating resources  Patient expressed understanding of goal: Yes  Action steps to achieve this goal:  1. I will wait to get a call from   [employer] [phone] for support to help search for a place that accept Section 8 Voucher  2. I will search on [employer] website for a place and connect with  for support with housing process.  Updated 6-10-24 AL                            Care Plan: In Home Services and support       Problem: HP GENERAL PROBLEM       Goal: I would like support to connect with [facility] services or programs for in home services and support within 1-3 months.       Start Date: 6/10/2024 Expected End Date: 8/30/2024    This Visit's Progress: 20%    Note:     Barriers: access to infor regarding services   Strengths: support from children  Patient expressed understanding of goal: yes  Action steps to achieve this goal:  1. I  will talk to CC PIA on 6-12-24 at 10am for support regarding getting ILS  worker and homemaker to support in the home.                                     Plan/Recommendations: The patient will continue working with Care Coordination to achieve goal(s) as above. CHW will continue outreaches at minimum every 30 days and will involve Lead CC as needed or if patient is ready to move to Maintenance. Lead CC will continue to monitor CHW outreaches and patient's progress to goal(s) every 6 weeks.     Plan of Care updated and sent to patient: OMID Escalera   Social Work Care Coordinator   [facility]  [phone]

## 2024-06-30 ENCOUNTER — HEALTH MAINTENANCE LETTER (OUTPATIENT)
Age: 39
End: 2024-06-30

## 2024-07-12 ENCOUNTER — PATIENT OUTREACH (OUTPATIENT)
Dept: CARE COORDINATION | Facility: CLINIC | Age: 39
End: 2024-07-12
Payer: COMMERCIAL

## 2024-07-12 NOTE — PROGRESS NOTES
7/12/2024  Clinic Care Coordination Contact  Community Health Worker Follow Up    Intervention and Education during outreach:   Called and spoke with patient  and follow up on goal(s).  Patient reported:  -been feeling depress because she had put her dog down.  Dog passed away last week.  Expressed my condolences  -housing still the same don't feel safe at her home.  -  [employer] quit [phone] quit unable to connect with anyone to help search for a place that accept Section 8 Voucher.  -She needs to down size to a 2 bedroom   Suggested search on [employer] website.  Patient to search on [employer] website  Will send info to [employer].  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  -still have to wait for 6 months for [facility] to call  or assessment  Scheduled telephone appt with  PIA 7-19-24 at 9am support safety with housing situation and grief support, [facility] services.    [facility] 7-19-24 at 9am   CHW Follow up: Monthly  CHW Plan: Follow up on goal (s)  CHW Next Follow Up: 8-22-24    Usman Lorenzo  Community Health Worker  [facility]  Clinic Care Coordination  [email or web address]   Office: [phone]  Fax: [phone]

## 2024-07-12 NOTE — Clinical Note
Scheduled telephone appt with FEMI PALACIO 7-19-24 at 9am support regarding safety concerns with current housing and grief support, dog passed away last week, still no  [facility] services.

## 2024-07-19 ENCOUNTER — PATIENT OUTREACH (OUTPATIENT)
Dept: NURSING | Facility: CLINIC | Age: 39
End: 2024-07-19
Payer: COMMERCIAL

## 2024-07-19 NOTE — PROGRESS NOTES
Clinic Care Coordination Contact  Follow Up Progress Note      Assessment: CC PIA connect with pt regarding housing and case management concerns    Care Gaps:    Health Maintenance Due   Topic Date Due    ASTHMA ACTION PLAN  10/22/2019    HEPATITIS B IMMUNIZATION (3 of 3 - 19+ 3-dose series) 06/08/2021    YEARLY PREVENTIVE VISIT  01/17/2023    COVID-19 Vaccine (3 - 2023-24 season) 09/01/2023    DEPRESSION 12 MO INDEX REPEAT PHQ-9  04/13/2024    ASTHMA CONTROL TEST  05/15/2024       Postponed to a later date due to nature of conversation     Care Plans  Care Plan: Housing Safety       Problem: SDOH LACK OF STABLE HOUSING       Goal: Establish Stable Housing       Start Date: 1/29/2024 Expected End Date: 9/30/2024    This Visit's Progress: 70% Recent Progress: 70%    Priority: Medium    Note:     Barriers: Social Support  Strengths: MH case management support, comfortable navigating resources  Patient expressed understanding of goal: Yes  Action steps to achieve this goal:    [employer] [phone] quit unable to connect with anyone to help search for a place that accept Section 8 Voucher  I will go to [employer] website search for place that accept Section 8 Voucher.  2.  I will talk to CC PIA on 7-19-24 at 9am for support with address safety housing situation  Updated 7-12-24 AL                            Care Plan: In Home Services and support       Problem: HP GENERAL PROBLEM       Goal: I would like support to connect with [facility] services or programs for in home services and support within 1-3 months.       Start Date: 6/10/2024 Expected End Date: 8/30/2024    This Visit's Progress: 20% Recent Progress: 20%    Note:     Barriers: access to infor regarding services   Strengths: support from children  Patient expressed understanding of goal: yes  Action steps to achieve this goal:  1. I  will talk to CC PIA on 7-19- at 9am  for support regarding getting ILS worker and homemaker to support in the home.  2.  I will wait for county to call and schedule assessment  Updated 7-12-24 AL                              FEMI PALACIO called and connected with pt this morning to discuss housing concerns as well as  updates. Pt reports that her previous  had quit and she ended up finding and getting established with a different case management agency that can also do counseling and psychiatry. Pt talked with [employer] and will be having her assessment appt within the next 4-5 weeks. Pt reported feeling left behind by her previous agency and worker as they did not notify her of her worker no longer being employed.     Pt and SW dicussed her current housing situation. Pt reports that she is still waiting to get out of her current apartment as she has been feeling unsafe and doesn't think she is in a good environment due to being followed/watched. SW urged pt to connect with the [facility] office to discuss the concerns as they do make accommodations for safety and would be the best people to work with to get out of her current place.    Intervention/Education provided during outreach: [facility]    Plan: Pt plans to connect with [facility] rental office to speak with someone about moving into a different apartment due to safety concerns    Care Coordinator will follow up in 4 weeks per standard pt outreach

## 2024-08-05 ENCOUNTER — PATIENT OUTREACH (OUTPATIENT)
Dept: CARE COORDINATION | Facility: CLINIC | Age: 39
End: 2024-08-05
Payer: COMMERCIAL

## 2024-08-05 NOTE — PROGRESS NOTES
Care Coordination Clinician Chart Review    Situation: Patient chart reviewed by Care Coordinator.       Background: Care Coordination Program started: 1/24/2024. Initial assessment completed and patient-centered care plan(s) were developed with participation from patient. Lead CC handed patient off to CHW for continued outreaches.       Assessment: Per chart review, patient outreach completed by CC CHW on 7/12/2024.  Patient is actively working to accomplish goal(s). Patient's goal(s) appropriate and relevant at this time. Patient is not due for updated Plan of Care.  Assessments will be completed annually or as needed/with change of patient status.      Care Plan: Housing Safety       Problem: SDOH LACK OF STABLE HOUSING       Goal: Establish Stable Housing       Start Date: 1/29/2024 Expected End Date: 9/30/2024    This Visit's Progress: 70% Recent Progress: 70%    Priority: Medium    Note:     Barriers: Social Support  Strengths: MH case management support, comfortable navigating resources  Patient expressed understanding of goal: Yes  Action steps to achieve this goal:    [employer] [phone] quit unable to connect with anyone to help search for a place that accept Section 8 Voucher  I will go to [employer] website search for place that accept Section 8 Voucher.  2.  I will talk to CC PIA on 7-19-24 at 9am for support with address safety housing situation  Updated 7-12-24 AL                            Care Plan: In Home Services and support       Problem: HP GENERAL PROBLEM       Goal: I would like support to connect with [facility] services or programs for in home services and support within 1-3 months.       Start Date: 6/10/2024 Expected End Date: 8/30/2024    This Visit's Progress: 20% Recent Progress: 20%    Note:     Barriers: access to infor regarding services   Strengths: support from children  Patient expressed understanding of goal: yes  Action steps to achieve this goal:  1. I  will talk to  CC SW on 7-19- at 9am  for support regarding getting ILS worker and homemaker to support in the home.  2. I will wait for county to call and schedule assessment  Updated 7-12-24 AL                                   Plan/Recommendations: The patient will continue working with Care Coordination to achieve goal(s) as above. CHW will continue outreaches at minimum every 30 days and will involve Lead CC as needed or if patient is ready to move to Maintenance. Lead CC will continue to monitor CHW outreaches and patient's progress to goal(s) every 6 weeks.     Plan of Care updated and sent to patient: OMID Escalera   Social Work Care Coordinator   [facility]  [phone]

## 2024-08-10 ENCOUNTER — OFFICE VISIT (OUTPATIENT)
Dept: URGENT CARE | Facility: URGENT CARE | Age: 39
End: 2024-08-10
Payer: COMMERCIAL

## 2024-08-10 VITALS
HEART RATE: 70 BPM | DIASTOLIC BLOOD PRESSURE: 60 MMHG | SYSTOLIC BLOOD PRESSURE: 112 MMHG | BODY MASS INDEX: 47.3 KG/M2 | WEIGHT: 267 LBS | OXYGEN SATURATION: 99 % | TEMPERATURE: 97.1 F | RESPIRATION RATE: 20 BRPM

## 2024-08-10 DIAGNOSIS — M54.50 ACUTE BILATERAL LOW BACK PAIN WITHOUT SCIATICA: Primary | ICD-10-CM

## 2024-08-10 DIAGNOSIS — B37.31 YEAST INFECTION OF THE VAGINA: ICD-10-CM

## 2024-08-10 DIAGNOSIS — N76.0 BV (BACTERIAL VAGINOSIS): ICD-10-CM

## 2024-08-10 DIAGNOSIS — B96.89 BV (BACTERIAL VAGINOSIS): ICD-10-CM

## 2024-08-10 LAB
ALBUMIN UR-MCNC: NEGATIVE MG/DL
APPEARANCE UR: CLEAR
BILIRUB UR QL STRIP: NEGATIVE
CLUE CELLS: PRESENT
COLOR UR AUTO: YELLOW
GLUCOSE UR STRIP-MCNC: NEGATIVE MG/DL
HCG UR QL: NEGATIVE
HGB UR QL STRIP: NEGATIVE
KETONES UR STRIP-MCNC: NEGATIVE MG/DL
LEUKOCYTE ESTERASE UR QL STRIP: NEGATIVE
NITRATE UR QL: NEGATIVE
PH UR STRIP: 5.5 [PH] (ref 5–7)
SP GR UR STRIP: >=1.03 (ref 1–1.03)
TRICHOMONAS, WET PREP: ABNORMAL
UROBILINOGEN UR STRIP-ACNC: 0.2 E.U./DL
WBC'S/HIGH POWER FIELD, WET PREP: ABNORMAL
YEAST, WET PREP: PRESENT

## 2024-08-10 PROCEDURE — 87491 CHLMYD TRACH DNA AMP PROBE: CPT | Performed by: PHYSICIAN ASSISTANT

## 2024-08-10 PROCEDURE — 87210 SMEAR WET MOUNT SALINE/INK: CPT | Performed by: PHYSICIAN ASSISTANT

## 2024-08-10 PROCEDURE — 99214 OFFICE O/P EST MOD 30 MIN: CPT | Performed by: PHYSICIAN ASSISTANT

## 2024-08-10 PROCEDURE — 81025 URINE PREGNANCY TEST: CPT | Performed by: PHYSICIAN ASSISTANT

## 2024-08-10 PROCEDURE — 87591 N.GONORRHOEAE DNA AMP PROB: CPT | Performed by: PHYSICIAN ASSISTANT

## 2024-08-10 PROCEDURE — 81003 URINALYSIS AUTO W/O SCOPE: CPT

## 2024-08-10 RX ORDER — CYCLOBENZAPRINE HCL 10 MG
10 TABLET ORAL
Qty: 14 TABLET | Refills: 0 | Status: SHIPPED | OUTPATIENT
Start: 2024-08-10

## 2024-08-10 RX ORDER — METRONIDAZOLE 500 MG/1
500 TABLET ORAL 2 TIMES DAILY
Qty: 14 TABLET | Refills: 0 | Status: SHIPPED | OUTPATIENT
Start: 2024-08-10 | End: 2024-08-17

## 2024-08-10 RX ORDER — FLUCONAZOLE 150 MG/1
150 TABLET ORAL
Qty: 2 TABLET | Refills: 0 | Status: SHIPPED | OUTPATIENT
Start: 2024-08-10 | End: 2024-08-18

## 2024-08-10 ASSESSMENT — PAIN SCALES - GENERAL: PAINLEVEL: WORST PAIN (10)

## 2024-08-11 LAB
C TRACH DNA SPEC QL NAA+PROBE: NEGATIVE
N GONORRHOEA DNA SPEC QL NAA+PROBE: NEGATIVE
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
## 2024-08-22 ENCOUNTER — PATIENT OUTREACH (OUTPATIENT)
Dept: CARE COORDINATION | Facility: CLINIC | Age: 39
End: 2024-08-22
Payer: COMMERCIAL
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
## 2024-09-06 ENCOUNTER — PATIENT OUTREACH (OUTPATIENT)
Dept: CARE COORDINATION | Facility: CLINIC | Age: 39
End: 2024-09-06
Payer: COMMERCIAL

## 2024-09-06 NOTE — PROGRESS NOTES
Clinic Care Coordination Contact    CC SW sent updated care plan to pt via mail/[employer]terPointhart as pt was due for an updated one to be sent as it reached 90 days or there was a change in pt condition.    OMID Polanco   Social Work Care Coordinator   [facility]    [phone]
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
## 2024-09-20 ENCOUNTER — PATIENT OUTREACH (OUTPATIENT)
Dept: CARE COORDINATION | Facility: CLINIC | Age: 39
End: 2024-09-20
Payer: COMMERCIAL

## 2024-09-20 NOTE — PROGRESS NOTES
Care Coordination Clinician Chart Review    Situation: Patient chart reviewed by Care Coordinator.       Background: Care Coordination Program started: 1/24/2024. Initial assessment completed and patient-centered care plan(s) were developed with participation from patient. Lead CC handed patient off to CHW for continued outreaches.       Assessment: Per chart review, patient outreach completed by CC CHW on 9/6/2024.  Patient is actively working to accomplish goal(s). Patient's goal(s) appropriate and relevant at this time. Patient is due for updated Plan of Care.  Assessments will be completed annually or as needed/with change of patient status.      Care Plan: Housing Safety       Problem: SDOH LACK OF STABLE HOUSING       Goal: Establish Stable Housing       Start Date: 1/29/2024 Expected End Date: 9/30/2024    This Visit's Progress: 70% Recent Progress: 70%    Priority: Medium    Note:     Barriers: Social Support  Strengths: MH case management support, comfortable navigating resources  Patient expressed understanding of goal: Yes  Action steps to achieve this goal:  I will go to [employer] website search for places that accept Section 8 Voucher.  2.  I will talk to [employer] for support with address safety housing situation  Updated 8-22-24 AL                            Care Plan: Yearly Preventative Visit 7-5-24 Completed 6/10/2024      Problem: HP GENERAL PROBLEM  Resolved 6/10/2024      Goal: I have scheduled my Yearly Preventative Visit on 7-5-24.  Completed 6/10/2024      Start Date: 6/10/2024 Expected End Date: 7/31/2024    This Visit's Progress: 100% Recent Progress: 90%    Note:     Barriers: lack info of next yearly check up  Strengths: motivate to attend appt, support from CCC team  Patient expressed understanding of goal: yes  Action steps to achieve this goal:  1. I will attend my Yearly Preventative Visit on 7-5-24 at 8am at [facility] with ROHITH Saenz                             Care Plan: In Home Services and support       Problem: HP GENERAL PROBLEM       Goal: ON HOLD UNTIL I have stable housing. I would like support to connect with [facility] services or programs for in home services and support within 1-3 months.       Start Date: 6/10/2024 Expected End Date: 12/31/2024    This Visit's Progress: On Hold Recent Progress: 20%    Note:     Barriers: access to infor regarding services   Strengths: support from children  Patient expressed understanding of goal: yes  Action steps to achieve this goal:  1. ON HOLD UNTIL I have stable housing.  2. I will wait for [facility] to call and schedule assessmentfor support regarding getting ILS worker and homemaker to support in the home.  Updated 8-22-24 AL                              Care Plan: Yearly Preventative Visit       Problem: HP GENERAL PROBLEM       Goal: I will schedule Yearly Preventative visit and discuss health maintenance within 1-3 months.       Start Date: 8/22/2024 Expected End Date: 11/25/2024    This Visit's Progress: 20%    Note:     Barriers: access to when next annual wellness visit  Strengths: support from   Patient expressed understanding of goal: yes  Action steps to achieve this goal:  1. I  will call to [facility] to schedule Yearly Preventative Visit [phone]                                     Plan/Recommendations: The patient will continue working with Care Coordination to achieve goal(s) as above. CHW will continue outreaches at minimum every 30 days and will involve Lead CC as needed or if patient is ready to move to Maintenance. Lead CC will continue to monitor CHW outreaches and patient's progress to goal(s) every 6 weeks.     Plan of Care updated and sent to patient: Yes, via OMID Hogue   Social Work Care Coordinator   [facility]  [phone]
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
## 2024-09-24 ENCOUNTER — PATIENT OUTREACH (OUTPATIENT)
Dept: CARE COORDINATION | Facility: CLINIC | Age: 39
End: 2024-09-24
Payer: COMMERCIAL

## 2024-09-24 NOTE — PROGRESS NOTES
9/24/2024  Clinic Care Coordination Contact  [facility]/Voicemail    Clinical Data: Care Coordinator Outreach    Outreach Documentation Number of Outreach Attempt   9/24/2024   3:37 PM 1       Left message on patient's voicemail with call back information and requested return call.    Plan: Care Coordinator will try to reach patient again in 10 business days.    CHW follow up: 2nd attempt 10-8-24    Usman Lorenzo  Community Health Worker  [facility] Care Coordination  [email or web address]   Office: [phone]  Fax: [phone]

## 2024-10-07 ENCOUNTER — PATIENT OUTREACH (OUTPATIENT)
Dept: CARE COORDINATION | Facility: CLINIC | Age: 39
End: 2024-10-07
Payer: COMMERCIAL

## 2024-10-07 NOTE — Clinical Note
Dr Ingram Patient has appt with you on 10-21-24. Pt wants to know when she can do mammogram because her grandmother had breast cancer and also wants to know if she is going through menopause because she reported of night sweats and irregular cycles.

## 2024-10-07 NOTE — PROGRESS NOTES
10/7/2024  Clinic Care Coordination Contact  [facility]/Voicemail    Clinical Data: Care Coordinator Outreach    Outreach Documentation Number of Outreach Attempt   9/24/2024   3:37 PM 1   10/7/2024   2:56 PM 1     Care Coordinator Outreach: follow up on goal(s)    Phone went straight to . Left message on patient's voicemail with call back information and requested return call.    Plan: Care Coordinator routed to [facility] to review chart before sending disenrollment letter with care coordinator contact information via mail.     Care Coordinator will wait for [facility] review and to respond by 10-14-24    Usman Lorenzo  [facility] Health Worker  [facility] Care Coordination  [email or web address]   Office: [phone]  Fax: [phone]        Usman Lorenzo  [facility] Health Worker  [facility] Care Coordination  [email or web address]  [email or web address]   Office: [phone]  Fax: [phone]

## 2024-10-08 ENCOUNTER — MYC MEDICAL ADVICE (OUTPATIENT)
Dept: CARE COORDINATION | Facility: CLINIC | Age: 39
End: 2024-10-08
Payer: COMMERCIAL
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
## 2024-11-06 ENCOUNTER — PATIENT OUTREACH (OUTPATIENT)
Dept: CARE COORDINATION | Facility: CLINIC | Age: 39
End: 2024-11-06
Payer: COMMERCIAL

## 2024-11-06 DIAGNOSIS — M54.50 ACUTE BILATERAL LOW BACK PAIN WITHOUT SCIATICA: ICD-10-CM

## 2024-11-06 NOTE — TELEPHONE ENCOUNTER
Having back pain. Started a week ago.  Asking for the muscle relaxant refill.  Pended from med list.    1/24/24 was last OV with [facility] provider- Andra.    OK to leave a detailed message.    Estela Rodriguez RN-[facility]

## 2024-11-06 NOTE — PROGRESS NOTES
Care Coordination Clinician Chart Review    Situation: Patient chart reviewed by Care Coordinator.       Background: Care Coordination Program started: 1/24/2024. Initial assessment completed and patient-centered care plan(s) were developed with participation from patient. Lead CC handed patient off to CHW for continued outreaches.       Assessment: Per chart review, patient outreach completed by CC CHW on 10/7/2024.  Patient is actively working to accomplish goal(s). Patient's goal(s) appropriate and relevant at this time. Patient is not due for updated Plan of Care.  Assessments will be completed annually or as needed/with change of patient status.      Care Plan: Housing Safety       Problem: SDOH LACK OF STABLE HOUSING       Goal: Establish Stable Housing       Start Date: 1/29/2024 Expected End Date: 9/30/2024    Recent Progress: 70%    Priority: Medium    Note:     Barriers: Social Support  Strengths:  case management support, comfortable navigating resources  Patient expressed understanding of goal: Yes  Action steps to achieve this goal:  I will login into [employer] website to search for places that accept Section 8 Voucher.  2.  I will call [facility] Intake Line [phone] M-F 9am to 3pm for legal resources/tenant rights  3. I will wait to get support from [facility]  at [employer] for support with address safety housing situation  Updated 10-8-24 AL                            Care Plan: Yearly Preventative Visit 7-5-24 Completed 6/10/2024      Problem: HP GENERAL PROBLEM  Resolved 6/10/2024      Goal: I have scheduled my Yearly Preventative Visit on 7-5-24.  Completed 6/10/2024      Start Date: 6/10/2024 Expected End Date: 7/31/2024    This Visit's Progress: 100% Recent Progress: 90%    Note:     Barriers: lack info of next yearly check up  Strengths: motivate to attend appt, support from CCC team  Patient expressed understanding of goal: yes  Action steps to achieve this goal:  1. I  will attend my Yearly Preventative Visit on 7-5-24 at 8am at [facility] with ROHITH Saenz                            Care Plan: In Home Services and support       Problem: HP GENERAL PROBLEM       Goal: ON HOLD UNTIL I have stable housing. I would like support to connect with [facility] services or programs for in home services and support within 1-3 months.       Start Date: 6/10/2024 Expected End Date: 12/31/2024    This Visit's Progress: On Hold Recent Progress: On Hold    Note:     Barriers: access to infor regarding services   Strengths: support from children  Patient expressed understanding of goal: yes  Action steps to achieve this goal:  1. ON HOLD UNTIL I have stable housing.  2. I will wait for [facility] to call and schedule assessment for support regarding getting ILS worker and homemaker to support in the home.  Updated 10-8-24 AL                              Care Plan: Yearly Preventative Visit       Problem: HP GENERAL PROBLEM       Goal: I attend and complete Yearly Preventative visit and discuss health maintenance with the doctor on 10-21-24.       Start Date: 9/24/2024 Expected End Date: 11/25/2024    Recent Progress: 90%    Note:     Barriers: access to when next annual wellness visit  Strengths: support from   Patient expressed understanding of goal: yes  Action steps to achieve this goal:  1. I  will attend Yearly Preventative Visit on 10-21-24 with PCP at [facility]  Updated 108-24 AL                                Care Plan: Mammogram       Problem: HP GENERAL PROBLEM       Goal: I will attend and complete my mammogram screening on 10-21-24       Start Date: 10/8/2024 Expected End Date: 11/29/2024    This Visit's Progress: 60%    Note:     Barriers: don't know mammogram is due  Strengths: support from family  Patient expressed understanding of goal: yes  Action steps to achieve this goal:  1. I will attend my mammogram on 10-21-24 at [facility]  2. I will call [phone] if I need  to reschedule or cancel appt.                                     Plan/Recommendations: The patient will continue working with Care Coordination to achieve goal(s) as above. CHW will continue outreaches at minimum every 30 days and will involve Lead CC as needed or if patient is ready to move to Maintenance. Lead CC will continue to monitor CHW outreaches and patient's progress to goal(s) every 6 weeks.     Plan of Care updated and sent to patient: No    OMID Polanco   Social Work Care Coordinator   [facility]  [phone]

## 2024-11-07 RX ORDER — CYCLOBENZAPRINE HCL 10 MG
10 TABLET ORAL
Qty: 14 TABLET | Refills: 0 | OUTPATIENT
Start: 2024-11-07

## 2024-11-11 ENCOUNTER — PATIENT OUTREACH (OUTPATIENT)
Dept: CARE COORDINATION | Facility: CLINIC | Age: 39
End: 2024-11-11
Payer: COMMERCIAL

## 2024-11-11 DIAGNOSIS — Z71.89 OTHER SPECIFIED COUNSELING: Primary | Chronic | ICD-10-CM

## 2024-11-11 NOTE — PROGRESS NOTES
11/11/2024  Clinic Care Coordination Contact  Care Team Conversations  Email resources to patient  Here are some resources we discussed today.  1.Gratitude Sean / Jenaterri. Nov. 28th   [place] Table at a [facility] here is the link.  [email or web address]  [email or web address]              --------------------------------------------------------------------------------------------------------------  Join Us for the third annual  [place] AT THE [facility]  Tuesday, November 26 4 - 7 p.m.  [facility] [address], [place] [address]  Join [place] and partners at our [place] at the [facility] food  distribution and community gathering! Fill your holiday  table with turkeys and other proteins, pantry staples,  seasonal spice blends, locally sourced produce, and more.  Community resources, health and wellbeing services, and  vaccine clinic also available.  [place] here: [facility].community/harvest  Registration not required, but strongly encouraged.  Limited number of walk-in spots available.  --------------------------------------------------------------------------------------------------------------------------------------------------------------  Energy Assistance program  https://caprw.org  Energy Assistance, contact the Energy Assistance Program (EAP) by calling 633-037-7366 or emailing EAP at eap@caprw.org, to apply for Energy

## 2024-11-11 NOTE — Clinical Note
FYI Review note/action steps Still can't find section 8 voucher, still feeling unsafe at current place. She wondering if she can apply or go to [facility]. Can email patient about housing information.

## 2024-11-11 NOTE — PROGRESS NOTES
11/11/2024  Clinic Care Coordination Contact  Community Health Worker Follow Up    Care Gaps:     Health Maintenance Due   Topic Date Due    ASTHMA ACTION PLAN  10/22/2019    HEPATITIS B IMMUNIZATION (3 of 3 - 19+ 3-dose series) 06/08/2021    YEARLY PREVENTIVE VISIT  01/17/2023    ASTHMA CONTROL TEST  05/15/2024    INFLUENZA VACCINE (1) 09/01/2024    COVID-19 Vaccine (3 - 2024-25 season) 09/01/2024       Care Gaps Last addressed on 11-11-24, Care Gap Goal set: Yes, and Scheduled 11-11-24      Care Plan:   Care Plan: Housing Safety       Problem: SDOH LACK OF STABLE HOUSING       Goal: Establish Stable Housing       Start Date: 1/29/2024 Expected End Date: 9/30/2024    Recent Progress: 70%    Priority: Medium    Note:     Barriers: Social Support  Strengths: MH case management support, comfortable navigating resources  Patient expressed understanding of goal: Yes  Action steps to achieve this goal:  1, I will continue to access [employer] website to search for places that accept Section 8 Voucher.  2. I will call [facility] Intake Line [phone] M-F 9am to 3pm for legal resources/tenant rights  3. I will wait to get support from [facility]  at [facility] for support with address safety housing situation.  Updated 11-11-24 AL                            Care Plan: In Home Services and support       Problem: HP GENERAL PROBLEM       Goal: ON HOLD UNTIL I have stable housing. I would like support to connect with [facility] services or programs for in home services and support within 1-3 months.       Start Date: 6/10/2024 Expected End Date: 12/31/2024    Recent Progress: On Hold    Note:     Barriers: access to infor regarding services   Strengths: support from children  Patient expressed understanding of goal: yes  Action steps to achieve this goal:  1. ON HOLD UNTIL I have stable housing.  2. I will discuss with  from [employer] [facility] for support with getting in home services and  support.  3. I will wait for [facility] to call and schedule assessment for support regarding getting ILS worker and homemaker to support in the home.  Updated 11-11-24 AL                              Care Plan: Yearly Preventative Visit       Problem: HP GENERAL PROBLEM       Goal: I attend and complete Yearly Preventative visit and discuss health maintenance with the doctor on 11-12-24       Start Date: 9/24/2024 Expected End Date: 11/25/2024    This Visit's Progress: 90% Recent Progress: 90%    Note:     Barriers: access to when next annual wellness visit  Strengths: support from   Patient expressed understanding of goal: yes  Action steps to achieve this goal:  1. I  will attend Yearly Preventative Visit on 11-12-24 with PCP at [facility]  Updated 11-11-24 AL                                Care Plan: Mammogram       Problem: HP GENERAL PROBLEM       Goal: I will attend and complete my mammogram screening on 10-21-24       Start Date: 10/8/2024 Expected End Date: 11/29/2024    This Visit's Progress: 60%    Note:     Barriers: don't know mammogram is due  Strengths: support from family  Patient expressed understanding of goal: yes  Action steps to achieve this goal:  1. I will attend my mammogram on 10-21-24 at [facility]  2. I will call [phone] if I need to reschedule or cancel appt.                                Care Plan: Energy Assistance and SNAP benefit       Problem: HP GENERAL PROBLEM       Goal: I want to know if am eligible to apply for food program and resources for assistance with utility bills within 1-2 months.       Start Date: 11/11/2024 Expected End Date: 12/31/2024    This Visit's Progress: 70%    Note:     Barriers: limited financial, no income  Strengths: in process of getting support from [employer]  Patient expressed understanding of goal: yes  Action steps to achieve this goal:  1. I  will talk to [facility] for support to screen and apply for SNAP and Energy Assistance program  2. I  will talk to N for food resources.                              Clinic Care Coordination Contact  Community Health Worker Follow Up    Intervention and Education during outreach:   Called and spoke with patient  and follow up on goal(s).  Patient reported:  -has no income.   --struggling with foods, financial to pay utility bills and monthly expenses  -continue to search for jobs working on resume  -Still looking for a place that accept Section 8 Voucher  Would like to look at [facility] or [place] except [facility].  Challenging to find houses that are Section House  Still feel unsafe with current place.  Will continue to check on [employer] website  Working with [employer] still waiting to be assigned a .  Offered referral to FRW for SNAP and EA program and FRN for support with food resources.  Patient agreed with referrals.  Best time to call 9am-12 pm     Reminded of AWV tomorrow 11-12-24 appt.  Patient confirmed appt  Informed pt of accessing basic hygiene kit at the clinic tomorrow when she sees PCP.    Referral sent to FRW for SNAP benefit and Energy Assistance  Message sent to FRN for food resources   Basic hygiene products  Email food resources and [employer] and housing information.    Routed to  SW to review goals and action steps and discuss housing situation.    CHW Follow up: Monthly  CHW Plan: Follow up on goals  CHW Next Follow Up: 12-17-24    Usman Lorenzo  Community Health Worker  [facility]  Clinic Care Coordination  [email or web address]  [email or web address]   Office: [phone]  Fax: [phone]

## 2024-11-12 ENCOUNTER — TELEPHONE (OUTPATIENT)
Dept: FAMILY MEDICINE | Facility: CLINIC | Age: 39
End: 2024-11-12

## 2024-11-12 ENCOUNTER — OFFICE VISIT (OUTPATIENT)
Dept: FAMILY MEDICINE | Facility: CLINIC | Age: 39
End: 2024-11-12
Attending: STUDENT IN AN ORGANIZED HEALTH CARE EDUCATION/TRAINING PROGRAM
Payer: COMMERCIAL

## 2024-11-12 VITALS
DIASTOLIC BLOOD PRESSURE: 74 MMHG | BODY MASS INDEX: 43.99 KG/M2 | HEIGHT: 65 IN | HEART RATE: 84 BPM | SYSTOLIC BLOOD PRESSURE: 114 MMHG | OXYGEN SATURATION: 99 % | WEIGHT: 264 LBS | RESPIRATION RATE: 16 BRPM | TEMPERATURE: 97.4 F

## 2024-11-12 DIAGNOSIS — D25.9 UTERINE LEIOMYOMA, UNSPECIFIED LOCATION: ICD-10-CM

## 2024-11-12 DIAGNOSIS — N95.1 MENOPAUSAL SYNDROME (HOT FLASHES): ICD-10-CM

## 2024-11-12 DIAGNOSIS — F41.1 GAD (GENERALIZED ANXIETY DISORDER): Chronic | ICD-10-CM

## 2024-11-12 DIAGNOSIS — Z11.3 SCREEN FOR STD (SEXUALLY TRANSMITTED DISEASE): ICD-10-CM

## 2024-11-12 DIAGNOSIS — Z00.00 ADULT GENERAL MEDICAL EXAM: Primary | ICD-10-CM

## 2024-11-12 DIAGNOSIS — Z13.9 ENCOUNTER FOR SCREENING INVOLVING SOCIAL DETERMINANTS OF HEALTH (SDOH): ICD-10-CM

## 2024-11-12 DIAGNOSIS — J45.40 MODERATE PERSISTENT ASTHMA WITHOUT COMPLICATION: ICD-10-CM

## 2024-11-12 LAB
CLUE CELLS: ABNORMAL
ERYTHROCYTE [DISTWIDTH] IN BLOOD BY AUTOMATED COUNT: 12.3 % (ref 10–15)
EST. AVERAGE GLUCOSE BLD GHB EST-MCNC: 105 MG/DL
HBA1C MFR BLD: 5.3 % (ref 0–5.6)
HCT VFR BLD AUTO: 37.1 % (ref 35–47)
HGB BLD-MCNC: 12.4 G/DL (ref 11.7–15.7)
MCH RBC QN AUTO: 28.1 PG (ref 26.5–33)
MCHC RBC AUTO-ENTMCNC: 33.4 G/DL (ref 31.5–36.5)
MCV RBC AUTO: 84 FL (ref 78–100)
PLATELET # BLD AUTO: 245 10E3/UL (ref 150–450)
RBC # BLD AUTO: 4.41 10E6/UL (ref 3.8–5.2)
T PALLIDUM AB SER QL: NONREACTIVE
TRICHOMONAS, WET PREP: ABNORMAL
WBC # BLD AUTO: 5.9 10E3/UL (ref 4–11)
WBC'S/HIGH POWER FIELD, WET PREP: ABNORMAL
YEAST, WET PREP: ABNORMAL

## 2024-11-12 PROCEDURE — 99395 PREV VISIT EST AGE 18-39: CPT | Performed by: FAMILY MEDICINE

## 2024-11-12 PROCEDURE — 85027 COMPLETE CBC AUTOMATED: CPT | Performed by: FAMILY MEDICINE

## 2024-11-12 PROCEDURE — 84443 ASSAY THYROID STIM HORMONE: CPT | Performed by: FAMILY MEDICINE

## 2024-11-12 PROCEDURE — 87389 HIV-1 AG W/HIV-1&-2 AB AG IA: CPT | Performed by: FAMILY MEDICINE

## 2024-11-12 PROCEDURE — 87210 SMEAR WET MOUNT SALINE/INK: CPT | Performed by: FAMILY MEDICINE

## 2024-11-12 PROCEDURE — 83001 ASSAY OF GONADOTROPIN (FSH): CPT | Performed by: FAMILY MEDICINE

## 2024-11-12 PROCEDURE — 86780 TREPONEMA PALLIDUM: CPT | Performed by: FAMILY MEDICINE

## 2024-11-12 PROCEDURE — 83036 HEMOGLOBIN GLYCOSYLATED A1C: CPT | Performed by: FAMILY MEDICINE

## 2024-11-12 PROCEDURE — 36415 COLL VENOUS BLD VENIPUNCTURE: CPT | Performed by: FAMILY MEDICINE

## 2024-11-12 PROCEDURE — 80053 COMPREHEN METABOLIC PANEL: CPT | Performed by: FAMILY MEDICINE

## 2024-11-12 PROCEDURE — 80061 LIPID PANEL: CPT | Performed by: FAMILY MEDICINE

## 2024-11-12 SDOH — HEALTH STABILITY: PHYSICAL HEALTH: ON AVERAGE, HOW MANY MINUTES DO YOU ENGAGE IN EXERCISE AT THIS LEVEL?: 20 MIN

## 2024-11-12 SDOH — HEALTH STABILITY: PHYSICAL HEALTH: ON AVERAGE, HOW MANY DAYS PER WEEK DO YOU ENGAGE IN MODERATE TO STRENUOUS EXERCISE (LIKE A BRISK WALK)?: 2 DAYS

## 2024-11-12 ASSESSMENT — ASTHMA QUESTIONNAIRES
QUESTION_5 LAST FOUR WEEKS HOW WOULD YOU RATE YOUR ASTHMA CONTROL: WELL CONTROLLED
QUESTION_3 LAST FOUR WEEKS HOW OFTEN DID YOUR ASTHMA SYMPTOMS (WHEEZING, COUGHING, SHORTNESS OF BREATH, CHEST TIGHTNESS OR PAIN) WAKE YOU UP AT NIGHT OR EARLIER THAN USUAL IN THE MORNING: NOT AT ALL
QUESTION_4 LAST FOUR WEEKS HOW OFTEN HAVE YOU USED YOUR RESCUE INHALER OR NEBULIZER MEDICATION (SUCH AS ALBUTEROL): ONCE A WEEK OR LESS
ACT_TOTALSCORE: 20
ACT_TOTALSCORE: 20
QUESTION_2 LAST FOUR WEEKS HOW OFTEN HAVE YOU HAD SHORTNESS OF BREATH: ONCE A DAY
QUESTION_1 LAST FOUR WEEKS HOW MUCH OF THE TIME DID YOUR ASTHMA KEEP YOU FROM GETTING AS MUCH DONE AT WORK, SCHOOL OR AT HOME: NONE OF THE TIME

## 2024-11-12 ASSESSMENT — PATIENT HEALTH QUESTIONNAIRE - PHQ9
SUM OF ALL RESPONSES TO PHQ QUESTIONS 1-9: 10
10. IF YOU CHECKED OFF ANY PROBLEMS, HOW DIFFICULT HAVE THESE PROBLEMS MADE IT FOR YOU TO DO YOUR WORK, TAKE CARE OF THINGS AT HOME, OR GET ALONG WITH OTHER PEOPLE: EXTREMELY DIFFICULT
SUM OF ALL RESPONSES TO PHQ QUESTIONS 1-9: 10

## 2024-11-12 ASSESSMENT — SOCIAL DETERMINANTS OF HEALTH (SDOH): HOW OFTEN DO YOU GET TOGETHER WITH FRIENDS OR RELATIVES?: NEVER

## 2024-11-12 ASSESSMENT — ENCOUNTER SYMPTOMS: BACK PAIN: 1

## 2024-11-12 NOTE — PROGRESS NOTES
Food Resource Navigator Contact    FRN - Follow Up    Reason for call: Obesity Young children in the home with Food Insecurity    Intervention and Education during outreach: Pt needs urgent food help.    Food Resource Navigator Plan:     -Placed order for one-time food help through [employer] to be delivered 11/12/2024 10A-12M timeframe. Food is coming from [employer].    -Placed foodshelf delivery from [employer]. B will outreach to pt.     -Scheduled for [place] at [facility] for food distribution event 11/26  5:00-5:30PM.    -Pt encouraged to re-apply for SNAP benefits since she has been out of work since April. She also has 3 young children. Phone number to [facility] provided.     -Pt to also read email food resource from CHW at [facility].     Patient provided verbal consent for FRN to share contact information with IC: Yes    I have discussed the following goals with the patient: Pt encourage to stay on monthly schedule with [employer] for monthly food distrubution delivery. Provided [facility] phone number.    Spent 30 minutes in consult with the patient.     Fransisco Mccollum  Community Advancement Food Resource Navigator  [employer]   Cell: [phone]
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
## 2024-11-12 NOTE — TELEPHONE ENCOUNTER
Patient had appointment with Dr. Carrasco today. Asked RN for assistance with buccal swab mail-in test kit that her mental health clinician asked her to complete.     Writer explained how to swab cheeks, provided support as patient self-swabbed. Assisted patient in filling out associated form to bill insurance, packaged up sample for mail, and ensured all required items were included.    Moriah Reagan RN  [facility]

## 2024-11-13 LAB
ALBUMIN SERPL BCG-MCNC: 3.7 G/DL (ref 3.5–5.2)
ALP SERPL-CCNC: 74 U/L (ref 40–150)
ALT SERPL W P-5'-P-CCNC: 11 U/L (ref 0–50)
ANION GAP SERPL CALCULATED.3IONS-SCNC: 9 MMOL/L (ref 7–15)
AST SERPL W P-5'-P-CCNC: 16 U/L (ref 0–45)
BILIRUB SERPL-MCNC: 0.3 MG/DL
BUN SERPL-MCNC: 7.1 MG/DL (ref 6–20)
CALCIUM SERPL-MCNC: 9.3 MG/DL (ref 8.8–10.4)
CHLORIDE SERPL-SCNC: 106 MMOL/L (ref 98–107)
CHOLEST SERPL-MCNC: 195 MG/DL
CREAT SERPL-MCNC: 0.66 MG/DL (ref 0.51–0.95)
EGFRCR SERPLBLD CKD-EPI 2021: >90 ML/MIN/1.73M2
FASTING STATUS PATIENT QL REPORTED: ABNORMAL
FASTING STATUS PATIENT QL REPORTED: NORMAL
FSH SERPL IRP2-ACNC: 6.8 MIU/ML
GLUCOSE SERPL-MCNC: 74 MG/DL (ref 70–99)
HCO3 SERPL-SCNC: 26 MMOL/L (ref 22–29)
HDLC SERPL-MCNC: 48 MG/DL
HIV 1+2 AB+HIV1 P24 AG SERPL QL IA: NONREACTIVE
LDLC SERPL CALC-MCNC: 107 MG/DL
NONHDLC SERPL-MCNC: 147 MG/DL
POTASSIUM SERPL-SCNC: 4.2 MMOL/L (ref 3.4–5.3)
PROT SERPL-MCNC: 6.6 G/DL (ref 6.4–8.3)
SODIUM SERPL-SCNC: 141 MMOL/L (ref 135–145)
TRIGL SERPL-MCNC: 198 MG/DL
TSH SERPL DL<=0.005 MIU/L-ACNC: 1.2 UIU/ML (ref 0.3–4.2)

## 2024-11-14 ENCOUNTER — PATIENT OUTREACH (OUTPATIENT)
Dept: CARE COORDINATION | Facility: CLINIC | Age: 39
End: 2024-11-14
Payer: COMMERCIAL

## 2024-11-14 NOTE — PROGRESS NOTES
FRW UPDATE :  11/14/24 - Patient already applied for emergency assistance. FRW completed SNAP/CASH, Energy assistance application on November 14, 2024. FRW will mail application to patient.

## 2024-11-24 ASSESSMENT — ENCOUNTER SYMPTOMS
ENDOCRINE COMMENTS: HOT FLASHES
CHILLS: 0
FEVER: 0
SHORTNESS OF BREATH: 0
COLOR CHANGE: 0
HEMATURIA: 0
PALPITATIONS: 0
ABDOMINAL PAIN: 0
SEIZURES: 0
DYSURIA: 0
VOMITING: 0
ARTHRALGIAS: 0
COUGH: 0
SORE THROAT: 0
EYE PAIN: 0

## 2024-12-12 ENCOUNTER — PATIENT OUTREACH (OUTPATIENT)
Dept: CARE COORDINATION | Facility: CLINIC | Age: 39
End: 2024-12-12
Payer: COMMERCIAL

## 2024-12-12 NOTE — PROGRESS NOTES
FRW UPDATE :  12/12/24 - No VM. Established patient outreach attempted x 1 was unable to reach. Left message on voicemail with call back information and requested return call.  Plan: Current outreach date reflects FRW 's follow up within 30 days.

## 2024-12-17 ENCOUNTER — PATIENT OUTREACH (OUTPATIENT)
Dept: CARE COORDINATION | Facility: CLINIC | Age: 39
End: 2024-12-17
Payer: COMMERCIAL

## 2024-12-17 NOTE — PROGRESS NOTES
12/17/2024  Clinic Care Coordination Contact  [facility]/Jorge    Clinical Data: Care Coordinator Outreach    Outreach Documentation Number of Outreach Attempt   12/17/2024   3:46 PM 1       Unable to leave a message due to: No VM      Plan: Care Coordinator will try to reach patient again in 10 business days.      Plan: Care Coordinator will try to reach patient again in 10 business days.    CHW follow up: 2nd attempt 12-31-24    Usman Lorenzo  Community Health Worker  [facility] Care Coordination  [email or web address]   Office: [phone]  Fax: [phone]

## 2024-12-31 ENCOUNTER — PATIENT OUTREACH (OUTPATIENT)
Dept: CARE COORDINATION | Facility: CLINIC | Age: 39
End: 2024-12-31
Payer: COMMERCIAL

## 2024-12-31 NOTE — PROGRESS NOTES
[facility] x2 but no VM. [facility] letter sent via [employer].     W to outreach 1x more in 2-3 weeks.     OMID Lord   Social Work Primary Care Clinic Care Coordinator   [phone]  [email or web address]

## 2024-12-31 NOTE — LETTER
[facility] CARE COORDINATION      December 31, 2024    Armida Quiñonez  [address]  [place] [address]      Dear Armida,    We have been attempting to reach you since our last contact. Care coordination would like to continue to work with you and provide any additional support you may need on achieving your health care related goals. We would appreciate if you would give us a call at [phone] to let us know if you would like to continue working together. We know that there are many things that can affect our ability to communicate and we hope we can continue to work together.    We are focused on providing you with the highest-quality healthcare experience possible.    Sincerely,    Care coordination team

## 2024-12-31 NOTE — PROGRESS NOTES
12/31/2024  Clinic Care Coordination Contact  [facility]/Voicemail    Clinical Data: Care Coordinator Outreach    Outreach Documentation Number of Outreach Attempt   12/17/2024   3:46 PM 1   12/31/2024  12:24 PM 2       Unable to leave a message due to: No VM    No answer. No VM to leave message on patient's voicemail with call back information and requested return call.    Plan: Care Coordinator routed to [facility] to review chart before sending disenrollment letter with care coordinator contact information via mail.    Care Coordinator will wait for [facility] review chart    Usman Lorenzo  Community Health Worker  [facility] Care Coordination  [email or web address]   Office: [phone]  Fax: [phone]

## 2025-01-06 ENCOUNTER — PATIENT OUTREACH (OUTPATIENT)
Dept: CARE COORDINATION | Facility: CLINIC | Age: 40
End: 2025-01-06
Payer: COMMERCIAL

## 2025-01-06 NOTE — PROGRESS NOTES
1/6/2025  Clinic Care Coordination Contact  [facility]/Voicemail    Clinical Data: Care Coordinator Outreach    Outreach Documentation Number of Outreach Attempt   12/17/2024   3:46 PM 1   12/31/2024  12:24 PM 2   1/6/2025   2:24 PM 3       Unable to leave a message due to: No VM      Clinical Data: Care Coordinator Outreach  Outreach attempted x 3.  No answer. No VM.    Plan: Care Coordinator routed to [facility] to review chart before sending disenrollment letter with care coordinator contact information via [facility]     Care Coordinator will do no further outreaches at this time.    Patient has been disenrolled from Clinic Care coordination services, should they return my call or answer my letter, I will discuss clinic care coordination re-enrollment.    Usman Lorenzo  Community Health Worker  [facility] Care Coordination  [email or web address]  [email or web address]   Office: [phone]  Fax: [phone]

## 2025-01-06 NOTE — PROGRESS NOTES
Closed. Letter sent by W.     Tammy Garcia, \[facility] [place] [facility]\""   Social Work Primary Care Clinic Care Coordinator   [phone]  [email or web address]

## 2025-01-06 NOTE — LETTER
[facility] CARE COORDINATION  [address] [place] [address]    January 6, 2025    Armida Quiñonez  [address]  [place] [address]      Dear Armida,    I have been unsuccessful in reaching you since our last contact. At this time the Care Coordination team will make no further attempts to reach you, however this does not change your ability to continue receiving care from your providers at your primary care clinic.     If you need additional support from a care coordinator in the future please contact me  at [phone].    We are focused on providing you with the highest-quality healthcare experience possible.    Sincerely,      Usman Lorenzo  Community Health Worker  [facility] Care Coordination  [email or web address]  [email or web address]   Office: [phone]  Fax: [phone]

## 2025-01-09 ENCOUNTER — PATIENT OUTREACH (OUTPATIENT)
Dept: CARE COORDINATION | Facility: CLINIC | Age: 40
End: 2025-01-09
Payer: COMMERCIAL

## 2025-01-09 NOTE — PROGRESS NOTES
FR UPDATE :  1/9/25 - No VM. Established outreach attempted x 2. Left message on voicemail indicating last outreach attempt.   Plan: FRW closed the FRW program, FAM Case, FAM Tracker and will send an unreachable letter. Patient can be referred back to [facility] if needed.

## 2025-01-09 NOTE — LETTER
[facility] CARE COORDINATION        January 9, 2025      Armida Quiñonez  [address]  [place] [address]        Dear Armida,                                                                      The Financial Resource team has attempted to reach to you to assist you with any financial barriers you may be facing in your healthcare journey.  We would like to provide any additional support you may need related to financial support for your healthcare.  Our team are Certified MNSure Navigators, and we offer support with application assistance for Medical Assistance, MNSure Applications, Energy Assistance, Emergency Assistance, Food Assistance and many other initial applications for [facility] benefits.     I would appreciate if you would call me at [phone] to let me know if you would like assistance.      Sincerely,                                                                         Shellie Brizuela  Financial Resource Worker  [facility] Care Coordination  [phone]

## 2025-07-22 ENCOUNTER — OFFICE VISIT (OUTPATIENT)
Dept: INTERNAL MEDICINE | Facility: CLINIC | Age: 40
End: 2025-07-22
Payer: COMMERCIAL

## 2025-07-22 VITALS
TEMPERATURE: 98.8 F | OXYGEN SATURATION: 100 % | RESPIRATION RATE: 16 BRPM | WEIGHT: 242.6 LBS | HEART RATE: 87 BPM | DIASTOLIC BLOOD PRESSURE: 63 MMHG | SYSTOLIC BLOOD PRESSURE: 123 MMHG | BODY MASS INDEX: 40.91 KG/M2

## 2025-07-22 DIAGNOSIS — J45.40 MODERATE PERSISTENT ASTHMA WITHOUT COMPLICATION: ICD-10-CM

## 2025-07-22 DIAGNOSIS — F41.1 GAD (GENERALIZED ANXIETY DISORDER): Chronic | ICD-10-CM

## 2025-07-22 DIAGNOSIS — Z11.3 SCREENING EXAMINATION FOR STI: Primary | ICD-10-CM

## 2025-07-22 DIAGNOSIS — K21.00 GASTROESOPHAGEAL REFLUX DISEASE WITH ESOPHAGITIS WITHOUT HEMORRHAGE: ICD-10-CM

## 2025-07-22 DIAGNOSIS — R10.9 FLANK PAIN: ICD-10-CM

## 2025-07-22 DIAGNOSIS — R29.898 RIGHT LEG WEAKNESS: ICD-10-CM

## 2025-07-22 DIAGNOSIS — J30.2 SEASONAL ALLERGIC RHINITIS, UNSPECIFIED TRIGGER: ICD-10-CM

## 2025-07-22 DIAGNOSIS — F31.81 BIPOLAR 2 DISORDER (H): Chronic | ICD-10-CM

## 2025-07-22 PROBLEM — F32.9 MAJOR DEPRESSIVE DISORDER, SINGLE EPISODE, UNSPECIFIED: Status: ACTIVE | Noted: 2020-08-26

## 2025-07-22 LAB
ALBUMIN UR-MCNC: NEGATIVE MG/DL
APPEARANCE UR: CLEAR
BACTERIA #/AREA URNS HPF: ABNORMAL /HPF
BILIRUB UR QL STRIP: NEGATIVE
COLOR UR AUTO: YELLOW
GLUCOSE UR STRIP-MCNC: NEGATIVE MG/DL
HGB UR QL STRIP: NEGATIVE
KETONES UR STRIP-MCNC: NEGATIVE MG/DL
LEUKOCYTE ESTERASE UR QL STRIP: NEGATIVE
MUCOUS THREADS #/AREA URNS LPF: PRESENT /LPF
NITRATE UR QL: NEGATIVE
PH UR STRIP: 6 [PH] (ref 5–7)
RBC #/AREA URNS AUTO: ABNORMAL /HPF
SP GR UR STRIP: 1.02 (ref 1–1.03)
SQUAMOUS #/AREA URNS AUTO: ABNORMAL /LPF
UROBILINOGEN UR STRIP-ACNC: 0.2 E.U./DL
WBC #/AREA URNS AUTO: ABNORMAL /HPF

## 2025-07-22 PROCEDURE — 81001 URINALYSIS AUTO W/SCOPE: CPT

## 2025-07-22 PROCEDURE — 3078F DIAST BP <80 MM HG: CPT

## 2025-07-22 PROCEDURE — 1125F AMNT PAIN NOTED PAIN PRSNT: CPT

## 2025-07-22 PROCEDURE — 87591 N.GONORRHOEAE DNA AMP PROB: CPT

## 2025-07-22 PROCEDURE — 3074F SYST BP LT 130 MM HG: CPT

## 2025-07-22 PROCEDURE — 87491 CHLMYD TRACH DNA AMP PROBE: CPT

## 2025-07-22 PROCEDURE — 99214 OFFICE O/P EST MOD 30 MIN: CPT

## 2025-07-22 RX ORDER — FLUTICASONE PROPIONATE 110 UG/1
1 AEROSOL, METERED RESPIRATORY (INHALATION) 2 TIMES DAILY
Qty: 12 G | Refills: 3 | Status: SHIPPED | OUTPATIENT
Start: 2025-07-22

## 2025-07-22 RX ORDER — EPINEPHRINE 0.3 MG/.3ML
0.3 INJECTION SUBCUTANEOUS PRN
Qty: 2 EACH | Refills: 0 | Status: SHIPPED | OUTPATIENT
Start: 2025-07-22

## 2025-07-22 RX ORDER — ALBUTEROL SULFATE 90 UG/1
2 INHALANT RESPIRATORY (INHALATION) EVERY 6 HOURS PRN
Qty: 18 G | Refills: 3 | Status: SHIPPED | OUTPATIENT
Start: 2025-07-22

## 2025-07-22 RX ORDER — HYDROXYZINE PAMOATE 25 MG/1
CAPSULE ORAL
COMMUNITY
Start: 2024-11-05

## 2025-07-22 RX ORDER — OMEPRAZOLE 20 MG/1
20 CAPSULE, DELAYED RELEASE ORAL DAILY
Qty: 112 CAPSULE | Refills: 0 | Status: SHIPPED | OUTPATIENT
Start: 2025-07-22

## 2025-07-22 ASSESSMENT — ASTHMA QUESTIONNAIRES
QUESTION_2 LAST FOUR WEEKS HOW OFTEN HAVE YOU HAD SHORTNESS OF BREATH: ONCE OR TWICE A WEEK
QUESTION_1 LAST FOUR WEEKS HOW MUCH OF THE TIME DID YOUR ASTHMA KEEP YOU FROM GETTING AS MUCH DONE AT WORK, SCHOOL OR AT HOME: A LITTLE OF THE TIME
QUESTION_4 LAST FOUR WEEKS HOW OFTEN HAVE YOU USED YOUR RESCUE INHALER OR NEBULIZER MEDICATION (SUCH AS ALBUTEROL): TWO OR THREE TIMES PER WEEK
QUESTION_3 LAST FOUR WEEKS HOW OFTEN DID YOUR ASTHMA SYMPTOMS (WHEEZING, COUGHING, SHORTNESS OF BREATH, CHEST TIGHTNESS OR PAIN) WAKE YOU UP AT NIGHT OR EARLIER THAN USUAL IN THE MORNING: ONCE OR TWICE
QUESTION_5 LAST FOUR WEEKS HOW WOULD YOU RATE YOUR ASTHMA CONTROL: SOMEWHAT CONTROLLED
ACT_TOTALSCORE: 18

## 2025-07-22 ASSESSMENT — PATIENT HEALTH QUESTIONNAIRE - PHQ9
10. IF YOU CHECKED OFF ANY PROBLEMS, HOW DIFFICULT HAVE THESE PROBLEMS MADE IT FOR YOU TO DO YOUR WORK, TAKE CARE OF THINGS AT HOME, OR GET ALONG WITH OTHER PEOPLE: VERY DIFFICULT
SUM OF ALL RESPONSES TO PHQ QUESTIONS 1-9: 9
SUM OF ALL RESPONSES TO PHQ QUESTIONS 1-9: 9

## 2025-07-22 ASSESSMENT — PAIN SCALES - GENERAL: PAINLEVEL_OUTOF10: SEVERE PAIN (10)

## 2025-07-22 NOTE — PROGRESS NOTES
Assessment & Plan     Moderate persistent asthma without complication  Pt requested refill  - fluticasone (FLOVENT HFA) 110 MCG/ACT inhaler; Inhale 1 puff into the lungs 2 times daily.  - albuterol (PROAIR HFA/PROVENTIL HFA/VENTOLIN HFA) 108 (90 Base) MCG/ACT inhaler; Inhale 2 puffs into the lungs every 6 hours as needed for shortness of breath.    Screening examination for STI  Pt requested testing, is monogamous and has sx of vaginal discharge  - NEISSERIA GONORRHOEA PCR; Future  - CHLAMYDIA TRACHOMATIS PCR; Future  - NEISSERIA GONORRHOEA PCR  - CHLAMYDIA TRACHOMATIS PCR    Right leg weakness  For the last 3-4 weeks pt has been having problems with her right knee. Pt has had PT to knee and was told she was having weakness to her right ankle that is causing her to be at risk for falling. Pt had hx of repetitive trauma to right ankle. Pt had partaken in PT and stopped due to her schedule but wants to recommence. Suggested ankle brace for support and to return to PT for strengthening.  - Ankle/Foot Bracing Supplies Order Ankle Brace; Right  - Physical Therapy  Referral; Future  - Physical Therapy  Referral    Seasonal allergic rhinitis, unspecified trigger  Pt requested refill  - EPINEPHrine (ANY BX GENERIC EQUIV) 0.3 MG/0.3ML injection 2-pack; Inject 0.3 mLs (0.3 mg) into the muscle as needed for anaphylaxis.    Gastroesophageal reflux disease with esophagitis without hemorrhage  Pt reports reflux symptoms that are triggered by eating anything that has tomato sauce and spicy foods.  Pt has sx from ruthie. Pt wakes up with the reflux in the night and coughing with the acid migrating into her throat and mouth.  Patient experiences the symptoms 2 hours after she eats.    - omeprazole (PRILOSEC) 20 MG DR capsule; Take 1 capsule (20 mg) by mouth daily.    Flank pain  Low back pain has been going on for a couple weeks. Pt states that she has a hx of having back pain with BV. Pt reports some dysuria. Pt  "denies any hematuria. Pt denies any urinary frequency. Patient reports right flank pain upon palpation and with left lateral bends and left rotation.  - UA with Microscopic reflex to Culture - Clinic Collect  - UA Microscopic with Reflex to Culture    Bipolar 2 disorder (H)  DEMETRI (generalized anxiety disorder)  Pt is not taking any psychotropic medications. Pt states that she is doing well with her mental health. She has stopped using cocaine for the last 5 weeks. She has stopped drinking alcohol because she is afraid of hurting her liver. She has a vape that she hits every once in a while and smokes weed once in a while. She is going to the gym and is using that to manage her mental health and feels sad when she misses going to the gym. Pt may resign to her room but does not feel like she is having depressing moments. Pt likes to isolate in a healthy way as long as it does not pull her into a depression    Nicotine/Tobacco Cessation  She reports that she has been smoking cigarettes. She has been exposed to tobacco smoke. She has never used smokeless tobacco.  Nicotine/Tobacco Cessation Plan  Information offered: Patient not interested at this time      BMI  Estimated body mass index is 40.91 kg/m  as calculated from the following:    Height as of 11/12/24: 1.64 m (5' 4.57\").    Weight as of this encounter: 110 kg (242 lb 9.6 oz).           Mandi Huffman is a 40 year old, presenting for the following health issues:  Pain (Knee and lower back pain. Was previously told she needed knee surgery but never got it done.)        7/22/2025     3:11 PM   Additional Questions   Roomed by David Gomez CMA   Accompanied by Self         7/22/2025     3:11 PM   Patient Reported Additional Medications   Patient reports taking the following new medications no     Musculoskeletal Problem    History of Present Illness       Reason for visit:  Some concerns on [place]  Symptom onset:  1-2 weeks ago  Symptoms include:  Back knee pain " skin rash  Symptom intensity:  Severe  Symptom progression:  Worsening  Had these symptoms before:  Yes  Has tried/received treatment for these symptoms:  Yes  Previous treatment was successful:  No  What makes it worse:  Not sure  What makes it better:  Not sure She is missing 7 dose(s) of medications per week.  She is not taking prescribed medications regularly due to side effects and remembering to take.    For the last 3-4 weeks pt has been having problems with her right knee. Pt has had PT to knee and was told she was having weakness to her right ankle that is causing her to be at risk for falling. Pt had hx of repetitive trauma to right ankle. Pt had partaken in PT and stopped due to her schedule but wants to recommence. Suggested ankle brace for support and to return to PT for strengthening.    Low back pain has been going on for a couple weeks. Pt states that she has a hx of having back pain with BV. Pt reports some dysuria. Pt denies any hematuria. Pt denies any urinary frequency. Patient reports right flank pain upon palpation and with left lateral bends and left rotation.      Pt is not taking any psychotropic medications. Pt states that she is doing well with her mental health. She has stopped using cocaine for the last 5 weeks. She has stopped drinking alcohol because she is afraid of hurting her liver. She has a vape that she hits every once in a while and smokes weed once in a while. She is going to the gym and is using that to manage her mental health and feels sad when she misses going to the gym. Pt may resign to her room but does not feel like she is having depressing moments. Pt likes to isolate in a healthy way as long as it does not pull her into a depression    Pt reports reflux symptoms that are triggered by eating anything that has tomato sauce and spicy foods.  Pt has sx from ruthie. Pt wakes up with the reflux in the night and coughing with the acid migrating into her throat and mouth.   Patient experiences the symptoms 2 hours after she eats.              Review of Systems  Constitutional, neuro, ENT, endocrine, pulmonary, cardiac, gastrointestinal, genitourinary, musculoskeletal, integument and psychiatric systems are negative, except as otherwise noted.      Objective    /63 (BP Location: Left arm, Patient Position: Sitting, Cuff Size: Adult Large)   Pulse 87   Temp 98.8  F (37.1  C) (Oral)   Resp 16   Wt 110 kg (242 lb 9.6 oz)   SpO2 100%   BMI 40.91 kg/m    Body mass index is 40.91 kg/m .  Physical Exam   GENERAL: alert and no distress  EYES: Eyes grossly normal to inspection, PERRL and conjunctivae and sclerae normal  HENT: normal cephalic/atraumatic, right ear: normal: no effusions, no erythema, normal landmarks, left ear: normal: no effusions, no erythema, normal landmarks, nose and mouth without ulcers or lesions, oropharynx clear, and oral mucous membranes moist, Grade II/IV tonsillar hypertrophy, no erythema  NECK: no adenopathy, no asymmetry, masses, or scars  RESP: lungs clear to auscultation - no rales, rhonchi or wheezes  CV: regular rate and rhythm, normal S1 S2, no S3 or S4, no murmur, click or rub, no peripheral edema  ABDOMEN: soft, nontender, no hepatosplenomegaly, no masses and bowel sounds normal  MS: normal muscle tone, normal range of motion, and no edema  ORTHO: Lumber/Thoracic Spine Exam: Tender:  right para lumbar muscles  Range of Motion:  lumbar flexion  full, pain-free, lumbar extension  decreased, painful, left lateral lumbar bending  decreased, painful, right lateral lumbar bending  decreased, pain-free, left lateral lumbar rotation  decreased, painful, right lateral lumbar rotation  decreased, pain-free  Strength:  able to heel walk, able to toe walk    SKIN: no suspicious lesions or rashes  NEURO: Normal strength and tone, mentation intact and speech normal  PSYCH: mentation appears normal, affect normal/bright            Signed Electronically by:  RENO Nick CNP

## 2025-07-23 ENCOUNTER — TELEPHONE (OUTPATIENT)
Dept: INTERNAL MEDICINE | Facility: CLINIC | Age: 40
End: 2025-07-23
Payer: COMMERCIAL

## 2025-07-23 LAB
C TRACH DNA SPEC QL NAA+PROBE: NEGATIVE
N GONORRHOEA DNA SPEC QL NAA+PROBE: NEGATIVE
SPECIMEN TYPE: NORMAL
SPECIMEN TYPE: NORMAL

## 2025-07-23 NOTE — TELEPHONE ENCOUNTER
Patient calls, follow up questions about results and medication.     She has scheduled PT    Reviewed all medication were sent to [facility] pharmacy and gave phone #   Telephone Fax   [phone]      Patient asked about results- Explained Result and that the provider will write a results note with a summary and any recommendations. .     Patient asked what her [employer] Fetch username was- explained.     Patient denied any further questions.